# Patient Record
Sex: MALE | Race: WHITE | Employment: FULL TIME | URBAN - NONMETROPOLITAN AREA
[De-identification: names, ages, dates, MRNs, and addresses within clinical notes are randomized per-mention and may not be internally consistent; named-entity substitution may affect disease eponyms.]

---

## 2021-04-17 ENCOUNTER — HOSPITAL ENCOUNTER (INPATIENT)
Age: 51
LOS: 2 days | Discharge: HOME | DRG: 309 | End: 2021-04-19
Attending: EMERGENCY MEDICINE | Admitting: INTERNAL MEDICINE
Payer: COMMERCIAL

## 2021-04-17 ENCOUNTER — APPOINTMENT (OUTPATIENT)
Dept: GENERAL RADIOLOGY | Age: 51
DRG: 309 | End: 2021-04-17
Payer: COMMERCIAL

## 2021-04-17 DIAGNOSIS — I48.92 ATRIAL FLUTTER WITH RAPID VENTRICULAR RESPONSE (HCC): Primary | ICD-10-CM

## 2021-04-17 PROBLEM — R00.2 PALPITATIONS: Status: ACTIVE | Noted: 2021-04-17

## 2021-04-17 PROBLEM — G47.33 OSA (OBSTRUCTIVE SLEEP APNEA): Status: ACTIVE | Noted: 2021-04-17

## 2021-04-17 PROBLEM — I47.1 PSVT (PAROXYSMAL SUPRAVENTRICULAR TACHYCARDIA) (HCC): Status: ACTIVE | Noted: 2021-04-17

## 2021-04-17 PROBLEM — I10 HTN (HYPERTENSION), BENIGN: Status: ACTIVE | Noted: 2021-04-17

## 2021-04-17 PROBLEM — E11.9 TYPE 2 DIABETES MELLITUS WITHOUT COMPLICATION, WITHOUT LONG-TERM CURRENT USE OF INSULIN (HCC): Status: ACTIVE | Noted: 2021-04-17

## 2021-04-17 PROBLEM — I47.10 PSVT (PAROXYSMAL SUPRAVENTRICULAR TACHYCARDIA): Status: ACTIVE | Noted: 2021-04-17

## 2021-04-17 PROBLEM — Z87.891 HISTORY OF TOBACCO USE DISORDER: Status: ACTIVE | Noted: 2021-04-17

## 2021-04-17 LAB
ALBUMIN SERPL-MCNC: 3.4 GM/DL (ref 3.4–5)
ALP BLD-CCNC: 90 U/L (ref 46–116)
ALT SERPL-CCNC: 21 U/L (ref 14–63)
ANION GAP: 5 MEQ/L (ref 8–16)
AST SERPL-CCNC: 15 U/L (ref 15–37)
AVERAGE GLUCOSE: 111 MG/DL (ref 70–126)
BILIRUB SERPL-MCNC: 0.6 MG/DL (ref 0.2–1)
BUN BLDV-MCNC: 24 MG/DL (ref 7–18)
CHLORIDE BLD-SCNC: 103 MEQ/L (ref 98–107)
CHP ED QC CHECK: YES
CO2: 31 MEQ/L (ref 21–32)
CREAT SERPL-MCNC: 1.3 MG/DL (ref 0.6–1.3)
DIFFERENTIAL, MANUAL: NORMAL
EOSINOPHILS RELATIVE PERCENT: 2 % (ref 0–4)
GFR, ESTIMATED: 62 ML/MIN/1.73M2
GLUCOSE BLD-MCNC: 103 MG/DL (ref 74–106)
GLUCOSE BLD-MCNC: 90 MG/DL
GLUCOSE BLD-MCNC: 90 MG/DL (ref 70–108)
HBA1C MFR BLD: 5.7 % (ref 4.4–6.4)
HCT VFR BLD CALC: 52.1 % (ref 42–52)
HEMOGLOBIN: 17 GM/DL (ref 14–18)
INR BLD: 2.12 (ref 0.85–1.13)
LYMPHOCYTES # BLD: 22 % (ref 15–47)
MAGNESIUM: 2 MG/DL (ref 1.6–2.4)
MAGNESIUM: 2 MG/DL (ref 1.8–2.4)
MCH RBC QN AUTO: 31.6 PG (ref 27–31)
MCHC RBC AUTO-ENTMCNC: 32.7 GM/DL (ref 33–37)
MCV RBC AUTO: 96.6 FL (ref 80–94)
MONOCYTES: 7 % (ref 0–12)
NT PRO BNP: 1552 PG/ML (ref 0–450)
PDW BLD-RTO: 15.3 % (ref 11.5–14.5)
PLATELET # BLD: 222 THOU/MM3 (ref 130–400)
PLATELET ESTIMATE: ADEQUATE
PMV BLD AUTO: 7.3 FL (ref 7.4–10.4)
POC CALCIUM: 9 MG/DL (ref 8.5–10.1)
POTASSIUM SERPL-SCNC: 4.9 MEQ/L (ref 3.5–5.1)
RBC # BLD: 5.39 MILL/MM3 (ref 4.7–6.1)
SEGS: 69 % (ref 43–75)
SODIUM BLD-SCNC: 139 MEQ/L (ref 136–145)
TOTAL PROTEIN: 7.8 GM/DL (ref 6.4–8.2)
TROPONIN I: < 0.017 NG/ML (ref 0.02–0.06)
TROPONIN T: < 0.01 NG/ML
TSH SERPL DL<=0.05 MIU/L-ACNC: 1.79 UIU/ML (ref 0.4–4.2)
WBC # BLD: 9.7 THOU/MM3 (ref 4.8–10.8)

## 2021-04-17 PROCEDURE — 84484 ASSAY OF TROPONIN QUANT: CPT

## 2021-04-17 PROCEDURE — 87641 MR-STAPH DNA AMP PROBE: CPT

## 2021-04-17 PROCEDURE — 99223 1ST HOSP IP/OBS HIGH 75: CPT | Performed by: INTERNAL MEDICINE

## 2021-04-17 PROCEDURE — 6360000002 HC RX W HCPCS

## 2021-04-17 PROCEDURE — 71045 X-RAY EXAM CHEST 1 VIEW: CPT

## 2021-04-17 PROCEDURE — 2580000003 HC RX 258: Performed by: EMERGENCY MEDICINE

## 2021-04-17 PROCEDURE — 93005 ELECTROCARDIOGRAM TRACING: CPT | Performed by: EMERGENCY MEDICINE

## 2021-04-17 PROCEDURE — 84443 ASSAY THYROID STIM HORMONE: CPT

## 2021-04-17 PROCEDURE — 2580000003 HC RX 258: Performed by: INTERNAL MEDICINE

## 2021-04-17 PROCEDURE — 83880 ASSAY OF NATRIURETIC PEPTIDE: CPT

## 2021-04-17 PROCEDURE — 99282 EMERGENCY DEPT VISIT SF MDM: CPT

## 2021-04-17 PROCEDURE — 87081 CULTURE SCREEN ONLY: CPT

## 2021-04-17 PROCEDURE — 85610 PROTHROMBIN TIME: CPT

## 2021-04-17 PROCEDURE — 6360000002 HC RX W HCPCS: Performed by: EMERGENCY MEDICINE

## 2021-04-17 PROCEDURE — 96365 THER/PROPH/DIAG IV INF INIT: CPT

## 2021-04-17 PROCEDURE — 83735 ASSAY OF MAGNESIUM: CPT

## 2021-04-17 PROCEDURE — 93005 ELECTROCARDIOGRAM TRACING: CPT | Performed by: NURSE PRACTITIONER

## 2021-04-17 PROCEDURE — 36415 COLL VENOUS BLD VENIPUNCTURE: CPT

## 2021-04-17 PROCEDURE — 6360000002 HC RX W HCPCS: Performed by: INTERNAL MEDICINE

## 2021-04-17 PROCEDURE — 93005 ELECTROCARDIOGRAM TRACING: CPT | Performed by: INTERNAL MEDICINE

## 2021-04-17 PROCEDURE — 87500 VANOMYCIN DNA AMP PROBE: CPT

## 2021-04-17 PROCEDURE — 82948 REAGENT STRIP/BLOOD GLUCOSE: CPT

## 2021-04-17 PROCEDURE — 80053 COMPREHEN METABOLIC PANEL: CPT

## 2021-04-17 PROCEDURE — 2500000003 HC RX 250 WO HCPCS: Performed by: INTERNAL MEDICINE

## 2021-04-17 PROCEDURE — 2500000003 HC RX 250 WO HCPCS: Performed by: EMERGENCY MEDICINE

## 2021-04-17 PROCEDURE — 2060000000 HC ICU INTERMEDIATE R&B

## 2021-04-17 PROCEDURE — 85025 COMPLETE CBC W/AUTO DIFF WBC: CPT

## 2021-04-17 PROCEDURE — 83036 HEMOGLOBIN GLYCOSYLATED A1C: CPT

## 2021-04-17 RX ORDER — PROMETHAZINE HYDROCHLORIDE 25 MG/1
12.5 TABLET ORAL EVERY 6 HOURS PRN
Status: DISCONTINUED | OUTPATIENT
Start: 2021-04-17 | End: 2021-04-19 | Stop reason: HOSPADM

## 2021-04-17 RX ORDER — LOSARTAN POTASSIUM 25 MG/1
25 TABLET ORAL DAILY
COMMUNITY
End: 2022-08-30 | Stop reason: ALTCHOICE

## 2021-04-17 RX ORDER — 0.9 % SODIUM CHLORIDE 0.9 %
1000 INTRAVENOUS SOLUTION INTRAVENOUS ONCE
Status: COMPLETED | OUTPATIENT
Start: 2021-04-17 | End: 2021-04-17

## 2021-04-17 RX ORDER — ACETAMINOPHEN 650 MG/1
650 SUPPOSITORY RECTAL EVERY 6 HOURS PRN
Status: DISCONTINUED | OUTPATIENT
Start: 2021-04-17 | End: 2021-04-19 | Stop reason: HOSPADM

## 2021-04-17 RX ORDER — ACETAMINOPHEN 325 MG/1
650 TABLET ORAL EVERY 6 HOURS PRN
Status: DISCONTINUED | OUTPATIENT
Start: 2021-04-17 | End: 2021-04-19 | Stop reason: HOSPADM

## 2021-04-17 RX ORDER — DIGOXIN 0.25 MG/ML
500 INJECTION INTRAMUSCULAR; INTRAVENOUS ONCE
Status: COMPLETED | OUTPATIENT
Start: 2021-04-17 | End: 2021-04-17

## 2021-04-17 RX ORDER — ADENOSINE 3 MG/ML
INJECTION, SOLUTION INTRAVENOUS
Status: COMPLETED
Start: 2021-04-17 | End: 2021-04-17

## 2021-04-17 RX ORDER — ADENOSINE 3 MG/ML
6 INJECTION, SOLUTION INTRAVENOUS ONCE
Status: DISCONTINUED | OUTPATIENT
Start: 2021-04-17 | End: 2021-04-17

## 2021-04-17 RX ORDER — ADENOSINE 3 MG/ML
12 INJECTION, SOLUTION INTRAVENOUS ONCE
Status: DISCONTINUED | OUTPATIENT
Start: 2021-04-17 | End: 2021-04-17

## 2021-04-17 RX ORDER — SODIUM CHLORIDE 9 MG/ML
25 INJECTION, SOLUTION INTRAVENOUS PRN
Status: DISCONTINUED | OUTPATIENT
Start: 2021-04-17 | End: 2021-04-19 | Stop reason: HOSPADM

## 2021-04-17 RX ORDER — WARFARIN SODIUM 5 MG/1
5 TABLET ORAL
COMMUNITY
End: 2022-08-15 | Stop reason: SDUPTHER

## 2021-04-17 RX ORDER — SODIUM CHLORIDE 0.9 % (FLUSH) 0.9 %
10 SYRINGE (ML) INJECTION EVERY 12 HOURS SCHEDULED
Status: DISCONTINUED | OUTPATIENT
Start: 2021-04-17 | End: 2021-04-19 | Stop reason: HOSPADM

## 2021-04-17 RX ORDER — LOSARTAN POTASSIUM 25 MG/1
25 TABLET ORAL DAILY
Status: DISCONTINUED | OUTPATIENT
Start: 2021-04-18 | End: 2021-04-17

## 2021-04-17 RX ORDER — DILTIAZEM HYDROCHLORIDE 5 MG/ML
20 INJECTION INTRAVENOUS ONCE
Status: COMPLETED | OUTPATIENT
Start: 2021-04-17 | End: 2021-04-17

## 2021-04-17 RX ORDER — SODIUM CHLORIDE 0.9 % (FLUSH) 0.9 %
10 SYRINGE (ML) INJECTION PRN
Status: DISCONTINUED | OUTPATIENT
Start: 2021-04-17 | End: 2021-04-19 | Stop reason: HOSPADM

## 2021-04-17 RX ORDER — METOPROLOL TARTRATE 5 MG/5ML
5 INJECTION INTRAVENOUS
Status: DISCONTINUED | OUTPATIENT
Start: 2021-04-17 | End: 2021-04-19 | Stop reason: HOSPADM

## 2021-04-17 RX ORDER — LOSARTAN POTASSIUM 25 MG/1
25 TABLET ORAL DAILY
Status: DISCONTINUED | OUTPATIENT
Start: 2021-04-18 | End: 2021-04-19 | Stop reason: HOSPADM

## 2021-04-17 RX ORDER — ADENOSINE 3 MG/ML
12 INJECTION, SOLUTION INTRAVENOUS ONCE
Status: COMPLETED | OUTPATIENT
Start: 2021-04-17 | End: 2021-04-17

## 2021-04-17 RX ORDER — 0.9 % SODIUM CHLORIDE 0.9 %
500 INTRAVENOUS SOLUTION INTRAVENOUS ONCE
Status: COMPLETED | OUTPATIENT
Start: 2021-04-17 | End: 2021-04-18

## 2021-04-17 RX ORDER — POLYETHYLENE GLYCOL 3350 17 G/17G
17 POWDER, FOR SOLUTION ORAL DAILY PRN
Status: DISCONTINUED | OUTPATIENT
Start: 2021-04-17 | End: 2021-04-19 | Stop reason: HOSPADM

## 2021-04-17 RX ORDER — WARFARIN SODIUM 3 MG/1
2.5 TABLET ORAL DAILY
Status: ON HOLD | COMMUNITY
End: 2021-04-17 | Stop reason: CLARIF

## 2021-04-17 RX ORDER — ADENOSINE 3 MG/ML
6 INJECTION, SOLUTION INTRAVENOUS ONCE
Status: COMPLETED | OUTPATIENT
Start: 2021-04-17 | End: 2021-04-17

## 2021-04-17 RX ORDER — ONDANSETRON 2 MG/ML
4 INJECTION INTRAMUSCULAR; INTRAVENOUS EVERY 6 HOURS PRN
Status: DISCONTINUED | OUTPATIENT
Start: 2021-04-17 | End: 2021-04-19 | Stop reason: HOSPADM

## 2021-04-17 RX ADMIN — ADENOSINE 12 MG: 3 INJECTION INTRAVENOUS at 23:00

## 2021-04-17 RX ADMIN — METOPROLOL TARTRATE 5 MG: 5 INJECTION INTRAVENOUS at 23:13

## 2021-04-17 RX ADMIN — ADENOSINE 12 MG: 3 INJECTION, SOLUTION INTRAVENOUS at 23:00

## 2021-04-17 RX ADMIN — DILTIAZEM HYDROCHLORIDE 20 MG: 5 INJECTION INTRAVENOUS at 18:16

## 2021-04-17 RX ADMIN — SODIUM CHLORIDE, PRESERVATIVE FREE 10 ML: 5 INJECTION INTRAVENOUS at 23:06

## 2021-04-17 RX ADMIN — DIGOXIN 500 MCG: 0.25 INJECTION INTRAMUSCULAR; INTRAVENOUS at 18:53

## 2021-04-17 RX ADMIN — SODIUM CHLORIDE 1000 ML: 9 INJECTION, SOLUTION INTRAVENOUS at 18:29

## 2021-04-17 RX ADMIN — ADENOSINE 6 MG: 3 INJECTION INTRAVENOUS at 22:50

## 2021-04-17 RX ADMIN — SODIUM CHLORIDE 500 ML: 9 INJECTION, SOLUTION INTRAVENOUS at 23:06

## 2021-04-17 RX ADMIN — DILTIAZEM HYDROCHLORIDE 5 MG/HR: 5 INJECTION INTRAVENOUS at 18:18

## 2021-04-17 SDOH — HEALTH STABILITY: MENTAL HEALTH: HOW OFTEN DO YOU HAVE A DRINK CONTAINING ALCOHOL?: NEVER

## 2021-04-17 ASSESSMENT — ENCOUNTER SYMPTOMS
ABDOMINAL DISTENTION: 0
SHORTNESS OF BREATH: 1
DIARRHEA: 0
COUGH: 0
NAUSEA: 0
ANAL BLEEDING: 0
BACK PAIN: 0
SORE THROAT: 0
VOMITING: 0
CHOKING: 0
RECTAL PAIN: 0
VOICE CHANGE: 0
CONSTIPATION: 0
EYE DISCHARGE: 0
FACIAL SWELLING: 0
SHORTNESS OF BREATH: 0
APNEA: 0
CHEST TIGHTNESS: 0
PHOTOPHOBIA: 0
SINUS PRESSURE: 0
EYE PAIN: 0
STRIDOR: 0
TROUBLE SWALLOWING: 0
ABDOMINAL PAIN: 0
EYE ITCHING: 0
EYE REDNESS: 0
RHINORRHEA: 0
BLOOD IN STOOL: 0
WHEEZING: 0
SINUS PAIN: 0
COLOR CHANGE: 0

## 2021-04-17 ASSESSMENT — PAIN SCALES - GENERAL
PAINLEVEL_OUTOF10: 0
PAINLEVEL_OUTOF10: 0

## 2021-04-17 NOTE — ED NOTES
Heart rate remains 163, Cardazem increased to 15 ml/mg per Dr. Cassandra Carbajal verbal order.       Carole Bernal RN  04/17/21 6061

## 2021-04-17 NOTE — ED PROVIDER NOTES
[facility]  eMERGENCY dEPARTMENT eNCOUnter             Keith Severino 19 COMPLAINT    Chief Complaint   Patient presents with    Palpitations     diaphoretic, palpitations, had energy drinks today. Nurses Notes reviewed and I agree except as noted in the HPI. HPI    Azra Bates is a 53 y.o. male who presents with a 24-hour history of palpitations with diaphoresis, fatigue, intermittent exertional dyspnea and chest tightness. Onset was after drinking \"8 Monster energy drinks \". He states that he has been drinking the energy drinks for a few days to give him energy to do things around the house. He has no previous history of similar symptoms. He does have a past history of pulmonary emboli, and is chronically on Coumadin. He has been taking his medication as prescribed. He is not on any beta-blocker or calcium channel blocker. He has non-insulin-requiring diabetes mellitus. He denies any known history of heart disease. REVIEW OF SYSTEMS      Review of Systems   Constitutional: Positive for diaphoresis (Tonight) and malaise/fatigue. Negative for fever. HENT: Negative for congestion and sore throat. Respiratory: Positive for shortness of breath (With exertion). Negative for cough and wheezing. Cardiovascular: Positive for palpitations and leg swelling (Chronic). Negative for chest pain. Gastrointestinal: Negative for abdominal pain, nausea and vomiting. Musculoskeletal:        No calf tenderness   Neurological: Positive for weakness. Negative for dizziness, focal weakness, loss of consciousness and headaches. Psychiatric/Behavioral: The patient is nervous/anxious. All other systems reviewed and are negative. PAST MEDICAL HISTORY     has a past medical history of Blood clot in vein, Pneumonia, and Pulmonary emboli ([address] SURGICAL HISTORY     has no past surgical history on file.     CURRENT MEDICATIONS    Previous Medications    LOSARTAN (COZAAR) 25 MG TABLET    Take 25 mg by mouth daily    METFORMIN (GLUCOPHAGE) 500 MG TABLET    Take 500 mg by mouth 2 times daily (with meals)    WARFARIN (COUMADIN) 3 MG TABLET    Take 2.5 mg by mouth daily 2.5 mg every other day & 3 mg the other days. ALLERGIES    has No Known Allergies. FAMILY HISTORY    has no family status information on file. family history is not on file. SOCIAL HISTORY     reports that he quit smoking about 2 months ago. He has never used smokeless tobacco. He reports that he does not drink alcohol. PHYSICAL EXAM       INITIAL VITALS: /81   Pulse 162   Temp 97.6 °F (36.4 °C) (Temporal)   Resp 20   Ht 6' 1\" (1.854 m)   Wt (!) 326 lb (147.9 kg)   SpO2 97%   BMI 43.01 kg/m²      Physical Exam  Vitals signs and nursing note reviewed. Exam conducted with a chaperone present. Constitutional:       Appearance: He is diaphoretic. He is not toxic-appearing. HENT:      Nose: Nose normal. No congestion or rhinorrhea. Mouth/Throat:      Mouth: Mucous membranes are moist.      Pharynx: No posterior oropharyngeal erythema. Eyes:      Conjunctiva/sclera: Conjunctivae normal.      Pupils: Pupils are equal, round, and reactive to light. Neck:      Musculoskeletal: Neck supple. Cardiovascular:      Rate and Rhythm: Regular rhythm. Tachycardia present. Pulses: Normal pulses. Heart sounds: No murmur. Pulmonary:      Effort: Pulmonary effort is normal. No respiratory distress. Breath sounds: Normal breath sounds. No wheezing. Abdominal:      General: Bowel sounds are normal.      Palpations: Abdomen is soft. There is no mass. Tenderness: There is no abdominal tenderness. Musculoskeletal:         General: Swelling (1+ lower legs, no calf tenderness) present. Skin:     Comments: Moist, warm   Neurological:      General: No focal deficit present. Mental Status: He is alert and oriented to person, place, and time. Psychiatric:         Behavior: Behavior normal.         DIAGNOSTIC RESULTS    EKG   Atrial flutter, 2-1 block, nonspecific T wave changes, no acute pattern of infarct. RADIOLOGY:    XR CHEST PORTABLE   Final Result   There is no acute intrathoracic process. **This report has been created using voice recognition software. It may contain minor errors which are inherent in voice recognition technology. **      Final report electronically signed by Dr James Navarro on 4/17/2021 6:36 PM         LABS:     Labs Reviewed   CBC WITH AUTO DIFFERENTIAL - Abnormal; Notable for the following components:       Result Value    Hematocrit 52.1 (*)     MCV 96.6 (*)     MCH 31.6 (*)     MCHC 32.7 (*)     RDW 15.3 (*)     MPV 7.3 (*)     All other components within normal limits   COMPREHENSIVE METABOLIC PANEL - Abnormal; Notable for the following components:    BUN 24 (*)     All other components within normal limits   BRAIN NATRIURETIC PEPTIDE - Abnormal; Notable for the following components:    NT Pro-BNP 1552.0 (*)     All other components within normal limits   GLOMERULAR FILTRATION RATE, ESTIMATED - Abnormal; Notable for the following components:    GFR, Estimated 62 (*)     All other components within normal limits   ANION GAP - Abnormal; Notable for the following components:    Anion Gap 5.0 (*)     All other components within normal limits   PROTIME - Abnormal; Notable for the following components:    INR 2.12 (*)     All other components within normal limits   POCT GLUCOSE - Normal   MAGNESIUM   TROPONIN   MANUAL DIFFERENTIAL   POCT GLUCOSE       Vitals:    Vitals:    04/17/21 2007 04/17/21 2022 04/17/21 2029 04/17/21 2037   BP: 122/77 103/80 105/75 115/81   Pulse: 163 165 162    Resp: (!) 31 27 20    Temp:       TempSrc:       SpO2: 97% 95% 97%    Weight:       Height:           EMERGENCY DEPARTMENT COURSE:    He received Cardizem bolus 20 mg, drip titrated up to 15 mg an hour, heart rate still in the 160s.  Digoxin 0.5 mg given IV. Heart rate is not improving. Current blood pressure is 119/72, heart rate 163. Initially, my plan was to give Adenocard 6 and 12 mg in an attempt to slow him down. However, after conversation with Dr. Lelo Lizarraga, cardiologist from [facility], he advised against that, stating that he did not think that would control his rate either. He recommended that as long as the patient is hemodynamically stable, we do nothing further to try to lower his rate. Initially, the patient wanted to go to [facility], as he had been admitted there when he had pulmonary emboli in the past, but then I found out they have no beds available until tomorrow. I talked with the patient again, and he now wants to go to [facility]. A page was sent out to Dr. Leela Voss, who advised that the patient be admitted to the hospitalist. Dr. Marcial Vázquez accepting. CRITICAL CARE:     30 min    CONSULTS:    Amina Fitch, cardiology    FINAL IMPRESSION      1. Atrial flutter with rapid ventricular response [facility])        DISPOSITION/PLAN    DISPOSITION Admitted 04/17/2021 08:28:47 PM to [facility], Dr. Marcial Vázquez, ambulance transport.       (Please note that portions of this note were completed with a voice recognition program.  Efforts were made to edit the dictations but occasionally words are mis-transcribed.)     Colt Carlisle MD  04/17/21 2045

## 2021-04-17 NOTE — ED TRIAGE NOTES
Pt had about 8 energy drinks yesterday, none today. Today having diaphoresis, palpitations. Denies chest pain. No edema.

## 2021-04-17 NOTE — ED NOTES
Dr. Sr Postal on the phone with [facility] to discuss admission. Per [facility] staff they do not have any beds at the moment.      Gregor Trujillo RN  04/17/21 1937

## 2021-04-18 PROBLEM — D75.89 MACROCYTOSIS WITHOUT ANEMIA: Status: ACTIVE | Noted: 2021-04-18

## 2021-04-18 PROBLEM — Z86.711 PERSONAL HISTORY OF PULMONARY EMBOLISM: Status: ACTIVE | Noted: 2021-04-18

## 2021-04-18 PROBLEM — Z79.01 ANTICOAGULATED ON COUMADIN: Status: ACTIVE | Noted: 2021-04-18

## 2021-04-18 LAB
AMPHETAMINE+METHAMPHETAMINE URINE SCREEN: NEGATIVE
ANION GAP SERPL CALCULATED.3IONS-SCNC: 8 MEQ/L (ref 8–16)
BARBITURATE QUANTITATIVE URINE: NEGATIVE
BENZODIAZEPINE QUANTITATIVE URINE: NEGATIVE
BUN BLDV-MCNC: 19 MG/DL (ref 7–22)
CALCIUM SERPL-MCNC: 9.3 MG/DL (ref 8.5–10.5)
CANNABINOID QUANTITATIVE URINE: NEGATIVE
CHLORIDE BLD-SCNC: 103 MEQ/L (ref 98–111)
CO2: 27 MEQ/L (ref 23–33)
COCAINE METABOLITE QUANTITATIVE URINE: NEGATIVE
CREAT SERPL-MCNC: 0.7 MG/DL (ref 0.4–1.2)
EKG ATRIAL RATE: 166 BPM
EKG ATRIAL RATE: 326 BPM
EKG P AXIS: -112 DEGREES
EKG P AXIS: 111 DEGREES
EKG P-R INTERVAL: 126 MS
EKG Q-T INTERVAL: 270 MS
EKG Q-T INTERVAL: 312 MS
EKG Q-T INTERVAL: 330 MS
EKG Q-T INTERVAL: 354 MS
EKG QRS DURATION: 128 MS
EKG QRS DURATION: 82 MS
EKG QRS DURATION: 88 MS
EKG QRS DURATION: 88 MS
EKG QTC CALCULATION (BAZETT): 427 MS
EKG QTC CALCULATION (BAZETT): 448 MS
EKG QTC CALCULATION (BAZETT): 459 MS
EKG QTC CALCULATION (BAZETT): 513 MS
EKG R AXIS: -25 DEGREES
EKG R AXIS: -28 DEGREES
EKG R AXIS: 18 DEGREES
EKG R AXIS: 9 DEGREES
EKG T AXIS: -35 DEGREES
EKG T AXIS: 142 DEGREES
EKG T AXIS: 38 DEGREES
EKG T AXIS: 39 DEGREES
EKG VENTRICULAR RATE: 101 BPM
EKG VENTRICULAR RATE: 101 BPM
EKG VENTRICULAR RATE: 163 BPM
EKG VENTRICULAR RATE: 166 BPM
ERYTHROCYTE [DISTWIDTH] IN BLOOD BY AUTOMATED COUNT: 15.1 % (ref 11.5–14.5)
ERYTHROCYTE [DISTWIDTH] IN BLOOD BY AUTOMATED COUNT: 52.9 FL (ref 35–45)
ETHYL ALCOHOL, SERUM: < 0.01 %
FOLATE: 6.3 NG/ML (ref 4.8–24.2)
GFR SERPL CREATININE-BSD FRML MDRD: > 90 ML/MIN/1.73M2
GLUCOSE BLD-MCNC: 104 MG/DL (ref 70–108)
GLUCOSE BLD-MCNC: 143 MG/DL (ref 70–108)
GLUCOSE BLD-MCNC: 97 MG/DL (ref 70–108)
HCT VFR BLD CALC: 49.6 % (ref 42–52)
HEMOGLOBIN: 16 GM/DL (ref 14–18)
INR BLD: 2.1 (ref 0.85–1.13)
MAGNESIUM: 2.2 MG/DL (ref 1.6–2.4)
MCH RBC QN AUTO: 30.6 PG (ref 26–33)
MCHC RBC AUTO-ENTMCNC: 32.3 GM/DL (ref 32.2–35.5)
MCV RBC AUTO: 94.8 FL (ref 80–94)
MRSA SCREEN RT-PCR: NEGATIVE
OPIATES, URINE: NEGATIVE
OXYCODONE: NEGATIVE
PHENCYCLIDINE QUANTITATIVE URINE: NEGATIVE
PLATELET # BLD: 177 THOU/MM3 (ref 130–400)
PMV BLD AUTO: 9.1 FL (ref 9.4–12.4)
POTASSIUM SERPL-SCNC: 4.7 MEQ/L (ref 3.5–5.2)
RBC # BLD: 5.23 MILL/MM3 (ref 4.7–6.1)
SODIUM BLD-SCNC: 138 MEQ/L (ref 135–145)
TROPONIN T: < 0.01 NG/ML
TROPONIN T: < 0.01 NG/ML
VANCOMYCIN RESISTANT ENTEROCOCCUS: NEGATIVE
VITAMIN B-12: 630 PG/ML (ref 211–911)
WBC # BLD: 8.5 THOU/MM3 (ref 4.8–10.8)

## 2021-04-18 PROCEDURE — 85027 COMPLETE CBC AUTOMATED: CPT

## 2021-04-18 PROCEDURE — 82746 ASSAY OF FOLIC ACID SERUM: CPT

## 2021-04-18 PROCEDURE — 6360000002 HC RX W HCPCS

## 2021-04-18 PROCEDURE — 82077 ASSAY SPEC XCP UR&BREATH IA: CPT

## 2021-04-18 PROCEDURE — 2060000000 HC ICU INTERMEDIATE R&B

## 2021-04-18 PROCEDURE — 93005 ELECTROCARDIOGRAM TRACING: CPT | Performed by: STUDENT IN AN ORGANIZED HEALTH CARE EDUCATION/TRAINING PROGRAM

## 2021-04-18 PROCEDURE — 6370000000 HC RX 637 (ALT 250 FOR IP): Performed by: STUDENT IN AN ORGANIZED HEALTH CARE EDUCATION/TRAINING PROGRAM

## 2021-04-18 PROCEDURE — 82948 REAGENT STRIP/BLOOD GLUCOSE: CPT

## 2021-04-18 PROCEDURE — 85610 PROTHROMBIN TIME: CPT

## 2021-04-18 PROCEDURE — 82607 VITAMIN B-12: CPT

## 2021-04-18 PROCEDURE — 6370000000 HC RX 637 (ALT 250 FOR IP): Performed by: INTERNAL MEDICINE

## 2021-04-18 PROCEDURE — 93010 ELECTROCARDIOGRAM REPORT: CPT | Performed by: NUCLEAR MEDICINE

## 2021-04-18 PROCEDURE — 84484 ASSAY OF TROPONIN QUANT: CPT

## 2021-04-18 PROCEDURE — 80307 DRUG TEST PRSMV CHEM ANLYZR: CPT

## 2021-04-18 PROCEDURE — 5A2204Z RESTORATION OF CARDIAC RHYTHM, SINGLE: ICD-10-PCS | Performed by: INTERNAL MEDICINE

## 2021-04-18 PROCEDURE — 99233 SBSQ HOSP IP/OBS HIGH 50: CPT | Performed by: INTERNAL MEDICINE

## 2021-04-18 PROCEDURE — 2500000003 HC RX 250 WO HCPCS

## 2021-04-18 PROCEDURE — 2580000003 HC RX 258: Performed by: INTERNAL MEDICINE

## 2021-04-18 PROCEDURE — 6360000002 HC RX W HCPCS: Performed by: STUDENT IN AN ORGANIZED HEALTH CARE EDUCATION/TRAINING PROGRAM

## 2021-04-18 PROCEDURE — 92960 CARDIOVERSION ELECTRIC EXT: CPT | Performed by: INTERNAL MEDICINE

## 2021-04-18 PROCEDURE — 80048 BASIC METABOLIC PNL TOTAL CA: CPT

## 2021-04-18 PROCEDURE — 83735 ASSAY OF MAGNESIUM: CPT

## 2021-04-18 PROCEDURE — 36415 COLL VENOUS BLD VENIPUNCTURE: CPT

## 2021-04-18 PROCEDURE — 2500000003 HC RX 250 WO HCPCS: Performed by: STUDENT IN AN ORGANIZED HEALTH CARE EDUCATION/TRAINING PROGRAM

## 2021-04-18 PROCEDURE — 99255 IP/OBS CONSLTJ NEW/EST HI 80: CPT | Performed by: INTERNAL MEDICINE

## 2021-04-18 RX ORDER — FENTANYL CITRATE 50 UG/ML
100 INJECTION, SOLUTION INTRAMUSCULAR; INTRAVENOUS ONCE
Status: COMPLETED | OUTPATIENT
Start: 2021-04-18 | End: 2021-04-18

## 2021-04-18 RX ORDER — MIDAZOLAM HYDROCHLORIDE 2 MG/2ML
INJECTION, SOLUTION INTRAMUSCULAR; INTRAVENOUS
Status: COMPLETED
Start: 2021-04-18 | End: 2021-04-18

## 2021-04-18 RX ORDER — NALOXONE HYDROCHLORIDE 0.4 MG/ML
INJECTION, SOLUTION INTRAMUSCULAR; INTRAVENOUS; SUBCUTANEOUS
Status: COMPLETED
Start: 2021-04-18 | End: 2021-04-18

## 2021-04-18 RX ORDER — FENTANYL CITRATE 50 UG/ML
50 INJECTION, SOLUTION INTRAMUSCULAR; INTRAVENOUS
Status: DISCONTINUED | OUTPATIENT
Start: 2021-04-18 | End: 2021-04-18

## 2021-04-18 RX ORDER — FENTANYL CITRATE 50 UG/ML
50 INJECTION, SOLUTION INTRAMUSCULAR; INTRAVENOUS ONCE
Status: COMPLETED | OUTPATIENT
Start: 2021-04-18 | End: 2021-04-18

## 2021-04-18 RX ORDER — WARFARIN SODIUM 3 MG/1
3 TABLET ORAL
Status: COMPLETED | OUTPATIENT
Start: 2021-04-18 | End: 2021-04-18

## 2021-04-18 RX ORDER — DILTIAZEM HYDROCHLORIDE 60 MG/1
60 TABLET, FILM COATED ORAL EVERY 6 HOURS SCHEDULED
Status: DISCONTINUED | OUTPATIENT
Start: 2021-04-18 | End: 2021-04-18

## 2021-04-18 RX ORDER — FENTANYL CITRATE 50 UG/ML
INJECTION, SOLUTION INTRAMUSCULAR; INTRAVENOUS
Status: COMPLETED
Start: 2021-04-18 | End: 2021-04-18

## 2021-04-18 RX ORDER — FLUMAZENIL 0.1 MG/ML
INJECTION, SOLUTION INTRAVENOUS
Status: COMPLETED
Start: 2021-04-18 | End: 2021-04-18

## 2021-04-18 RX ORDER — MIDAZOLAM HYDROCHLORIDE 1 MG/ML
2 INJECTION INTRAMUSCULAR; INTRAVENOUS ONCE
Status: DISCONTINUED | OUTPATIENT
Start: 2021-04-18 | End: 2021-04-19 | Stop reason: HOSPADM

## 2021-04-18 RX ADMIN — FLUMAZENIL 0.5 MG: 0.1 INJECTION INTRAVENOUS at 20:10

## 2021-04-18 RX ADMIN — SODIUM CHLORIDE, PRESERVATIVE FREE 10 ML: 5 INJECTION INTRAVENOUS at 11:27

## 2021-04-18 RX ADMIN — FENTANYL CITRATE 50 MCG: 50 INJECTION, SOLUTION INTRAMUSCULAR; INTRAVENOUS at 20:21

## 2021-04-18 RX ADMIN — AMIODARONE HYDROCHLORIDE 150 MG: 1.5 INJECTION, SOLUTION INTRAVENOUS at 18:52

## 2021-04-18 RX ADMIN — FENTANYL CITRATE 100 MCG: 50 INJECTION, SOLUTION INTRAMUSCULAR; INTRAVENOUS at 20:13

## 2021-04-18 RX ADMIN — LOSARTAN POTASSIUM 25 MG: 25 TABLET, FILM COATED ORAL at 08:02

## 2021-04-18 RX ADMIN — METOPROLOL TARTRATE 25 MG: 25 TABLET, FILM COATED ORAL at 08:02

## 2021-04-18 RX ADMIN — DILTIAZEM HYDROCHLORIDE 60 MG: 60 TABLET, FILM COATED ORAL at 16:59

## 2021-04-18 RX ADMIN — SODIUM CHLORIDE, PRESERVATIVE FREE 10 ML: 5 INJECTION INTRAVENOUS at 20:51

## 2021-04-18 RX ADMIN — METOPROLOL TARTRATE 25 MG: 25 TABLET, FILM COATED ORAL at 02:12

## 2021-04-18 RX ADMIN — DILTIAZEM HYDROCHLORIDE 30 MG: 30 TABLET, FILM COATED ORAL at 08:02

## 2021-04-18 RX ADMIN — DILTIAZEM HYDROCHLORIDE 30 MG: 30 TABLET, FILM COATED ORAL at 11:27

## 2021-04-18 RX ADMIN — AMIODARONE HYDROCHLORIDE 1 MG/MIN: 1.8 INJECTION, SOLUTION INTRAVENOUS at 19:05

## 2021-04-18 RX ADMIN — NALOXONE HYDROCHLORIDE 0.4 MG: 0.4 INJECTION, SOLUTION INTRAMUSCULAR; INTRAVENOUS; SUBCUTANEOUS at 20:10

## 2021-04-18 RX ADMIN — MIDAZOLAM 3 MG: 1 INJECTION INTRAMUSCULAR; INTRAVENOUS at 20:06

## 2021-04-18 RX ADMIN — WARFARIN SODIUM 3 MG: 3 TABLET ORAL at 16:59

## 2021-04-18 ASSESSMENT — PAIN SCALES - GENERAL
PAINLEVEL_OUTOF10: 0

## 2021-04-18 NOTE — PRE SEDATION
[address] [address]  Sedation/Analgesia History & Physical    Pt Name: Valentine Main  Account number: [de-identified]  MRN: [id number]  YOB: 1970  Provider Performing Procedure: Franklin Huerta MD  Referring Provider: Tom Guzman DO   Primary Care Physician: Cory Bhatt MD  Date: 4/18/2021    PRE-PROCEDURE    Code Status: FULL CODE  Brief History/Pre-Procedure Diagnosis:   Symptomatic AFib on therapeutic [address] [place] [address] Road and getting worse  < 48 hours duration    Consent: : I have discussed with the patient risks, benefits, and alternatives (and relevant risks, benefits, and side effects related to alternatives or not receiving care), and likelihood of the success. The patient and/or representative appear to understand and agree to proceed. The discussion encompasses risks, benefits, and side effects related to the alternatives and the risks related to not receiving the proposed care, treatment, and services. The indication, risks and benefits of the procedure and possible therapeutic consequences and alternatives were discussed with the patient. The patient was given the opportunity to ask questions and to have them answered to his/her satisfaction. Risks of the procedure include but are not limited to the following: Bleeding, hematoma including retroperitoneal hemmorhage, infection, pain and discomfort, injury to the aorta and other blood vessels, rhythm disturbance, low blood pressure, myocardial infarction, stroke, kidney damage/failure, myocardial perforation, allergic reactions to sedatives/contrast material, loss of pulse/vascular compromise requiring surgery, aneurysm/pseudoaneurysm formation, possible loss of a limb/hand/leg, needing blood transfusion, requiring emergent open heart surgery or emergent coronary intervention, the need for intubation/respiratory support, the requirement for defibrillation/cardioversion, and death.  Alternatives to and omission of the suggested procedure were , Other, Diana Naqvi MD, Stopped at 04/17/21 2306    losartan (COZAAR) tablet 25 mg, 25 mg, Oral, Daily, Jenelle Leiva MD, 25 mg at 04/18/21 0802    sodium chloride flush 0.9 % injection 10 mL, 10 mL, Intravenous, 2 times per day, Jenelle Leiva MD, 10 mL at 04/18/21 1127    sodium chloride flush 0.9 % injection 10 mL, 10 mL, Intravenous, PRN, Jenelle Leiva MD    0.9 % sodium chloride infusion, 25 mL, Intravenous, PRN, Jenelle Leiva MD    promethazine (PHENERGAN) tablet 12.5 mg, 12.5 mg, Oral, Q6H PRN **OR** ondansetron (ZOFRAN) injection 4 mg, 4 mg, Intravenous, Q6H PRN, Jenelle Leiva MD    polyethylene glycol (GLYCOLAX) packet 17 g, 17 g, Oral, Daily PRN, Jenelle Leiva MD    acetaminophen (TYLENOL) tablet 650 mg, 650 mg, Oral, Q6H PRN **OR** acetaminophen (TYLENOL) suppository 650 mg, 650 mg, Rectal, Q6H PRN, Jenelle Leiva MD    metoprolol tartrate (LOPRESSOR) tablet 25 mg, 25 mg, Oral, BID, Jenelle Leiva MD, 25 mg at 04/18/21 0802    metoprolol (LOPRESSOR) injection 5 mg, 5 mg, Intravenous, Q2H PRN, Jenelle Levia MD, 5 mg at 04/17/21 2313  Prior to Admission medications    Medication Sig Start Date End Date Taking?  Authorizing Provider   losartan (COZAAR) 25 MG tablet Take 25 mg by mouth daily   Yes Historical Provider, MD   metFORMIN (GLUCOPHAGE) 500 MG tablet Take 500 mg by mouth 2 times daily (with meals)   Yes Historical Provider, MD   warfarin (COUMADIN) 5 MG tablet Take 5 mg by mouth 2.5 tablets on MWFSa, 3 tablets SuTTh   Yes Historical Provider, MD     Additional information:       VITAL SIGNS   Vitals:    04/18/21 1900   BP: 130/68   Pulse:    Resp:    Temp:    SpO2:        PHYSICAL:   General: No acute distress  HEENT:  Unremarkable for age  Neck: without increased JVD, carotid pulses 2+ bilaterally without bruits  Heart: Tachycardic, irregularly-irregular, S1 & S2 WNL, S4 gallop, without murmurs or rubs   NYHA: 2  Lungs: Clear to

## 2021-04-18 NOTE — ED NOTES
Perfect serve message sent to Dr. Joshua Leigh requesting cardiology to admit due to hospitalist request.  Message received back stating that cardiology does not admit. Dr. Joshua Leigh to speak with the house supervisor.      [address] Dumont RN  04/17/21 2025

## 2021-04-18 NOTE — ED NOTES
Adenocard held at this time per Dr. Drew Acosta who spoke with Dr. Adryan Chapa at [facility] and requested if patient was stable to hold off on further medications at this time.      Fatemeh Ayoub RN  04/17/21 2032

## 2021-04-18 NOTE — ED NOTES
's office called for Medic Transport from [facility] to [address] [place] [address].      Daniela Mart RN  04/17/21 2030

## 2021-04-18 NOTE — CONSULTS
[facility]    Patient's Name/Date of Birth: Katja Menezes / 1970 (75 y.o.)    Date: April 18, 2021     Referring Provider: Syd Kang DO    CHIEF COMPLAINT: Afib/flutter      HPI: This is a pleasant 53 y.o. male presents with hx of bilateral PE on coumadin, GAETANO on CPAP, DM II, + smoker who presented with complaints of racing and pounding heart beat with palpitations. He did consume multiple Monster energy drinks yesterday. He was sitting on his couch watching TV, when the symptoms came on suddenly at around 8-9 pm.  Denied f/c/ns. No LOC. TSH and trop wnl. Cr 1.3. Patient given IV CCB, digoxin, BB. Adenosine given and found to be in aflutter. ECG now shows Afib (aflutter not seen or documented). HR still gets into the 140s. No syncope. BP 120s. He has been started on Amiodarone gtt. He is fairly sure that the symptoms started yesterday and he has not been in the rhythm for more than 24-48 hours and he is chronically on Coumadin. INR 2.1. CXR without acute process. Today, he was noted to have pauses, and was also found to have NSVT. He is also very fatigued and diaphoretic. He cannot ambulate without getting exacerbations of his tachycardia. He is very consistent with his Coumadin and has not missed a dose. He knows exactly when the symptoms started and it has not been 48 hours. Echo: No results found for this or any previous visit.      All labs, EKG's, diagnostic testing and images as well as cardiac cath, stress testing were reviewed during this encounter    Past Medical History:   Diagnosis Date    Blood circulation, collateral     Blood clot in vein     right leg and each lung    Diabetes mellitus ([address]     Disease of blood and blood forming organ     Blood clots, PE, DVT     Hypertension     Other disorders of kidney and ureter in diseases classified elsewhere     Pneumonia 02/2021    Pulmonary emboli ([address] 02/2021     History reviewed. No pertinent surgical history.   Current Facility-Administered Medications   Medication Dose Route Frequency Provider Last Rate Last Admin    dilTIAZem (CARDIZEM) tablet 60 mg  60 mg Oral 4 times per day Carlos M Estefani, DO   60 mg at 04/18/21 1659    amiodarone (NEXTERONE) 150 mg in dextrose 5% 100 ml  150 mg Intravenous Once Carols M Estefani,  mL/hr at 04/18/21 1852 150 mg at 04/18/21 1852    Followed by   Amina Arroyo amiodarone (NEXTERONE) 360 mg in dextrose 5% 200 ml  1 mg/min Intravenous Continuous Carlos M Estefani, DO        Followed by   Maddy Gimenez ON 4/19/2021] amiodarone (NEXTERONE) 360 mg in dextrose 5% 200 ml  0.5 mg/min Intravenous Continuous Carlos M Estefani, DO        warfarin (COUMADIN) daily dosing (placeholder)   Other RX Placeholder Lani Lucas MD   Stopped at 04/17/21 2306    losartan (COZAAR) tablet 25 mg  25 mg Oral Daily Lani Lucas MD   25 mg at 04/18/21 0802    sodium chloride flush 0.9 % injection 10 mL  10 mL Intravenous 2 times per day Lani Lucas MD   10 mL at 04/18/21 1127    sodium chloride flush 0.9 % injection 10 mL  10 mL Intravenous PRN Lani Lucas MD        0.9 % sodium chloride infusion  25 mL Intravenous PRN Lani Lucas MD        promethazine (PHENERGAN) tablet 12.5 mg  12.5 mg Oral Q6H PRN Lani Lucas MD        Or    ondansetron (ZOFRAN) injection 4 mg  4 mg Intravenous Q6H PRN Lani Lucas MD        polyethylene glycol (GLYCOLAX) packet 17 g  17 g Oral Daily PRN Lani Lucas MD        acetaminophen (TYLENOL) tablet 650 mg  650 mg Oral Q6H PRN Lani Lucas MD        Or    acetaminophen (TYLENOL) suppository 650 mg  650 mg Rectal Q6H PRN Lani Lucas MD        metoprolol tartrate (LOPRESSOR) tablet 25 mg  25 mg Oral BID Lani Lucas MD   25 mg at 04/18/21 0802    metoprolol (LOPRESSOR) injection 5 mg  5 mg Intravenous Q2H PRN Lani Lucas MD   5 mg at 04/17/21 2314     Prior to Admission medications    Medication Sig Start Date End Date Taking? Authorizing Provider   losartan (COZAAR) 25 MG tablet Take 25 mg by mouth daily   Yes Historical Provider, MD   metFORMIN (GLUCOPHAGE) 500 MG tablet Take 500 mg by mouth 2 times daily (with meals)   Yes Historical Provider, MD   warfarin (COUMADIN) 5 MG tablet Take 5 mg by mouth 2.5 tablets on MWFSa, 3 tablets SuTTh   Yes Historical Provider, MD   Scheduled Meds:   dilTIAZem  60 mg Oral 4 times per day    amiodarone  150 mg Intravenous Once    warfarin (COUMADIN) daily dosing (placeholder)   Other RX Placeholder    losartan  25 mg Oral Daily    sodium chloride flush  10 mL Intravenous 2 times per day    metoprolol tartrate  25 mg Oral BID     Continuous Infusions:   amiodarone      Followed by   Zhao Lopez ON 2021] amiodarone      sodium chloride       PRN Meds:.sodium chloride flush, sodium chloride, promethazine **OR** ondansetron, polyethylene glycol, acetaminophen **OR** acetaminophen, metoprolol    No Known Allergies  History reviewed. No pertinent family history.   Social History     Socioeconomic History    Marital status: Single     Spouse name: Not on file    Number of children: Not on file    Years of education: Not on file    Highest education level: Not on file   Occupational History    Not on file   Social Needs    Financial resource strain: Not on file    Food insecurity     Worry: Not on file     Inability: Not on file    Transportation needs     Medical: Not on file     Non-medical: Not on file   Tobacco Use    Smoking status: Former Smoker     Packs/day: 1.50     Years: 15.00     Pack years: 22.50     Quit date: 2021     Years since quittin.2    Smokeless tobacco: Never Used   Substance and Sexual Activity    Alcohol use: Not Currently     Frequency: Never    Drug use: Never    Sexual activity: Yes   Lifestyle    Physical activity     Days per week: Not on file     Minutes per session: Not on file    Stress: Not on file   Relationships    Social connections     Talks on phone: Not on file     Gets together: Not on file     Attends Evangelical service: Not on file     Active member of club or organization: Not on file     Attends meetings of clubs or organizations: Not on file     Relationship status: Not on file    Intimate partner violence     Fear of current or ex partner: Not on file     Emotionally abused: Not on file     Physically abused: Not on file     Forced sexual activity: Not on file   Other Topics Concern    Not on file   Social History Narrative    Not on file     ROS:   Constitutional: Denies any recent wt change. Eyes:  Denies any blurring or double vision, no glaucoma  Ears/Nose/Mouth/Throat:  Denies any chronic sinus/rhinitis, bleeding gums  Cardiovascular:  As described above. Respiratory:  Denies any frequent cough, wheezing or coughing up blood  Genitourinary:  Denies difficulty with urination and kidney stones  Gastrointestinal:  Denies any chronic problems with abdominal pain, nausea, vomiting or diarrhea  Musculoskeletal:  Denies any joint pain, back pain, or difficulty walking  Integumentary:  Denies any rash  Neurological:  No numbness or tingling  Endocrine:  Denies any polydipsia. Hematologic/Lymphatic:  Denies any hemorrhage or lymphatic drainage problems. Labs:  CBC:   Recent Labs     04/17/21 1810 04/18/21  0352   WBC 9.7 8.5   HGB 17.0 16.0   HCT 52.1* 49.6   MCV 96.6* 94.8*    177     BMP:   Recent Labs     04/17/21  1810 04/17/21  2223 04/18/21  1630     --  138   K 4.9  --  4.7     --  103   CO2 31  --  27   BUN 24*  --  19   CREATININE 1.3  --  0.7   MG 2.0 2.0 2.2     Accucheck Glucoses:   Recent Labs     04/17/21  1833 04/18/21  0359 04/18/21  1527   POCGLU 90 97 143*     Cardiac Enzymes:   Recent Labs     04/17/21 1810   TROPONINI < 0.017     PT/INR:   Recent Labs     04/17/21  1810 04/18/21  0352   INR 2.12* 2.10*     APTT: No results for input(s): APTT in the last 72 hours.   Liver Profile:  Lab Results   Component Value Date    AST 15 04/17/2021    ALT 21 04/17/2021    BILITOT 0.6 04/17/2021    ALKPHOS 90 04/17/2021   No results found for: CHOL, HDL, TRIG  TSH:   Lab Results   Component Value Date    TSH 1.790 04/17/2021     UA: No results found for: NITRITE, COLORU, PHUR, LABCAST, [address], RBCUA, MUCUS, TRICHOMONAS, YEAST, BACTERIA, CLARITYU, SPECGRAV, LEUKOCYTESUR, UROBILINOGEN, BILIRUBINUR, BLOODU, GLUCOSEU, AMORPHOUS      Physical Exam:  Vitals:    04/18/21 1659   BP: 122/71   Pulse:    Resp:    Temp:    SpO2:         Intake/Output Summary (Last 24 hours) at 4/18/2021 1857  Last data filed at 4/18/2021 1356  Gross per 24 hour   Intake 592 ml   Output 300 ml   Net 292 ml      General:  No acute distress  Neck: Supple, no JVD, no carotid bruits  Heart: Irregular rhythm normal S1 and S2, no rubs, murmurs or gallops  Lungs: clear to ascultation no rales, wheezes, or rhonchi  Abdomen: positive bowel sounds, soft, non-tender, non-distended, no bruits, no masses  Extremities:no clubbing, cyanosis or edema  Neurologic: alert and oriented x 3, cranial nerves 2-12 grossly intact, motor and sensory intact, moving all extremities  Skin: No rashes    Assessment:  Symptomatic Atrial Fibrillation RVR 2/2 to GAETANO/Energy Drinks  Atrial Flutter  - on initial ECG  Bilateral PE on Coumadin chronically  GAETANO on CPAP  DM II  Pauses 2.8s - likely related to the fact that we have given multiple SN and AVN blockers      Plan:  1. NPO  2. DCCV today - He is very consistent with his Coumadin and has not missed a dose. He knows  exactly when the symptoms started and it has not been 48 hours. 3.         Continue Amiodarone gtt for the short term, would not discharge on this drug, hopefully will  convert and then avoid stimulants  4. Can hold BB/Diltiazem if need for pauses  5. Continue Warfarin  6. If recurrent, would consider EP for ablation  7.          Further recommendations based on results and clinical course    Thank you for allowing us to participate in the care of this patient. Please do not hesitate to call us with questions.     Electronically signed by Nik Dodd MD on 4/18/2021 at 6:57 PM    Interventional Cardiology - [facility]

## 2021-04-18 NOTE — PROGRESS NOTES
Clinical Pharmacy Note    Augustin Leavitt is a 53 y.o. male for whom pharmacy has been asked to manage warfarin therapy. Reason for Admission: history of PE/DVT, new onset afib    Consulting Physician: Dr. Clemente Mosley  Warfarin dose prior to admission: 3 mg SuTuTh, 2.5 mg MWFSa  Warfarin indication: hx PE, DVT, new onset afib  Target INR range: 2-3   Outpatient warfarin provider: [address] [place] [address]     Past Medical History:   Diagnosis Date    Blood circulation, collateral     Blood clot in vein     right leg and each lung    Diabetes mellitus ([facility] [address]     Disease of blood and blood forming organ     Blood clots, PE, DVT     Hypertension     Other disorders of kidney and ureter in diseases classified elsewhere     Pneumonia 02/2021    Pulmonary emboli ([facility] [address] 02/2021              Recent Labs     04/18/21  0352   INR 2.10*     Recent Labs     04/17/21  1810 04/18/21  0352   HGB 17.0 16.0   HCT 52.1* 49.6    177     Current warfarin drug-drug interactions: None    Date INR Warfarin Dose   4/18/2021 2.10 3 mg                                   Daily PT/INR until stable within therapeutic range. Thank you for the consult.      Damaris Gomez, PharmD  4/18/2021  11:39 AM

## 2021-04-18 NOTE — PROGRESS NOTES
patient did state that he had consumed approximately [employer] energy drinks that same day. He decided to report to the emergency department when the palpitations did no resolve. The patient denied previous events of palpitations. He also denied fever, chills, chest pain, dizziness, syncope, decreased sensation or numbness of limbs, nausea, vomiting or change in bowel/bladder habits. Of considerable note the patient stated that he sustained a case on pneumonia in February 2021 to which he was found to have bilateral pulmonary emobli as well as \"groin clot\". [facility] ED course: Sodium 139, potassium 4.9, chloride 103, CO2 31, BUN 24, creatinine 1.3, anion gap 5, EGFR 62, glucose 90. Troponin <0.010, <0.010, TSH 1.790    EKG: Atrial flutter with 2:1 A-V conduction    The patient was administered bolus of diltiazem (20 mg) followed by titration (15 mg/h), and digoxin 0.5 mg IV without resolution of arrhythmia. The patient was subsequently transferred to [facility] for further care and management. Upon arrival the patient was noted to be in SVT per admitting physician and given adenosine 6 mg followed by 12 mg with resolution of tachycardia and visualization of underlying atrial flutter. Patient was started on 25 mg metoprolol tartrate twice daily, continued on diltiazem gtt and transitioned to oral diltiazem. Cardiology was consulted. Subjective (past 24 hours): Patient is feeling well and somewhat tired to which he attributes to late night and inability to sleep in the hospital. He stated that he still feels the palpitations but denies chest pain or shortness of breath. The plan to have cardiology assess as well as ECHO was discussed and he was agreeable to the plan. ROS (12 point review of systems completed. Pertinent positives noted. Otherwise ROS is negative).     Medications:  Reviewed    Infusion Medications    [Held by provider] dilTIAZem (CARDIZEM) 125 mg in dextrose 5% 125 mL infusion Stopped (04/18/21 0545)    sodium chloride       Scheduled Medications    dilTIAZem  30 mg Oral 4 times per day    warfarin (COUMADIN) daily dosing (placeholder)   Other RX Placeholder    losartan  25 mg Oral Daily    sodium chloride flush  10 mL Intravenous 2 times per day    metoprolol tartrate  25 mg Oral BID     PRN Meds: sodium chloride flush, sodium chloride, promethazine **OR** ondansetron, polyethylene glycol, acetaminophen **OR** acetaminophen, metoprolol    No intake or output data in the 24 hours ending 04/18/21 3907    Diet:  DIET GENERAL; No Added Salt (3-4 GM)    Exam:  /70   Pulse 120   Temp 97.8 °F (36.6 °C) (Oral)   Resp 20   Ht 6' 1\" (1.854 m)   Wt (!) 334 lb 9.6 oz (151.8 kg)   SpO2 96%   BMI 44.15 kg/m²     Physical Exam  Vitals signs and nursing note reviewed. Constitutional:       General: He is awake. He is not in acute distress. Appearance: Normal appearance. He is morbidly obese. He is not ill-appearing or toxic-appearing. HENT:      Head: Normocephalic and atraumatic. Right Ear: External ear normal.      Left Ear: External ear normal.      Nose: Nose normal.      Mouth/Throat:      Mouth: Mucous membranes are moist.      Pharynx: Oropharynx is clear. Eyes:      Conjunctiva/sclera: Conjunctivae normal.      Pupils: Pupils are equal, round, and reactive to light. Neck:      Musculoskeletal: Normal range of motion and neck supple. Cardiovascular:      Rate and Rhythm: Tachycardia present. Rhythm regularly irregular. Pulses: Normal pulses. Dorsalis pedis pulses are 2+ on the right side and 2+ on the left side. Posterior tibial pulses are 2+ on the right side and 2+ on the left side. Heart sounds: Normal heart sounds. No murmur. Pulmonary:      Effort: Pulmonary effort is normal.      Breath sounds: Normal breath sounds. Abdominal:      General: Bowel sounds are normal.      Palpations: Abdomen is soft.    Musculoskeletal: Normal range of motion. Right lower leg: No edema. Left lower leg: No edema. Skin:     General: Skin is warm and dry. Capillary Refill: Capillary refill takes less than 2 seconds. Neurological:      General: No focal deficit present. Mental Status: He is alert and oriented to person, place, and time. Mental status is at baseline. GCS: GCS eye subscore is 4. GCS verbal subscore is 5. GCS motor subscore is 6. Cranial Nerves: Cranial nerves are intact. Sensory: Sensation is intact. Motor: Motor function is intact. Coordination: Coordination is intact. Psychiatric:         Attention and Perception: Attention and perception normal.         Mood and Affect: Mood and affect normal.         Speech: Speech normal.         Behavior: Behavior normal. Behavior is cooperative. Thought Content: Thought content normal.         Cognition and Memory: Cognition and memory normal.         Judgment: Judgment normal.         Labs:  Recent Labs     04/17/21  1810 04/18/21  0352   WBC 9.7 8.5   HGB 17.0 16.0   HCT 52.1* 49.6    177     Recent Labs     04/17/21  1810      K 4.9      CO2 31   BUN 24*   CREATININE 1.3     Recent Labs     04/17/21  1810   AST 15   ALT 21   BILITOT 0.6   ALKPHOS 90     Recent Labs     04/17/21  1810 04/18/21  0352   INR 2.12* 2.10*     Recent Labs     04/17/21  1810   TROPONINI < 0.017       Microbiology:    Urinalysis:   No results found for: Aziza Beau, BACTERIA, RBCUA, BLOODU, [address], [address]    Radiology:  XR CHEST PORTABLE   Final Result   There is no acute intrathoracic process. **This report has been created using voice recognition software. It may contain minor errors which are inherent in voice recognition technology. **      Final report electronically signed by Dr Fatimah Harrell on 4/17/2021 6:36 PM          DVT prophylaxis:  [] Lovenox  [] SCDs  [] SQ Heparin  [] Encourage ambulation  [x] Already on anticoagulation  [] Other -      Code Status: Full Code    PT/OT Eval Status: None    Tele:   [x] Yes             [] No    Electronically signed by Kole Negro DO on 4/18/2021 at 8:33 AM

## 2021-04-18 NOTE — H&P
Patient: Em Herrera    Unit/Bed: 9O-59/900-O    YOB: 1970    MRN: [id number]    Acct: [de-identified]     Admitting Diagnosis: Palpitations [R00.2]  Paroxysmal atrial fibrillation with RVR ([facility] [I48.0]    Admit Date:  4/17/2021    CC: Palpitations x 2 days. HPI :  56-year-old male patient who is a direct admission from [place] ED where he presented with a 2-day history of palpitations. Diagnosed with narrow complex tachycardia suggestive of atrial flutter. Patient started on Cardizem drip and give IV Digoxin 500 mcg x 1 dose w/o successful rate control. His heart rate on arrival was in the high 160s. Patient's developed palpitations and diaphoresis around 9 PM last night. Palpitations continued today, hence, the visit to the ED. Denies any associated chest pain or shortness of breath. He has no history of CAD or dysrhythmia. He has been drinking energy Monster drink about 6 to 8 cans a day because of low energy for the past 2 weeks. Risk factors for CAD include hypertension, type DM not on insulin ,chronic tobacco use disorder but patient quit in February 2021. Used to smoke one and half pack a day for more than 20 years. History of PE on long-term anticoagulation with Coumadin and therapeutic INR of 2.12. Vital signs at the time of admission temperature 36.8 °C, respirate of 24 breaths/min, heart rate 165 bpm, blood pressure 106/74, oxygen saturation 94% on room air. A 12-lead EKG which reviewed shows SVT at 166 bpm. No ST segment elevation or depression. QTC of 448 ms. Treatment given at the time of admission here at [facility] include IV adenosine 6 mg followed by 12 mg for SVT. Underlying rhythm after rate control with IV adenosine is atrial flutter. Treatment given from the referring ED include Cardizem drip and digoxin  mcg x 1 dose. Review of Systems   Constitutional: Positive for activity change, diaphoresis and fatigue. Negative for appetite change, chills, fever and unexpected weight change. HENT: Negative for congestion, dental problem, drooling, ear discharge, ear pain, facial swelling, hearing loss, mouth sores, nosebleeds, postnasal drip, rhinorrhea, sinus pressure, sinus pain, sneezing, sore throat, tinnitus, trouble swallowing and voice change. Eyes: Negative for photophobia, pain, discharge, redness, itching and visual disturbance. Respiratory: Negative for apnea, cough, choking, chest tightness, shortness of breath, wheezing and stridor. Cardiovascular: Negative for chest pain, palpitations and leg swelling. Gastrointestinal: Negative for abdominal distention, abdominal pain, anal bleeding, blood in stool, constipation, diarrhea, nausea, rectal pain and vomiting. Endocrine: Negative for cold intolerance, heat intolerance, polydipsia, polyphagia and polyuria. Genitourinary: Negative for decreased urine volume, difficulty urinating, discharge, dysuria, enuresis, flank pain, frequency, genital sores, hematuria, penile pain, penile swelling, scrotal swelling, testicular pain and urgency. Musculoskeletal: Negative for arthralgias, back pain, gait problem, joint swelling, myalgias, neck pain and neck stiffness. Skin: Negative for color change, pallor, rash and wound. Allergic/Immunologic: Negative for food allergies. Neurological: Negative for dizziness, tremors, seizures, syncope, facial asymmetry, speech difficulty, weakness, light-headedness, numbness and headaches. Hematological: Negative for adenopathy. Does not bruise/bleed easily. Psychiatric/Behavioral: Negative for agitation, behavioral problems, confusion, decreased concentration, dysphoric mood, hallucinations, self-injury, sleep disturbance and suicidal ideas. The patient is not nervous/anxious and is not hyperactive.       Past Medical History:        Diagnosis Date    Blood circulation, collateral     Blood clot in vein     right leg and each lung    Diabetes mellitus ([facility]     Disease of blood and blood forming organ     Blood clots, PE, DVT     Hypertension     Other disorders of kidney and ureter in diseases classified elsewhere     Pneumonia 02/2021    [facility] ([facility] 02/2021       Past Surgical History:    History reviewed. No pertinent surgical history. Medications Prior to Admission:    Prior to Admission medications    Medication Sig Start Date End Date Taking? Authorizing Provider   losartan (COZAAR) 25 MG tablet Take 25 mg by mouth daily   Yes Historical Provider, MD   metFORMIN (GLUCOPHAGE) 500 MG tablet Take 500 mg by mouth 2 times daily (with meals)   Yes Historical Provider, MD   warfarin (COUMADIN) 5 MG tablet Take 5 mg by mouth 2.5 tablets on MWFSa, 3 tablets SuTTh   Yes Historical Provider, MD       Allergies:    Patient has no known allergies. Social History:    TOBACCO:   reports that he quit smoking about 2 months ago. He has a 22.50 pack-year smoking history. He has never used smokeless tobacco.    ETOH:   reports previous alcohol use. Family History:    History reviewed. No pertinent family history. Objective:   /77   Pulse 98 Comment: HR range  bpm   Temp 98.2 °F (36.8 °C) (Oral)   Resp 28   Ht 6' 1\" (1.854 m)   Wt (!) 334 lb 9.6 oz (151.8 kg)   SpO2 95%   BMI 44.15 kg/m²   No intake or output data in the 24 hours ending 04/18/21 0000    Physical Exam  Vitals signs and nursing note reviewed. Constitutional:       General: He is not in acute distress. Appearance: Normal appearance. He is obese. He is not ill-appearing, toxic-appearing or diaphoretic. HENT:      Head: Normocephalic and atraumatic. Right Ear: External ear normal.      Left Ear: External ear normal.      Nose: Nose normal. No congestion or rhinorrhea. Mouth/Throat:      Mouth: Mucous membranes are moist.      Pharynx: Oropharynx is clear. No oropharyngeal exudate or posterior oropharyngeal erythema. Eyes:      General: No scleral icterus. Right eye: No discharge. Left eye: No discharge. Extraocular Movements: Extraocular movements intact. Conjunctiva/sclera: Conjunctivae normal.      Pupils: Pupils are equal, round, and reactive to light. Neck:      Musculoskeletal: Normal range of motion and neck supple. No neck rigidity or muscular tenderness. Vascular: No carotid bruit. Cardiovascular:      Rate and Rhythm: Regular rhythm. Tachycardia present. Pulses: Normal pulses. Heart sounds: Normal heart sounds. No murmur. No friction rub. No gallop. Pulmonary:      Effort: Pulmonary effort is normal. No respiratory distress. Breath sounds: Normal breath sounds. No stridor. No wheezing, rhonchi or rales. Chest:      Chest wall: No tenderness. Abdominal:      General: Bowel sounds are normal. There is no distension. Palpations: Abdomen is soft. There is no mass. Tenderness: There is no abdominal tenderness. There is no right CVA tenderness, left CVA tenderness, guarding or rebound. Hernia: No hernia is present. Musculoskeletal: Normal range of motion. General: No swelling, tenderness, deformity or signs of injury. Right lower leg: No edema. Left lower leg: No edema. Lymphadenopathy:      Cervical: No cervical adenopathy. Skin:     General: Skin is warm. Coloration: Skin is not jaundiced or pale. Findings: No bruising, erythema, lesion or rash. Neurological:      General: No focal deficit present. Mental Status: He is alert and oriented to person, place, and time. Mental status is at baseline. Cranial Nerves: No cranial nerve deficit. Sensory: No sensory deficit. Motor: No weakness.       Coordination: Coordination normal.      Gait: Gait normal.      Deep Tendon Reflexes: Reflexes normal.   Psychiatric:         Mood and Affect: Mood normal.         Behavior: Behavior normal.         Thought 2/2021. 5.HEM-ONC. Long term [address] w/ coumadin-INR 2.12. Macrocytosis w/o anemia-Hgb 17 and MCV 96.6. B12 and Folate check. 6.NUTRITION. Obesity w/ a BMI of 43.01-needs regular exercise and diet control. 7.DISPO. Planned d/c to home vs rehab soon.       Electronically signed by Bryon Mendez MD on 4/18/2021 at 12:00 AM

## 2021-04-19 VITALS
TEMPERATURE: 97.8 F | HEART RATE: 70 BPM | HEIGHT: 73 IN | DIASTOLIC BLOOD PRESSURE: 59 MMHG | WEIGHT: 315 LBS | BODY MASS INDEX: 41.75 KG/M2 | OXYGEN SATURATION: 95 % | SYSTOLIC BLOOD PRESSURE: 119 MMHG | RESPIRATION RATE: 16 BRPM

## 2021-04-19 LAB
ANION GAP SERPL CALCULATED.3IONS-SCNC: 6 MEQ/L (ref 8–16)
BUN BLDV-MCNC: 16 MG/DL (ref 7–22)
CALCIUM SERPL-MCNC: 8.9 MG/DL (ref 8.5–10.5)
CHLORIDE BLD-SCNC: 103 MEQ/L (ref 98–111)
CO2: 29 MEQ/L (ref 23–33)
CREAT SERPL-MCNC: 0.7 MG/DL (ref 0.4–1.2)
EKG ATRIAL RATE: 73 BPM
EKG P AXIS: 40 DEGREES
EKG P-R INTERVAL: 154 MS
EKG Q-T INTERVAL: 384 MS
EKG QRS DURATION: 92 MS
EKG QTC CALCULATION (BAZETT): 423 MS
EKG R AXIS: 26 DEGREES
EKG T AXIS: 38 DEGREES
EKG VENTRICULAR RATE: 73 BPM
GFR SERPL CREATININE-BSD FRML MDRD: > 90 ML/MIN/1.73M2
GLUCOSE BLD-MCNC: 101 MG/DL (ref 70–108)
GLUCOSE BLD-MCNC: 92 MG/DL (ref 70–108)
INR BLD: 1.88 (ref 0.85–1.13)
LV EF: 53 %
LVEF MODALITY: NORMAL
MAGNESIUM: 2.1 MG/DL (ref 1.6–2.4)
POTASSIUM SERPL-SCNC: 4.8 MEQ/L (ref 3.5–5.2)
REASON FOR REJECTION: NORMAL
REJECTED TEST: NORMAL
SODIUM BLD-SCNC: 138 MEQ/L (ref 135–145)

## 2021-04-19 PROCEDURE — 80048 BASIC METABOLIC PNL TOTAL CA: CPT

## 2021-04-19 PROCEDURE — 82948 REAGENT STRIP/BLOOD GLUCOSE: CPT

## 2021-04-19 PROCEDURE — 2580000003 HC RX 258: Performed by: INTERNAL MEDICINE

## 2021-04-19 PROCEDURE — 93010 ELECTROCARDIOGRAM REPORT: CPT | Performed by: NUCLEAR MEDICINE

## 2021-04-19 PROCEDURE — 83735 ASSAY OF MAGNESIUM: CPT

## 2021-04-19 PROCEDURE — 93246 EXT ECG>7D<15D RECORDING: CPT

## 2021-04-19 PROCEDURE — 85610 PROTHROMBIN TIME: CPT

## 2021-04-19 PROCEDURE — 99239 HOSP IP/OBS DSCHRG MGMT >30: CPT | Performed by: INTERNAL MEDICINE

## 2021-04-19 PROCEDURE — 93306 TTE W/DOPPLER COMPLETE: CPT

## 2021-04-19 PROCEDURE — 6370000000 HC RX 637 (ALT 250 FOR IP): Performed by: INTERNAL MEDICINE

## 2021-04-19 PROCEDURE — 99232 SBSQ HOSP IP/OBS MODERATE 35: CPT | Performed by: NURSE PRACTITIONER

## 2021-04-19 PROCEDURE — 36415 COLL VENOUS BLD VENIPUNCTURE: CPT

## 2021-04-19 RX ORDER — WARFARIN SODIUM 7.5 MG/1
15 TABLET ORAL
Status: DISCONTINUED | OUTPATIENT
Start: 2021-04-19 | End: 2021-04-19 | Stop reason: HOSPADM

## 2021-04-19 RX ORDER — WARFARIN SODIUM 3 MG/1
3 TABLET ORAL
Status: DISCONTINUED | OUTPATIENT
Start: 2021-04-19 | End: 2021-04-19

## 2021-04-19 RX ADMIN — LOSARTAN POTASSIUM 25 MG: 25 TABLET, FILM COATED ORAL at 08:49

## 2021-04-19 RX ADMIN — SODIUM CHLORIDE, PRESERVATIVE FREE 10 ML: 5 INJECTION INTRAVENOUS at 08:49

## 2021-04-19 RX ADMIN — METOPROLOL TARTRATE 25 MG: 25 TABLET, FILM COATED ORAL at 08:49

## 2021-04-19 ASSESSMENT — PAIN SCALES - GENERAL: PAINLEVEL_OUTOF10: 0

## 2021-04-19 NOTE — CARE COORDINATION
4/19/21, 7:16 AM EDT  DISCHARGE PLANNING EVALUATION:    [facility]       Admitted: 4/17/2021/ [address] day: 2   Location: [facility]- Reason for admit: Palpitations [R00.2]  Paroxysmal atrial fibrillation with RVR ([facility] [I48.0]   PMH:  has a past medical history of Blood circulation, collateral, Blood clot in vein, Diabetes mellitus ([facility] Disease of blood and blood forming organ, Hypertension, Other disorders of kidney and ureter in diseases classified elsewhere, Pneumonia, and Pulmonary emboli ([facility] Procedure: 04/18 DCCV by Dr Eddie Clarke at bedside  04/19  Echo  EF 50-55%    Barriers to Discharge:  Pt to ED with diaphoresis, fatigue, intermittent exertional dyspnea and chest tightness. Onset was after drinking \"8 [employer] energy drinks \". He states that he has been drinking the energy drinks for a few days to give him energy to do things around the house. Telemetry Atrial fib /SVT initially 160's, Cardizem bolus and gtt,  IV Digoxin at [facility], Adenosine 6mg/then 12 mg IV, cardioverted at bedside by Dr Eddie Clarke, INR 1.88, on Coumadin, Cozaar and Lopressor. PCP: Cory Bhatt MD  Readmission Risk Score: 9%    Patient Goals/Plan/Treatment Preferences: Spoke with Octavia Tatum and his sister Amelia Gallagher; he lives home, has transportation home, PCP, is able to afford medicines. He uses no DME and declines in-home services/HH. Transportation/Food Security/Housekeeping Addressed:  No issues identified. 4/19/21, 12:46 PM EDT    Patient goals/plan/ treatment preferences discussed by  and . Patient goals/plan/ treatment preferences reviewed with patient/ family. Patient/ family verbalize understanding of discharge plan and are in agreement with goal/plan/treatment preferences. Understanding was demonstrated using the teach back method.   AVS provided by RN at time of discharge, which includes all necessary medical information pertaining to the patients current course of illness, treatment, post-discharge goals of care, and treatment preferences.

## 2021-04-19 NOTE — PROGRESS NOTES
Cardiology Progress Note      Patient:  Ida Shah  YOB: 1970  MRN: [id number]   Acct: [de-identified]  [facility] Date:  4/17/2021  Primary Cardiologist: Seen by Dr. Alaina Magdaleno    Per Dr. Lynn De La Cruz consult note:  CHIEF COMPLAINT: Afib/flutter        HPI: This is a pleasant 53 y.o. male presents with hx of bilateral PE on coumadin, GAETANO on CPAP, DM II, + smoker who presented with complaints of racing and pounding heart beat with palpitations. He did consume multiple [employer] energy drinks yesterday. He was sitting on his couch watching TV, when the symptoms came on suddenly at around 8-9 pm.  Denied f/c/ns. No LOC. TSH and trop wnl. Cr 1.3. Patient given IV CCB, digoxin, BB. Adenosine given and found to be in aflutter. ECG now shows Afib (aflutter not seen or documented). HR still gets into the 140s. No syncope. BP 120s. He has been started on Amiodarone gtt. He is fairly sure that the symptoms started yesterday and he has not been in the rhythm for more than 24-48 hours and he is chronically on Coumadin. INR 2.1. CXR without acute process. Today, he was noted to have pauses, and was also found to have NSVT. He is also very fatigued and diaphoretic. He cannot ambulate without getting exacerbations of his tachycardia. He is very consistent with his Coumadin and has not missed a dose. He knows exactly when the symptoms started and it has not been 48 hours. Subjective (Events in last 24 hours): Ff/U AFB/AFL. S/p successful cardioversion. Remains in SR per tele. Denies chest pain, sob, palpitations.       Objective:   BP (!) 119/59   Pulse 70   Temp 97.8 °F (36.6 °C) (Oral)   Resp 16   Ht 6' 1\" (1.854 m)   Wt (!) 334 lb 9.6 oz (151.8 kg)   SpO2 95%   BMI 44.15 kg/m²        TELEMETRY: SR    Physical Exam:  General Appearance: alert and oriented to person, place and time, in no acute distress  Cardiovascular: normal rate, regular rhythm, normal S1 and S2, no murmurs, rubs, clicks, or gallops, distal pulses intact, no carotid bruits, no JVD  Pulmonary/Chest: clear to auscultation bilaterally- no wheezes, rales or rhonchi, normal air movement, no respiratory distress  Abdomen: soft, non-tender, non-distended, normal bowel sounds, no masses   Extremities: no cyanosis, clubbing or edema, pulses present. Skin: warm and dry, no rash or erythema  Head: normocephalic and atraumatic  Eyes: pupils equal, round, and reactive to light  Neck: supple and non-tender without mass, no thyromegaly   Musculoskeletal: normal range of motion, no joint swelling, deformity or tenderness  Neurological: alert, oriented, normal speech, no focal findings or movement disorder noted    Medications:    warfarin  3 mg Oral Once    midazolam  2 mg Intravenous Once    warfarin (COUMADIN) daily dosing (placeholder)   Other RX Placeholder    losartan  25 mg Oral Daily    sodium chloride flush  10 mL Intravenous 2 times per day    metoprolol tartrate  25 mg Oral BID      sodium chloride       sodium chloride flush, 10 mL, PRN  sodium chloride, 25 mL, PRN  promethazine, 12.5 mg, Q6H PRN    Or  ondansetron, 4 mg, Q6H PRN  polyethylene glycol, 17 g, Daily PRN  acetaminophen, 650 mg, Q6H PRN    Or  acetaminophen, 650 mg, Q6H PRN  metoprolol, 5 mg, Q2H PRN        Diagnostics:  EK21  Normal sinus rhythm  Normal ECG  When compared with ECG of 2021 16:35,  Sinus rhythm has replaced Atrial fibrillation  Confirmed by Felix Aponte   Echo: 21  Summary   Technically difficult study due to poor acoustic windows. The left ventricle was not well visualized. Systolic function appears normal.   Ejection fraction is visually estimated at 50-55%.       Signature      ----------------------------------------------------------------   Electronically signed by Kendell Beach MD         Lab Data:    Cardiac Enzymes:  Recent Labs     21  1810   TROPONINI < 0.017       CBC:   Lab Results   Component Value Date    WBC 8.5 04/18/2021    RBC 5.23 04/18/2021    HGB 16.0 04/18/2021    HCT 49.6 04/18/2021     04/18/2021       CMP:    Lab Results   Component Value Date     04/19/2021    K 4.8 04/19/2021     04/19/2021    CO2 29 04/19/2021    BUN 16 04/19/2021    CREATININE 0.7 04/19/2021    LABGLOM >90 04/19/2021    GLUCOSE 101 04/19/2021    CALCIUM 8.9 04/19/2021       Hepatic Function Panel:    Lab Results   Component Value Date    ALKPHOS 90 04/17/2021    ALT 21 04/17/2021    AST 15 04/17/2021    PROT 7.8 04/17/2021    BILITOT 0.6 04/17/2021    LABALBU 3.4 04/17/2021       Magnesium:    Lab Results   Component Value Date    MG 2.1 04/19/2021       PT/INR:    Lab Results   Component Value Date    INR 1.88 04/19/2021       HgBA1c:    Lab Results   Component Value Date    LABA1C 5.7 04/17/2021       FLP:  No results found for: TRIG, HDL, LDLCALC, LDLDIRECT, LABVLDL    TSH:    Lab Results   Component Value Date    TSH 1.790 04/17/2021         Assessment:  · New onset Symptomatic Atrial Fibrillation RVR 2/2 to GAETANO/Energy Drinks  Atrial Flutter  - on initial ECG  S/P successful DOE cardioversion, remains in SR per tele  · Bilateral PE on Coumadin chronically  · GAETANO on CPAP  · DM II  · Pauses 2.8s - likely related to the fact that we have given multiple SN and AVN blockers      Plan:  · Continue coumadin for OAC  · No CCB d/t lower B/P  · Continue lopressor 25 twice daily, losartan 25 mg daily  · Avoid energy drinks, caffeine  · F/U with Dr. Dennis Mackey in 2-4 weeks       Electronically signed by JEREMI Mojica - CNP on 4/19/2021 at 1:59 PM

## 2021-04-19 NOTE — DISCHARGE INSTR - MEDS
Coumadin recommendations for discharge:   Date Warfarin Dose   4/19/2021 15 mg (3 tabs)   4/20/2021 15 mg (3 tabs)   4/21/2021 15 mg (3 tabs)   4/22/2021 15 mg (3 tabs)   4/23/2021 Check INR       Recheck INR:  Friday 4/23/2021 at 11:00AM with results to Denisse Tinoco CNP  @ [address] [phone]

## 2021-04-19 NOTE — PROGRESS NOTES
Cardioversion at bedside with Dr. Marychuy Thomson, Dr. Ivis Farr and DUSTIN Magallanes at bedside. Patient tolerated well. Cardioversion successful. Will continue to monitor closely.

## 2021-04-19 NOTE — PROGRESS NOTES
Clinical Pharmacy Note                                               Warfarin Discharge Recommendations      Pt discharged from [address] [place] [address] today after admission for PSVT    INR today:  Recent Labs     04/19/21  0556   INR 1.88*       Coumadin 5 mg tabs    Home dose of Coumadin prior to admission clarified to be 12.5mg MWFSa; 15mg SuTTh    Interacting medications at discharge: None    INR goal during admission: 2 to 3    Recommendations for discharge:   Date Warfarin Dose   4/19/2021 15 mg (3 tabs)   4/20/2021 15 mg (3 tabs)   4/21/2021 15 mg (3 tabs)   4/22/2021 15 mg (3 tabs)   4/23/2021 Check INR     Provider dosing warfarin: Cedric Freeman CNP @ [address] [phone]    Recheck INR:  Friday 4/23/2021 at 11:00AM with results to [address], PharmD, BCPS 4/19/2021 3:03 PM

## 2021-04-19 NOTE — PROCEDURES
Continuous 14 Day Cardiac Monitor applied. [facility] [id number]. Detailed instructions given to patient and his family.

## 2021-04-19 NOTE — PROGRESS NOTES
Discharge in, EKG called to place cardiac event monitor, states it will be 1600 or so until they can be up to place it. Patient informed of delay. 1515 iv lines removed, patient dressed ready to go home, ambulating in [place]  [phone] discharge teaching completed with family at bedside. 1600 EKG here to place monitor.    1630 discharged to home

## 2021-04-19 NOTE — OP NOTE
Operative Note      Patient: Uriel Batres  YOB: 1970  MRN: [id number]    Date of Procedure:  4/18/21    DCCV    Indication:  Afib    Informed Consent:  The indication, risks and benefits of the procedure and possible therapeutic consequences and alternatives were discussed with the patient. The patient was given the opportunity to ask questions and to have them answered to his/her satisfaction. Risks of the procedure include but are not limited to the following: Bleeding, infection, pain and discomfort, injury to esophagus, rhythm disturbance, low blood pressure, myocardial infarction, stroke, kidney damage/failure, allergic reactions to sedatives/contrast material, loss of pulse/vascular compromise requiring surgery, needing blood transfusion, requiring emergent open heart surgery or emergent coronary intervention, the need for intubation/respiratory support, the requirement for defibrillation/cardioversion, aspiration pneumonia, skin burns, malignant dysrhythmias and death. Alternatives to and omission of the suggested procedure were discussed. The patient had no further questions and wished to proceed; the consent form was signed. Sedation:  Fentanyl/Versed, See EMR for dosages    Airway: Biteblock    Procedure:  Under continuous hemodynamic and respiratory monitoring, the patient was given Fentanyl IV and Versed IV till sedation was judged to be appropriate based on BP, RR, presence of gag reflex, and neurologic status. Pads were applied to the patients chest in the standard position. A biphasic, 360 J shock was delivered x 1. The patient converted to sinus rhythm. ECG performed. Reversal agents were given (0.4 mg Narcan and 0.4 mg of Flumazenil) IV. Neurologic status was assessed and the patient was completely intact without any deficits. Post Procedure:  Orders placed, monitor x 4 hours    Summary:  Successful, uncomplicated DCCV with moderate sedation.       Plan:    1) Monitor post procedure  2) Post procedure OAC  3) D/c Amiodarone  4) Continue BB/CCB  5) TTE in the AM  6) F/u Cardiology 2-4 weeks post procedure    Case and findings discussed with the patient and family. They were agreeable an amenable to the plan.     Electronically signed by Lata Ochoa MD on 4/18/2021 at 8:38 PM

## 2021-04-20 LAB — MRSA SCREEN: NORMAL

## 2021-04-20 NOTE — DISCHARGE SUMMARY
Hospital Medicine Discharge Summary      Patient Identification:  Name: Augustin Leavitt  : 1970  MRN: [id number]  Account: [de-identified]    Patient's PCP: Sahara Matthews MD    Admit Date: 2021    Discharge Date: 2021    Admitting Physician: Yessica Eid MD    Discharge Physician: Joyce Rosales DO        HPI:  Augustin Leavitt is a 53 y.o. male with PMHx of  former smoker (quite 2021) bilateral pulmonary emboli & DVT (2021) on coumadin, NIDDM2, GAETANO, and obesity  who presented to [facility] ED for complaint on palpitations. He stated that these symptoms began on 21 at approximately 2100. He stated that he was not doing anything unordinary and just watching TV on his couch. The patient did state that he had consumed approximately 6-24oz Monster energy drinks that same day. He decided to report to the emergency department when the palpitations did no resolve. The patient denied previous events of palpitations. He also denied fever, chills, chest pain, dizziness, syncope, decreased sensation or numbness of limbs, nausea, vomiting or change in bowel/bladder habits. The patient was administered a bolus of diltiazem (20 mg) followed by titration (15 mg/h), and digoxin 0.5 mg IV without resolution of arrhythmia. The patient was subsequently transferred and admitted to [address] [place] [address] on 2021 for further care and management      Please see chart for more details regarding HPI. Hospital Course:   Augustin Leavitt is a 53 y.o. male who presented to [address] [place] [address] for palpitations    New onset atrial fibrillation/atrial fllutter - Upon arrival to [facility] the patient was noted to be in SVT per admitting physician and given adenosine 6 mg followed by 12 mg with resolution of tachycardia and visualization of underlying atrial flutter. Patient was started on 25 mg metoprolol tartrate twice daily, continued on diltiazem gtt and transitioned to oral diltiazem.   Cardiology was consulted and the patient underwent DCCV at 360J with resolution of afib/flutter and conversion to NSR. The patient was monitored overnight and Echocardiogram completed on 4/19/21 demonstrating EF=50-55%. The patient remained stable and discharged with 14 day event monitor. The patient was stable for discharge - all consultants were contacted and in agreement with plan for discharge. Appropriate follow up appointment was arranged prior to discharge. Please see below or view chart for more details from hospital course. Discharge Diagnoses:    · Atrial fibrillation      The patient was seen and examined on day of discharge and this discharge summary is in conjunction with any daily progress note from day of discharge. The patient understood, was in agreement, and verbalized the plan of care at time of discharge. Exam:    Vitals:   Vitals:    04/19/21 0100 04/19/21 0430 04/19/21 0845 04/19/21 1139   BP: 107/67 (!) 143/87 130/78 (!) 119/59   Pulse: 69  78 70   Resp: 16 16 16 16   Temp:  98.2 °F (36.8 °C) 97.1 °F (36.2 °C) 97.8 °F (36.6 °C)   TempSrc:  Oral Oral Oral   SpO2: 96% 95% 95% 95%   Weight:       Height:         Weight: Weight: (!) 334 lb 9.6 oz (151.8 kg)    24 hour intake/output:    Intake/Output Summary (Last 24 hours) at 4/19/2021 2113  Last data filed at 4/19/2021 1428  Gross per 24 hour   Intake 480 ml   Output 0 ml   Net 480 ml         Physical Exam  Vitals signs and nursing note reviewed. Constitutional:       General: He is awake. He is not in acute distress. Appearance: Normal appearance. He is morbidly obese. He is not ill-appearing or diaphoretic. HENT:      Head: Normocephalic and atraumatic. Right Ear: External ear normal.      Left Ear: External ear normal.      Nose: Nose normal.      Mouth/Throat:      Mouth: Mucous membranes are moist.      Pharynx: Oropharynx is clear.    Eyes:      Conjunctiva/sclera: Conjunctivae normal.      Pupils: Pupils are equal, round, provided:      CBC:    Lab Results   Component Value Date    WBC 8.5 04/18/2021    HGB 16.0 04/18/2021    HCT 49.6 04/18/2021     04/18/2021       Renal:    Lab Results   Component Value Date     04/19/2021    K 4.8 04/19/2021     04/19/2021    CO2 29 04/19/2021    BUN 16 04/19/2021    CREATININE 0.7 04/19/2021    CALCIUM 8.9 04/19/2021         Significant Diagnostic Studies    Radiology:   XR CHEST PORTABLE   Final Result   There is no acute intrathoracic process. **This report has been created using voice recognition software. It may contain minor errors which are inherent in voice recognition technology. **      Final report electronically signed by Dr Leela Anderson on 4/17/2021 6:36 PM            Consults:     [address]    Disposition:    [x] Home       [] TCU       [] Rehab       [] Psych       [] SNF       [] [place]       [] Other-    Condition at Discharge: stable    Code Status:  Full    Patient Instructions:  Discharge lab work: None  Activity: activity as tolerated  Diet: No diet orders on file    Follow-up visits:   Hyun Cruz MD  [address] [address]  [phone]    On 5/4/2021  Keep previous appointment    Lata Ochoa MD  [address] [facility], APRN - CNP  [address]  [phone]    On 5/11/2021  Hospital follow up appointment TIME: 2:00 pm    Coumadin Clinic at [facility]  [phone]  On 4/23/2021  @ 11:00 AM for Coumadin check        Discharge Medications:        Medication List      START taking these medications    metoprolol tartrate 25 MG tablet  Commonly known as: LOPRESSOR  Take 1 tablet by mouth 2 times daily        CONTINUE taking these medications    losartan 25 MG tablet  Commonly known as: COZAAR     metFORMIN 500 MG tablet  Commonly known as: GLUCOPHAGE     warfarin 5 MG tablet  Commonly known as: COUMADIN  Notes to patient: See recommendations below         Medication Instructions:     Coumadin recommendations for discharge:   Date Warfarin Dose   4/19/2021 15 mg (3 tabs)   4/20/2021 15 mg (3 tabs)   4/21/2021 15 mg (3 tabs)   4/22/2021 15 mg (3 tabs)   4/23/2021 Check INR       Recheck INR:  Friday 4/23/2021 at 11:00AM with results to Daisy Thompson CNP  @ [facility] [address] [phone]                     Where to Get Your Medications      These medications were sent to [address] [place] [address] , [address]  [address], [address] [address]    Phone: [phone]   · metoprolol tartrate 25 MG tablet           Discharge Time:  Time spent on discharge is >30 minutes in the examination, evaluation, counseling, and review of medications and discharge plan. The hospital course was discussed with the patient and all questions and concerns were addressed at that time. The patient was in agreement with and verbalized understanding of the plan of care and had no additional questions or complaints. The patient was instructed to follow-up with any scheduled outpatient appointments or to report to the nearest Emergency Department if new or worsening symptoms should arise. Thank you Lisa Oates MD for the opportunity to be involved in this patient's care.     Signed:    Electronically signed by Roshni Zavaleta DO on 4/19/2021 at 9:13 PM

## 2021-04-28 NOTE — PROGRESS NOTES
CLINICAL PHARMACY NOTE: MEDS TO [address] [place], [place] [address] Select Patient?: No  Total # of Prescriptions Filled: 1   The following medications were delivered to the patient:  Metoprolol Tartrate 25 mg  Total # of Interventions Completed: 2  Time Spent (min): 30    Additional Documentation:

## 2021-05-11 ENCOUNTER — OFFICE VISIT (OUTPATIENT)
Dept: CARDIOLOGY CLINIC | Age: 51
End: 2021-05-11
Payer: COMMERCIAL

## 2021-05-11 VITALS
SYSTOLIC BLOOD PRESSURE: 144 MMHG | DIASTOLIC BLOOD PRESSURE: 82 MMHG | HEART RATE: 62 BPM | BODY MASS INDEX: 41.75 KG/M2 | HEIGHT: 73 IN | WEIGHT: 315 LBS

## 2021-05-11 DIAGNOSIS — R06.02 SOB (SHORTNESS OF BREATH) ON EXERTION: ICD-10-CM

## 2021-05-11 DIAGNOSIS — Z87.891 HISTORY OF TOBACCO USE DISORDER: ICD-10-CM

## 2021-05-11 DIAGNOSIS — E11.9 TYPE 2 DIABETES MELLITUS WITHOUT COMPLICATION, WITHOUT LONG-TERM CURRENT USE OF INSULIN (HCC): ICD-10-CM

## 2021-05-11 DIAGNOSIS — I10 HTN (HYPERTENSION), BENIGN: ICD-10-CM

## 2021-05-11 DIAGNOSIS — Z98.890 HISTORY OF CARDIOVERSION: ICD-10-CM

## 2021-05-11 DIAGNOSIS — I48.91 NEW ONSET ATRIAL FIBRILLATION (HCC): ICD-10-CM

## 2021-05-11 PROCEDURE — 99214 OFFICE O/P EST MOD 30 MIN: CPT | Performed by: NURSE PRACTITIONER

## 2021-05-11 NOTE — PROGRESS NOTES
mg by mouth 2 times daily (with meals)      warfarin (COUMADIN) 5 MG tablet Take 5 mg by mouth 2.5 tablets on MWFSa, 3 tablets SuTTh       No current facility-administered medications for this visit. Social History     Socioeconomic History    Marital status: Single     Spouse name: None    Number of children: None    Years of education: None    Highest education level: None   Occupational History    None   Social Needs    Financial resource strain: None    Food insecurity     Worry: None     Inability: None    Transportation needs     Medical: None     Non-medical: None   Tobacco Use    Smoking status: Former Smoker     Packs/day: 1.50     Years: 15.00     Pack years: 22.50     Quit date: 2021     Years since quittin.2    Smokeless tobacco: Never Used   Substance and Sexual Activity    Alcohol use: Not Currently     Frequency: Never    Drug use: Never    Sexual activity: Yes   Lifestyle    Physical activity     Days per week: None     Minutes per session: None    Stress: None   Relationships    Social connections     Talks on phone: None     Gets together: None     Attends Congregational service: None     Active member of club or organization: None     Attends meetings of clubs or organizations: None     Relationship status: None    Intimate partner violence     Fear of current or ex partner: None     Emotionally abused: None     Physically abused: None     Forced sexual activity: None   Other Topics Concern    None   Social History Narrative    None       History reviewed. No pertinent family history. Blood pressure (!) 144/82, pulse 62, height 6' 1\" (1.854 m), weight (!) 334 lb (151.5 kg).     General:   Well developed, well nourished  Lungs:   Clear to auscultation  Heart:    Normal S1 S2, No murmur, rubs, or gallops  Abdomen:   Soft, non tender, no organomegalies, positive bowel sounds  Extremities:   No edema, no cyanosis, good peripheral pulses  Neurological:   Awake, alert, oriented. No obvious focal deficits  Musculoskeletal:  No obvious deformities    EK21  Normal sinus rhythm  Normal ECG  When compared with ECG of 2021 16:35,  Sinus rhythm has replaced Atrial fibrillation  Confirmed by Abundio Raymundo   Echo: 21   Summary   Technically difficult study due to poor acoustic windows. The left ventricle was not well visualized. Systolic function appears normal.   Ejection fraction is visually estimated at 50-55%. Signature      ----------------------------------------------------------------   Electronically signed by Jean Claude Hudson MD     Diagnosis Orders   1. New onset atrial fibrillation ([address]     2. History of cardioversion     3. SOB (shortness of breath) on exertion  NM MYOCARDIAL SPECT REST EXERCISE OR RX   4. HTN (hypertension), benign  Lipid Panel    NM MYOCARDIAL SPECT REST EXERCISE OR RX   5. Type 2 diabetes mellitus without complication, without long-term current use of insulin (HCC)  Lipid Panel   6. History of tobacco use disorder  Lipid Panel       Orders Placed This Encounter   Procedures    NM MYOCARDIAL SPECT REST EXERCISE OR RX     Standing Status:   Future     Standing Expiration Date:   2022     Order Specific Question:   Reason for Exam?     Answer:   Chest pain     Order Specific Question:   Reason for Exam?     Answer:   EKG abnormalities     Order Specific Question:   Procedure Type     Answer:   Exercise    Lipid Panel     Standing Status:   Future     Standing Expiration Date:   2022     Order Specific Question:   Is Patient Fasting?/# of Hours     Answer:    sigmacare f/u for new onset AFB/AFL s/p cardioversion: denies feeling palpitations or heart racing since discharge. Apical regular today. Some intermittent sob with exertion. Dizziness and lightheadedness sometimes with standing. Denies chest pain.    HTN: reports B/P at home has been 140's / 70-80, H/R 60-70  H/o of DVT, PE on coumadin  On losartan, lopressor Check lipid panel  Stress Test to further evaluate sob-has risk factors for CAD such as obesity, DM, HTN  Dr. Jose Karimi to follow event monitor  Will monitor B/P at home and document and take to upcoming PCP appt. He has been advised to increase his losartan to 50 mg a day if his B/P is consistently 150 or higher at home and to continue monitoring if he does increase his losartan and again follow up with PCP.      Discussed use, benefit, and side effects of prescribed medications. Reports compliant with current medications and no side effects. All patient questions answered. Pt voiced understanding. Instructed to continue current medications, diet and exercise. Continue risk factor modification and medical management. Patient agreed with treatment plan. Follow up as directed.     Continue Dr Jeffrie Dancer current treatment plan  Follow up with Dr Jose Karimi as scheduled or sooner if needed

## 2021-05-13 NOTE — PROCEDURES
[address] [place] [address]                                 EVENT MONITOR    PATIENT NAME: Ron Kumar                      :        1970  MED REC NO:   [id number]                           ROOM:       0025  ACCOUNT NO:   [de-identified]                           ADMIT DATE: 2021  PROVIDER:     Adrian Durant MD    TEST TYPE:  Two-week event monitor. MONITORING PERIOD:  2021 to 2021. INDICATION:  Palpitations, paroxysmal AFib. FINDINGS:  Predominant rhythm is sinus rhythm. Maximum heart rate is  196 beats per minute. Minimum heart rate is 42 beats per minute. 827  PVCs and 7 episodes of NSVT were noted. Longest episode was 5 beats. 2765 paroxysmal supraventricular contractions were noted and 37 episodes  of SVT were noted with 10 beats being the longest.  There were 10  patient-triggered events that were unassociated with rhythm abnormality. No findings of atrial fibrillation or flutter. No high-grade AV block. SUMMARY:  Intermittent SVT and nonsustained VT with PACs and PVCs that  all appeared to be asymptomatic.         Claudette Guild, MD    D: 2021 15:33:56       T: 2021 20:05:24     [email or web address]  Job#: [id number]     Doc#: [id number]    CC:

## 2021-05-18 LAB
CHOLESTEROL, TOTAL: 162 MG/DL
CHOLESTEROL/HDL RATIO: NORMAL
HDLC SERPL-MCNC: 36 MG/DL (ref 35–70)
LDL CHOLESTEROL CALCULATED: 108 MG/DL (ref 0–160)
NONHDLC SERPL-MCNC: NORMAL MG/DL
TRIGL SERPL-MCNC: 86 MG/DL
VLDLC SERPL CALC-MCNC: 17 MG/DL

## 2021-06-03 ENCOUNTER — HOSPITAL ENCOUNTER (OUTPATIENT)
Dept: NON INVASIVE DIAGNOSTICS | Age: 51
Discharge: HOME | End: 2021-06-03
Payer: COMMERCIAL

## 2021-06-03 DIAGNOSIS — I10 HTN (HYPERTENSION), BENIGN: ICD-10-CM

## 2021-06-03 DIAGNOSIS — R06.02 SOB (SHORTNESS OF BREATH) ON EXERTION: ICD-10-CM

## 2021-06-03 PROCEDURE — 93017 CV STRESS TEST TRACING ONLY: CPT | Performed by: INTERNAL MEDICINE

## 2021-06-03 PROCEDURE — 93016 CV STRESS TEST SUPVJ ONLY: CPT | Performed by: INTERNAL MEDICINE

## 2021-06-03 PROCEDURE — 93018 CV STRESS TEST I&R ONLY: CPT | Performed by: INTERNAL MEDICINE

## 2021-06-03 PROCEDURE — 6360000002 HC RX W HCPCS

## 2021-06-03 PROCEDURE — A9500 TC99M SESTAMIBI: HCPCS | Performed by: INTERNAL MEDICINE

## 2021-06-03 PROCEDURE — 3430000000 HC RX DIAGNOSTIC RADIOPHARMACEUTICAL: Performed by: INTERNAL MEDICINE

## 2021-06-03 PROCEDURE — 78452 HT MUSCLE IMAGE SPECT MULT: CPT | Performed by: INTERNAL MEDICINE

## 2021-06-03 PROCEDURE — 78452 HT MUSCLE IMAGE SPECT MULT: CPT

## 2021-06-03 RX ADMIN — Medication 34.9 MILLICURIE: at 14:18

## 2021-06-03 RX ADMIN — Medication 10.5 MILLICURIE: at 12:50

## 2021-08-26 ENCOUNTER — OFFICE VISIT (OUTPATIENT)
Dept: CARDIOLOGY CLINIC | Age: 51
End: 2021-08-26
Payer: COMMERCIAL

## 2021-08-26 VITALS
SYSTOLIC BLOOD PRESSURE: 170 MMHG | HEIGHT: 73 IN | DIASTOLIC BLOOD PRESSURE: 82 MMHG | HEART RATE: 58 BPM | WEIGHT: 315 LBS | BODY MASS INDEX: 41.75 KG/M2

## 2021-08-26 DIAGNOSIS — I48.91 ATRIAL FIBRILLATION, UNSPECIFIED TYPE (HCC): Primary | ICD-10-CM

## 2021-08-26 DIAGNOSIS — Z98.890 HISTORY OF CARDIOVERSION: ICD-10-CM

## 2021-08-26 PROCEDURE — 99213 OFFICE O/P EST LOW 20 MIN: CPT | Performed by: INTERNAL MEDICINE

## 2021-08-26 NOTE — PROGRESS NOTES
[address] [place] [address]  Dept: [phone]  Dept Fax: [phone]  Loc: [phone]    Visit Date: 8/26/2021    Mr. Rico Correa is a 49 y.o. male  who presented for:  Chief Complaint   Patient presents with    3 Month Follow-Up    Results    Check-Up       HPI:   HPI   49 y.o. male presents with hx of bilateral PE on coumadin, GAETANO on CPAP, DM II, hx of smoker who had Afib s/p DCCV 4/2021. He stopped drinking and smoking after recent admission. BP 190s. Taking Coumadin. He knows he needs to lose weight. Taking all meds, no side effects. No chest pain, angina, BALLESTEROS, orthopnea, PND, sob at rest, palpitations, LE edema, or syncope. Current Outpatient Medications:     metoprolol tartrate (LOPRESSOR) 25 MG tablet, Take 1 tablet by mouth 2 times daily, Disp: 60 tablet, Rfl: 3    losartan (COZAAR) 25 MG tablet, Take 25 mg by mouth daily, Disp: , Rfl:     metFORMIN (GLUCOPHAGE) 500 MG tablet, Take 500 mg by mouth 2 times daily (with meals), Disp: , Rfl:     warfarin (COUMADIN) 5 MG tablet, Take 5 mg by mouth 2.5 tablets on MWFSa, 3 tablets SuTTh, Disp: , Rfl:     Past Medical History  Jaun Allen  has a past medical history of Atrial flutter ([address] Blood circulation, collateral, Blood clot in vein, Diabetes mellitus ([address] Disease of blood and blood forming organ, Hypertension, Other disorders of kidney and ureter in diseases classified elsewhere, Pneumonia, and Pulmonary emboli ([address] Social History  Jaun Allen  reports that he quit smoking about 6 months ago. He has a 22.50 pack-year smoking history. He has never used smokeless tobacco. He reports previous alcohol use. He reports that he does not use drugs. Family History  Jaun Allen family history is not on file. There is no family history of bicuspid aortic valve, aneurysms, heart transplant, pacemakers, defibrillators, or sudden cardiac death.       Past Surgical History   History reviewed. No pertinent surgical history. Review of Systems   Constitutional: Negative for chills and fever  HENT: Negative for congestion, sinus pressure, sneezing and sore throat. Eyes: Negative for pain, discharge, redness and itching. Respiratory: Negative for apnea, cough  Gastrointestinal: Negative for blood in stool, constipation, diarrhea   Endocrine: Negative for cold intolerance, heat intolerance, polydipsia. Genitourinary: Negative for dysuria, enuresis, flank pain and hematuria. Musculoskeletal: Negative for arthralgias, joint swelling and neck pain. Neurological: Negative for numbness and headaches. Psychiatric/Behavioral: Negative for agitation, confusion, decreased concentration and dysphoric mood. Objective:     BP (!) 190/83   Pulse 58   Ht 6' 1\" (1.854 m)   Wt (!) 364 lb 3.2 oz (165.2 kg)   BMI 48.05 kg/m²     Wt Readings from Last 3 Encounters:   08/26/21 (!) 364 lb 3.2 oz (165.2 kg)   05/11/21 (!) 334 lb (151.5 kg)   04/17/21 (!) 334 lb 9.6 oz (151.8 kg)     BP Readings from Last 3 Encounters:   08/26/21 (!) 190/83   05/11/21 (!) 144/82   04/19/21 (!) 119/59       Nursing note and vitals reviewed. Physical Exam   Constitutional: Oriented to person, place, and time. Appears well-developed and well-nourished. HENT:   Head: Normocephalic and atraumatic. Eyes: EOM are normal. Pupils are equal, round, and reactive to light. Neck: Normal range of motion. Neck supple. No JVD present. Cardiovascular: Normal rate, regular rhythm, normal heart sounds and intact distal pulses. No murmur heard. Pulmonary/Chest: Effort normal and breath sounds normal. No respiratory distress. No wheezes. No rales. Abdominal: Soft. Bowel sounds are normal. No distension. There is no tenderness. Musculoskeletal: Normal range of motion. No edema. Neurological: Alert and oriented to person, place, and time. No cranial nerve deficit.  Coordination normal.   Skin: Skin is warm and dry. Psychiatric: Normal mood and affect.        No results found for: CKTOTAL, CKMB, CKMBINDEX    Lab Results   Component Value Date    WBC 8.5 04/18/2021    RBC 5.23 04/18/2021    HGB 16.0 04/18/2021    HCT 49.6 04/18/2021    MCV 94.8 04/18/2021    MCH 30.6 04/18/2021    MCHC 32.3 04/18/2021    RDW 15.3 04/17/2021     04/18/2021    MPV 9.1 04/18/2021       Lab Results   Component Value Date     04/19/2021    K 4.8 04/19/2021     04/19/2021    CO2 29 04/19/2021    BUN 16 04/19/2021    LABALBU 3.4 04/17/2021    CREATININE 0.7 04/19/2021    CALCIUM 8.9 04/19/2021    LABGLOM >90 04/19/2021    GLUCOSE 101 04/19/2021       Lab Results   Component Value Date    ALKPHOS 90 04/17/2021    ALT 21 04/17/2021    AST 15 04/17/2021    PROT 7.8 04/17/2021    BILITOT 0.6 04/17/2021    LABALBU 3.4 04/17/2021       Lab Results   Component Value Date    MG 2.1 04/19/2021       Lab Results   Component Value Date    INR 1.88 (H) 04/19/2021    INR 2.10 (H) 04/18/2021    INR 2.12 (H) 04/17/2021         Lab Results   Component Value Date    LABA1C 5.7 04/17/2021       Lab Results   Component Value Date    TRIG 86 05/18/2021    HDL 36 05/18/2021    LDLCALC 108 05/18/2021       Lab Results   Component Value Date    TSH 1.790 04/17/2021         Testing Reviewed:      I have individually reviewed the cardiac test below:    ECHO: Results for orders placed during the hospital encounter of 04/17/21    Echocardiogram 2D/ M-Mode/ Colorflow/ Do    Narrative  Transthoracic Echocardiography Report (TTE)    Demographics    Patient Name     Jamarcus Trinidad Gender                Male    MR #             [id number]     Race                      Ethnicity    Account #        [de-identified]     Room Number           0025    Accession Number [id number]    Date of Study         04/19/2021    Date of Birth    1970    Referring Physician   Yancy Goldberg MD Ramsey Papa    Age              53 year(s)    Sonographer Alex Marcial, [facility]    Interpreting          Clarissa Juan MD  Physician    Procedure    Type of Study    TTE procedure:ECHOCARDIOGRAM COMPLETE 2D W DOPPLER W COLOR. Procedure Date  Date: 04/19/2021 Start: 09:03 AM    Study Location: Bedside  Technical Quality: Poor visualization due to body habitus. Indications:Paroxysmal atrial fibrillation. Additional Medical History:Palpitations, PAF, Former smoker, Hypertension,  Diabetes, Pulmonary embolism    Patient Status: Routine    Height: 73 inches Weight: 334.01 pounds BSA: 2.68 m^2 BMI: 44.07 kg/m^2    BP: 143/87 mmHg    Conclusions    Summary  Technically difficult study due to poor acoustic windows. The left ventricle was not well visualized. Systolic function appears normal.  Ejection fraction is visually estimated at 50-55%. Signature    ----------------------------------------------------------------  Electronically signed by Clarissa Juan MD (Interpreting  physician) on 04/19/2021 at 12:16 PM  ----------------------------------------------------------------    Findings    Mitral Valve  The mitral valve was not well visualized . DOPPLER: The transmitral velocity was within the normal range with no  evidence for mitral stenosis. There was no evidence of mitral  regurgitation. Aortic Valve  The aortic valve leaflets were not well visualized. DOPPLER: Transaortic velocity was within the normal range with no evidence  of aortic stenosis. There was no evidence of aortic regurgitation. Tricuspid Valve  Tricuspid valve was not well visualized. DOPPLER: There is no evidence of tricuspid stenosis. There was no evidence  of tricuspid regurgitation. Pulmonic Valve  The pulmonic valve was not well visualized . Left Atrium  Left atrial size is normal.    Left Ventricle  Extremely difficult study. The left ventricle was not well visualized. Systolic function appears normal.  Ejection fraction is visually estimated at 50-55%.     Right Atrium  Right atrial size was normal.    Right Ventricle  The right ventricle was not clearly visualized . Pericardial Effusion  The pericardium appears normal with no evidence of a pericardial effusion. Pleural Effusion  No evidence of pleural effusion. Aorta / Great Vessels  -Aortic root dimension within normal limits. -IVC not visualized    M-Mode/2D Measurements & Calculations    LV Diastolic    LV Systolic Dimension: 3.8  AV Cusp Separation: 1.8 cmLA  Dimension: 5.6  cm                          Dimension: 3.5 cmAO Root  cm              LV Volume Diastolic: 461 ml Dimension: 3.7 cm  LV FS:32.1 %    LV Volume Systolic: 62 ml  LV PW           LV EDV/LV EDV Index: 370  Diastolic: 1.1  UX/90 E^9BC ESV/LV ESV  cm              Index: 62 ml/23 m^2         RV Diastolic Dimension: 2.4 cm  Septum          EF Calculated: 16.3 %  Diastolic: 1 cm                             LA/Aorta: 0.95    Doppler Measurements & Calculations    MV Peak E-Wave: 85.7 cm/s  AV Peak Velocity: 146  LVOT Peak Velocity: 101  MV Peak A-Wave: 69.4 cm/s  cm/s                   cm/s  MV E/A Ratio: 1.23         AV Peak Gradient: 8.53 LVOT Peak Gradient: 4  MV Peak Gradient: 2.94     mmHg                   mmHg  mmHg    MV Deceleration Time: 190  msec                                              TR Velocity:162 cm/s  MV P1/2t: 56 msec                                 TR Gradient:10.5 mmHg  MVA by PHT:3.93 cm^2                              PV Peak Velocity: 92.5  cm/s  MV E' Septal Velocity: 6.6 AV DVI (Vmax):0.69     PV Peak Gradient: 3.42  cm/s                                              mmHg  MV A' Septal Velocity: 9.7  cm/s  MV E' Lateral Velocity:  13.6 cm/s  MV A' Lateral Velocity:  9.6 cm/s  E/E' septal: 12.98  E/E' lateral: 6.3    [email or web address]  [id number]%3d%3d       Assessment/Plan   Atrial Fibrillation RVR s/p DCCV, 4/2021  Atrial Flutter  - on initial ECG  Bilateral PE on Coumadin chronically  GAETANO on CPAP  DM II  HTN  Pauses 2.8s - likely related to the fact that we have given multiple SN and AVN blockers  He is on BB/ARB/Coumadin. No smoking or drinking. No recurrence. He will work on weight. Coumadin without bleeding. Re-check BP - he is very nervous.   Discussed diet/exercise/BP/weight loss/health lifestyle choices/lipids; the patient understands the goals and will try to comply.       Disposition:  1 year           Electronically signed by Sujey Glass MD   8/26/2021 at 2:35 PM EDT

## 2022-06-08 DIAGNOSIS — Z86.711 PERSONAL HISTORY OF PULMONARY EMBOLISM: Primary | ICD-10-CM

## 2022-06-08 NOTE — TELEPHONE ENCOUNTER
Patient is needing INR rechecked. Patient will get INR checked at [facility]. Needs order.

## 2022-06-08 NOTE — TELEPHONE ENCOUNTER
I placed standing order for PT/INR.   Patient is able to have this done at [facility]

## 2022-06-08 NOTE — TELEPHONE ENCOUNTER
Called patient- no answer- left message for patient to return call-     What vaccine or shot is he inquiring about?

## 2022-06-08 NOTE — TELEPHONE ENCOUNTER
----- Message from Severiano Save22 sent at 6/8/2022  9:59 AM EDT -----  Subject: Medication Problem    QUESTIONS  Name of Medication? metoprolol tartrate (LOPRESSOR) 25 MG tablet  Patient-reported dosage and instructions? Take 1 tablet by mouth 2 times   daily  What question or problem do you have with the medication? Pt would like   his medication to say [facility], it currently says that Glenda SOTO is   refilling this prescription and he needs a call back  Preferred Pharmacy? [address], [address] [facility] [phone]  Pharmacy phone number (if available)? [phone]  Additional Information for Provider? Pt also asked for when his next shot   is, and where he needed to go, I did not have an appointment scheduled on   file so I had no information to give him.   ---------------------------------------------------------------------------  --------------  CALL BACK INFO  What is the best way for the office to contact you? OK to leave message on   voicemail  Preferred Call Back Phone Number? [phone]  ---------------------------------------------------------------------------  --------------  SCRIPT ANSWERS  Relationship to Patient?  Self

## 2022-06-20 ENCOUNTER — HOSPITAL ENCOUNTER (OUTPATIENT)
Age: 52
Discharge: HOME | End: 2022-06-20
Payer: COMMERCIAL

## 2022-06-20 DIAGNOSIS — Z86.711 PERSONAL HISTORY OF PULMONARY EMBOLISM: ICD-10-CM

## 2022-06-20 LAB — INR BLD: 2.69 (ref 0.85–1.13)

## 2022-06-20 PROCEDURE — 85610 PROTHROMBIN TIME: CPT

## 2022-06-20 PROCEDURE — 36415 COLL VENOUS BLD VENIPUNCTURE: CPT

## 2022-06-22 ENCOUNTER — TELEPHONE (OUTPATIENT)
Dept: FAMILY MEDICINE CLINIC | Age: 52
End: 2022-06-22

## 2022-06-22 NOTE — TELEPHONE ENCOUNTER
----- Message from Pal Michaud MD sent at 6/21/2022 12:42 PM EDT -----  INR in goal range- keep same coumadin dose and gerson INR 1 month

## 2022-07-30 ENCOUNTER — HOSPITAL ENCOUNTER (OUTPATIENT)
Age: 52
Discharge: HOME | End: 2022-07-30
Payer: COMMERCIAL

## 2022-07-30 DIAGNOSIS — Z86.711 PERSONAL HISTORY OF PULMONARY EMBOLISM: ICD-10-CM

## 2022-07-30 LAB — INR BLD: 1.44 (ref 0.85–1.13)

## 2022-07-30 PROCEDURE — 36415 COLL VENOUS BLD VENIPUNCTURE: CPT

## 2022-07-30 PROCEDURE — 85610 PROTHROMBIN TIME: CPT

## 2022-08-01 ENCOUNTER — TELEPHONE (OUTPATIENT)
Dept: FAMILY MEDICINE CLINIC | Age: 52
End: 2022-08-01

## 2022-08-01 NOTE — TELEPHONE ENCOUNTER
----- Message from Alejandro Frye MD sent at 8/1/2022  8:08 AM EDT -----  Please check in with Arben Nunez regarding how he is taking his warfarin- still 5 mg daily? If yes will need slight dose adjustment (I will calculate) to increase INR into goal range.

## 2022-08-02 ENCOUNTER — TELEPHONE (OUTPATIENT)
Dept: FAMILY MEDICINE CLINIC | Age: 52
End: 2022-08-02

## 2022-08-02 NOTE — TELEPHONE ENCOUNTER
Have Louann Landau take 7.5 mg Tuesday/Thursday this week (1.5 tabs) and 5 mg all other days.   Please have INR drawn again next Tuesday

## 2022-08-02 NOTE — TELEPHONE ENCOUNTER
Patient called in because he will be out of town for the 2 week on a Tuesday INR recheck. Wanting to know if he can go on Sunday prior to Tuesday to get his lab drawn.

## 2022-08-07 ENCOUNTER — HOSPITAL ENCOUNTER (OUTPATIENT)
Age: 52
Discharge: HOME | End: 2022-08-07
Payer: COMMERCIAL

## 2022-08-07 DIAGNOSIS — Z86.711 PERSONAL HISTORY OF PULMONARY EMBOLISM: ICD-10-CM

## 2022-08-07 LAB — INR BLD: 1.86 (ref 0.85–1.13)

## 2022-08-07 PROCEDURE — 36415 COLL VENOUS BLD VENIPUNCTURE: CPT

## 2022-08-07 PROCEDURE — 85610 PROTHROMBIN TIME: CPT

## 2022-08-14 ENCOUNTER — HOSPITAL ENCOUNTER (OUTPATIENT)
Age: 52
Discharge: HOME | End: 2022-08-14
Payer: COMMERCIAL

## 2022-08-14 DIAGNOSIS — Z86.711 PERSONAL HISTORY OF PULMONARY EMBOLISM: ICD-10-CM

## 2022-08-14 LAB — INR BLD: 1.63 (ref 0.85–1.13)

## 2022-08-14 PROCEDURE — 36415 COLL VENOUS BLD VENIPUNCTURE: CPT

## 2022-08-14 PROCEDURE — 85610 PROTHROMBIN TIME: CPT

## 2022-08-15 RX ORDER — VALSARTAN 80 MG/1
80 TABLET ORAL DAILY
Qty: 30 TABLET | Refills: 11 | Status: SHIPPED | OUTPATIENT
Start: 2022-08-15

## 2022-08-15 RX ORDER — VALSARTAN 80 MG/1
80 TABLET ORAL DAILY
COMMUNITY
Start: 2022-08-14 | End: 2022-08-15 | Stop reason: SDUPTHER

## 2022-08-15 RX ORDER — WARFARIN SODIUM 5 MG/1
5 TABLET ORAL DAILY
Qty: 270 TABLET | Refills: 3 | Status: SHIPPED | OUTPATIENT
Start: 2022-08-15

## 2022-08-15 NOTE — TELEPHONE ENCOUNTER
Pt called and is requesting refills on Warfarin 5mg and Valsartan 80mg to be sent to [facility]. Pt is out of meds. Pt did make appt for 8/30/22 for medication follow up.

## 2022-08-21 ENCOUNTER — HOSPITAL ENCOUNTER (OUTPATIENT)
Age: 52
Discharge: HOME | End: 2022-08-21
Payer: COMMERCIAL

## 2022-08-21 DIAGNOSIS — Z86.711 PERSONAL HISTORY OF PULMONARY EMBOLISM: ICD-10-CM

## 2022-08-21 LAB — INR BLD: 2.21 (ref 0.85–1.13)

## 2022-08-21 PROCEDURE — 85610 PROTHROMBIN TIME: CPT

## 2022-08-21 PROCEDURE — 36415 COLL VENOUS BLD VENIPUNCTURE: CPT

## 2022-08-28 ENCOUNTER — HOSPITAL ENCOUNTER (OUTPATIENT)
Age: 52
Discharge: HOME | End: 2022-08-28
Payer: COMMERCIAL

## 2022-08-28 DIAGNOSIS — Z86.711 PERSONAL HISTORY OF PULMONARY EMBOLISM: ICD-10-CM

## 2022-08-28 LAB — INR BLD: 2.44 (ref 0.85–1.13)

## 2022-08-28 PROCEDURE — 36415 COLL VENOUS BLD VENIPUNCTURE: CPT

## 2022-08-28 PROCEDURE — 85610 PROTHROMBIN TIME: CPT

## 2022-08-30 ENCOUNTER — OFFICE VISIT (OUTPATIENT)
Dept: FAMILY MEDICINE CLINIC | Age: 52
End: 2022-08-30
Payer: COMMERCIAL

## 2022-08-30 VITALS
SYSTOLIC BLOOD PRESSURE: 126 MMHG | BODY MASS INDEX: 41.75 KG/M2 | HEIGHT: 73 IN | DIASTOLIC BLOOD PRESSURE: 66 MMHG | HEART RATE: 62 BPM | OXYGEN SATURATION: 96 % | WEIGHT: 315 LBS

## 2022-08-30 DIAGNOSIS — G47.33 OSA (OBSTRUCTIVE SLEEP APNEA): ICD-10-CM

## 2022-08-30 DIAGNOSIS — F41.1 GENERALIZED ANXIETY DISORDER: ICD-10-CM

## 2022-08-30 DIAGNOSIS — I10 PRIMARY HYPERTENSION: ICD-10-CM

## 2022-08-30 DIAGNOSIS — I48.0 PAROXYSMAL ATRIAL FIBRILLATION (HCC): ICD-10-CM

## 2022-08-30 DIAGNOSIS — Z12.11 SCREENING FOR COLON CANCER: ICD-10-CM

## 2022-08-30 DIAGNOSIS — E11.9 TYPE 2 DIABETES MELLITUS WITHOUT COMPLICATION, WITHOUT LONG-TERM CURRENT USE OF INSULIN (HCC): Primary | ICD-10-CM

## 2022-08-30 DIAGNOSIS — E66.01 CLASS 3 SEVERE OBESITY DUE TO EXCESS CALORIES WITH SERIOUS COMORBIDITY AND BODY MASS INDEX (BMI) OF 45.0 TO 49.9 IN ADULT (HCC): ICD-10-CM

## 2022-08-30 PROBLEM — E66.813 CLASS 3 SEVERE OBESITY DUE TO EXCESS CALORIES WITH SERIOUS COMORBIDITY AND BODY MASS INDEX (BMI) OF 45.0 TO 49.9 IN ADULT: Status: ACTIVE | Noted: 2022-08-30

## 2022-08-30 LAB — HBA1C MFR BLD: 6 %

## 2022-08-30 PROCEDURE — 83036 HEMOGLOBIN GLYCOSYLATED A1C: CPT | Performed by: FAMILY MEDICINE

## 2022-08-30 PROCEDURE — 3044F HG A1C LEVEL LT 7.0%: CPT | Performed by: FAMILY MEDICINE

## 2022-08-30 PROCEDURE — 99214 OFFICE O/P EST MOD 30 MIN: CPT | Performed by: FAMILY MEDICINE

## 2022-08-30 RX ORDER — ESCITALOPRAM OXALATE 20 MG/1
1 TABLET ORAL DAILY
COMMUNITY
Start: 2022-08-06 | End: 2022-10-03 | Stop reason: SDUPTHER

## 2022-08-30 RX ORDER — TRIAMCINOLONE ACETONIDE 1 MG/G
CREAM TOPICAL
Qty: 45 G | Refills: 1 | Status: SHIPPED | OUTPATIENT
Start: 2022-08-30

## 2022-08-30 RX ORDER — ALBUTEROL SULFATE 90 UG/1
2 AEROSOL, METERED RESPIRATORY (INHALATION) EVERY 6 HOURS PRN
COMMUNITY

## 2022-08-30 RX ORDER — FLUTICASONE PROPIONATE AND SALMETEROL 250; 50 UG/1; UG/1
1 POWDER RESPIRATORY (INHALATION) 2 TIMES DAILY
COMMUNITY
Start: 2022-08-20

## 2022-08-30 RX ORDER — SIMVASTATIN 20 MG
1 TABLET ORAL DAILY
COMMUNITY
Start: 2022-08-06

## 2022-08-30 SDOH — ECONOMIC STABILITY: FOOD INSECURITY: WITHIN THE PAST 12 MONTHS, YOU WORRIED THAT YOUR FOOD WOULD RUN OUT BEFORE YOU GOT MONEY TO BUY MORE.: NEVER TRUE

## 2022-08-30 SDOH — ECONOMIC STABILITY: FOOD INSECURITY: WITHIN THE PAST 12 MONTHS, THE FOOD YOU BOUGHT JUST DIDN'T LAST AND YOU DIDN'T HAVE MONEY TO GET MORE.: NEVER TRUE

## 2022-08-30 ASSESSMENT — ENCOUNTER SYMPTOMS
ABDOMINAL PAIN: 0
DIARRHEA: 0
SHORTNESS OF BREATH: 0
SORE THROAT: 0
CONSTIPATION: 0
TROUBLE SWALLOWING: 0
VOMITING: 0
COUGH: 0

## 2022-08-30 ASSESSMENT — PATIENT HEALTH QUESTIONNAIRE - PHQ9
SUM OF ALL RESPONSES TO PHQ QUESTIONS 1-9: 0
SUM OF ALL RESPONSES TO PHQ QUESTIONS 1-9: 0
2. FEELING DOWN, DEPRESSED OR HOPELESS: 0
SUM OF ALL RESPONSES TO PHQ QUESTIONS 1-9: 0
SUM OF ALL RESPONSES TO PHQ QUESTIONS 1-9: 0
SUM OF ALL RESPONSES TO PHQ9 QUESTIONS 1 & 2: 0
1. LITTLE INTEREST OR PLEASURE IN DOING THINGS: 0

## 2022-08-30 ASSESSMENT — SOCIAL DETERMINANTS OF HEALTH (SDOH): HOW HARD IS IT FOR YOU TO PAY FOR THE VERY BASICS LIKE FOOD, HOUSING, MEDICAL CARE, AND HEATING?: NOT HARD AT ALL

## 2022-08-30 NOTE — PROGRESS NOTES
Outpatient Progress Notes       Date of Service:   9 / 20 / 2018                                                 Length of session :  25 minutes    Patient is a 15 year old female who was accompanied by her  mother  Patient was seen for follow-up regarding ADHD, behavior problems and anxiety  Patient was seen for medication management.     Patient was initially seen alone  followed by joint session   with her Mother . She was last seen in July   Patient  Reports some new stress of moving to a new house and new school, but seems to be managing well with the changes  With regards to ADHD --  ADHD symptoms are manageable current medication   With regards to depression --patient denies most depressive symptoms .     Felt  85 % , in improvement despite some setbacks due to stress of new school  And  a new House  With regards to anxiety -- denies any persistent anxiety.   Update in school -- currently in  9th grade , at  [employer] , adjusted well to high school   Update at home -- was grounded lately,, for not cleaning her room   Side effects:  None reported  Denies any headache, dizziness, chest pain or palpitation, GI (gastrointestinal) upset, nausea or vomiting.  Patient reports some sleep disturbance. With weird dreams, nightmares and night terrors    Appetite is okay  on   Vyvanse         Mental Status Examination:   Patient appears as stated  She was tall and skinny .  Adequately groomed and casually dressed.  She was  not in any active distress. She was pleasant , polite and cooperative  Alert, oriented to self, place and time.  Speech:  Normal rate, tone, articulate,   Gait:  Normal.  No involuntary movement or tics observed.  Eye Contact:  Maintained.  Attention Span:   Good   Mood:  Euthymic  Affect:  Full, and mood congruent,  Thought Process:  Linear, and  goal directed  Thought Content:  Denies perceptual disturbances.   [address] [place] [phone]  Dept: [address] Aurelia Woods is a 47 y.o.male    Pt presents for follow up medications. Pt feeling ok since last visit- interval history and any new issues noted below:       Patient Active Problem List   Diagnosis    PSVT (paroxysmal supraventricular tachycardia) (HCC)    Hypertension    GAETANO (obstructive sleep apnea)    Type 2 diabetes mellitus without complication, without long-term current use of insulin (HCC)    History of tobacco use disorder    Macrocytosis without anemia    Personal history of pulmonary embolism    Anticoagulated on Coumadin    Paroxysmal atrial fibrillation (HCC)    Class 3 severe obesity due to excess calories with serious comorbidity and body mass index (BMI) of 45.0 to 49.9 in adult [facility])    Generalized anxiety disorder       Current Outpatient Medications   Medication Sig Dispense Refill    escitalopram (LEXAPRO) 20 MG tablet Take 1 tablet by mouth daily      fluticasone-salmeterol (ADVAIR) 250-50 MCG/ACT AEPB diskus inhaler Inhale 1 puff into the lungs daily      simvastatin (ZOCOR) 20 MG tablet Take 1 tablet by mouth daily      valsartan (DIOVAN) 80 MG tablet Take 1 tablet by mouth in the morning. 30 tablet 11    warfarin (COUMADIN) 5 MG tablet Take 1 tablet by mouth in the morning. 2.5 tablets daily. 270 tablet 3    metoprolol tartrate (LOPRESSOR) 25 MG tablet Take 1 tablet by mouth 2 times daily 60 tablet 3    losartan (COZAAR) 25 MG tablet Take 25 mg by mouth daily      metFORMIN (GLUCOPHAGE) 500 MG tablet Take 500 mg by mouth 2 times daily (with meals)       No current facility-administered medications for this visit. Review of Systems   Constitutional:  Negative for appetite change, fatigue, fever and unexpected weight change. HENT:  Negative for congestion, ear pain, sore throat and trouble swallowing. Eyes:  Negative for visual disturbance.    Respiratory: Denies SI (suicidal ideation) / HI (homicidal ideation).  No death wish.  Insight and Judgment:  Good   Memory:  Intact for immediate, recent and remote memory.        Diagnosis:  , ADHD  , anxiety disorder  unspecified     1. Length of session is 25 minutes , 50% of which consist of counseling and Acknowledge Mother in  Providing       Structure , guidance and supportive enviroment at home   2. Acknowledge patient's  Progress and provided positive feedback regarding patient improvement in school  3. Recommend  to followup with individual and family therapy.    4. Continue  Vyvanse 20 mg per day -- for ADHD      . Increase  Remeron 15 mg per day --for anxiety and depression  5. Followup in -3 -4 months  Or sooner if condition deteriorates         range of motion. Cervical back: Normal range of motion. No tenderness. Right lower leg: No edema. Left lower leg: No edema. Lymphadenopathy:      Cervical: No cervical adenopathy. Skin:     General: Skin is warm. Findings: No rash. Comments: Annular mildly erythematous scaling patch mid shin R LE  Approx 4 x 3 cm oblong   Neurological:      General: No focal deficit present. Mental Status: He is alert. Psychiatric:         Mood and Affect: Mood normal.     Diabetic foot check: Normal strength and range of motion of toes, feet, and ankles bilaterally. No joint deformity. No cyanosis or clubbing. 100% sensation at all 22 test points with the 10 gram filament. Dorsalis pedis pulses intact bilaterally. Capillary refill at the toes was less than 2 seconds. Light touch sensation intact bilaterally. Hair growth present on feet and toes bilaterally. No skin breakdown, erythema, rub spots, blisters, scaling, or ulcers. No calluses or corns. Toenails thin and not ingrown. No evidence of fungal infection. No results found for this visit on 08/30/22.     Lab Results   Component Value Date    LABA1C 5.7 04/17/2021       Lab Results   Component Value Date    CHOL 162 05/18/2021    TRIG 86 05/18/2021    HDL 36 05/18/2021    LDLCALC 108 05/18/2021       The 10-year ASCVD risk score (Eduardo Logan et al., 2013) is: 8.6%    Values used to calculate the score:      Age: 47 years      Sex: Male      Is Non- : No      Diabetic: Yes      Tobacco smoker: No      Systolic Blood Pressure: 466 mmHg      Is BP treated: Yes      HDL Cholesterol: 36 mg/dL      Total Cholesterol: 162 mg/dL    Lab Results   Component Value Date     04/19/2021    K 4.8 04/19/2021     04/19/2021    CO2 29 04/19/2021    BUN 16 04/19/2021    CREATININE 0.7 04/19/2021    GLUCOSE 101 04/19/2021    CALCIUM 8.9 04/19/2021    PROT 7.8 04/17/2021    LABALBU 3.4 04/17/2021    BILITOT 0.6 04/17/2021 ALKPHOS 90 04/17/2021    AST 15 04/17/2021    ALT 21 04/17/2021    LABGLOM >90 04/19/2021     CrCl cannot be calculated (Patient's most recent lab result is older than the maximum 180 days allowed. ). No results found for: Cl Krishnan    Lab Results   Component Value Date    TSH 1.790 04/17/2021       Lab Results   Component Value Date    WBC 8.5 04/18/2021    HGB 16.0 04/18/2021    HCT 49.6 04/18/2021    MCV 94.8 (H) 04/18/2021     04/18/2021       No results found for: PSA, PSADIA    Immunization History   Administered Date(s) Administered    Influenza, FLUARIX, FLULAVAL, (age 10 mo+) AND AFLURIA, FLUZONE (age 1 y+), PF 09/24/2021    Pneumococcal conjugate PCV20, PF (Prevnar 20) 04/08/2022    Tdap (Boostrix, Adacel) 04/08/2022    Zoster Recombinant (Shingrix) 04/08/2022, 07/23/2022       Health Maintenance   Topic Date Due    COVID-19 Vaccine (1) Never done    Diabetic foot exam  Never done    Depression Screen  Never done    HIV screen  Never done    Diabetic microalbuminuria test  Never done    Diabetic retinal exam  Never done    Hepatitis C screen  Never done    Hepatitis B vaccine (1 of 3 - Risk 3-dose series) Never done    Colorectal Cancer Screen  Never done    Low dose CT lung screening  Never done    A1C test (Diabetic or Prediabetic)  04/17/2022    Lipids  05/18/2022    Flu vaccine (1) 09/01/2022    DTaP/Tdap/Td vaccine (2 - Td or Tdap) 04/08/2032    Shingles vaccine  Completed    Pneumococcal 0-64 years Vaccine  Completed    Hepatitis A vaccine  Aged Out    Hib vaccine  Aged Out    Meningococcal (ACWY) vaccine  Aged Out       Future Appointments   Date Time Provider Yassine Hdz   9/8/2022  1:00 PM LINDEN Gerber [facility] [[email or web address] - [place] [email or web address]         ASSESSMENT       Diagnosis Orders   1. Type 2 diabetes mellitus without complication, without long-term current use of insulin ([address]        2. GAETANO (obstructive sleep apnea)        3. Paroxysmal atrial fibrillation (HCC)        4. Primary hypertension        5. Class 3 severe obesity due to excess calories with serious comorbidity and body mass index (BMI) of 45.0 to 49.9 in adult ([address]        6. Generalized anxiety disorder            PLAN      1. Type 2 diabetes mellitus without complication, without long-term current use of insulin (HCC)  POC A1 today in office 6%. Repeat lipids, microalbumin and CMP with next INR. Foot exam today in office  On Statin, ARB  Needs covid booster and flu shot this fall    2. GAETANO (obstructive sleep apnea)  Using CPAP at HS    3. Paroxysmal atrial fibrillation (HCC)  Remains on warfarin for this and personal hx of massive PE during hospital admission for covid. 4. Primary hypertension  At goal on valsartan 80 mg daily and lopressor 25 BID. Weight loss advised  Salt reduction advised    5. Class 3 severe obesity due to excess calories with serious comorbidity and body mass index (BMI) of 45.0 to 49.9 in adult [facility])  Continue attempt at lifestyle modification for weight loss  May consider addition of ozempic for future glycemic control and additional weight loss benefit. Working at diet changes and goal to lose another 20 lbs this fall    6. Generalized anxiety disorder  Stable on lexapro 20 mg daily  Back to work full time and doing well     7. Stasis dermatitis- RLE- topical triamcinolone, compression. Bx patch if not improving. Vaccinations UTD- flu and second covid booster recommended this fall  Referral for screening colonoscopy placed today.     Sandro Bell MD  8:47 AM  8/30/22

## 2022-09-07 NOTE — PROGRESS NOTES
3    metoprolol tartrate (LOPRESSOR) 25 MG tablet Take 1 tablet by mouth 2 times daily 60 tablet 3    metFORMIN (GLUCOPHAGE) 500 MG tablet Take 500 mg by mouth 2 times daily (with meals)       No current facility-administered medications for this visit. Social History     Socioeconomic History    Marital status: Single     Spouse name: None    Number of children: None    Years of education: None    Highest education level: None   Tobacco Use    Smoking status: Former     Packs/day: 1.50     Years: 15.00     Pack years: 22.50     Types: Cigarettes     Quit date: 2021     Years since quittin.6    Smokeless tobacco: Never   Vaping Use    Vaping Use: Never used   Substance and Sexual Activity    Alcohol use: Not Currently    Drug use: Never    Sexual activity: Yes     Social Determinants of Health     Financial Resource Strain: Low Risk     Difficulty of Paying Living Expenses: Not hard at all   Food Insecurity: No Food Insecurity    Worried About Running Out of Food in the Last Year: Never true    Ran Out of Food in the Last Year: Never true       No family history on file. Blood pressure 114/63, height 6' 1\" (1.854 m), weight (!) 347 lb 12.8 oz (157.8 kg). General:   Well developed, well nourished  Lungs:   Clear to auscultation, no rales  Heart:    RRR, Normal S1 S2, No murmur, rubs, or gallops  Abdomen:   Soft, non tender, no organomegalies, positive bowel sounds  Extremities:   No edema, no cyanosis, good peripheral pulses  Neurological:   Awake, alert, oriented. No obvious focal deficits  Musculoskeletal:  No obvious deformities    EKG:          Diagnosis Orders   1. Paroxysmal atrial fibrillation (HCC)  EKG 12 lead      2. Primary hypertension  EKG 12 lead          Orders Placed This Encounter   Procedures    EKG 12 lead     Order Specific Question:   Reason for Exam?     Answer: Other         Assessment/Plan:   PAF- SR today on EKG, on coumadin, taking lopressor 25 mg BID.    HTN-well controlled. On diovan and lopressor wihtout issue. Leg edema- very mild swelling, he does not notice it much, is watching his weight, trying to lose, no weight gain. Will monitor for worsening or weight gain. Disposition:   Follow up with Dr Curtis Burgess in 1 year.

## 2022-09-08 ENCOUNTER — OFFICE VISIT (OUTPATIENT)
Dept: CARDIOLOGY CLINIC | Age: 52
End: 2022-09-08
Payer: COMMERCIAL

## 2022-09-08 VITALS
WEIGHT: 315 LBS | HEIGHT: 73 IN | BODY MASS INDEX: 41.75 KG/M2 | HEART RATE: 62 BPM | DIASTOLIC BLOOD PRESSURE: 63 MMHG | SYSTOLIC BLOOD PRESSURE: 114 MMHG

## 2022-09-08 DIAGNOSIS — I48.0 PAROXYSMAL ATRIAL FIBRILLATION (HCC): Primary | ICD-10-CM

## 2022-09-08 DIAGNOSIS — I10 PRIMARY HYPERTENSION: ICD-10-CM

## 2022-09-08 PROCEDURE — 99213 OFFICE O/P EST LOW 20 MIN: CPT | Performed by: STUDENT IN AN ORGANIZED HEALTH CARE EDUCATION/TRAINING PROGRAM

## 2022-09-08 PROCEDURE — 93000 ELECTROCARDIOGRAM COMPLETE: CPT | Performed by: STUDENT IN AN ORGANIZED HEALTH CARE EDUCATION/TRAINING PROGRAM

## 2022-09-18 ENCOUNTER — HOSPITAL ENCOUNTER (OUTPATIENT)
Age: 52
Discharge: HOME | End: 2022-09-18
Payer: COMMERCIAL

## 2022-09-18 DIAGNOSIS — Z86.711 PERSONAL HISTORY OF PULMONARY EMBOLISM: ICD-10-CM

## 2022-09-18 LAB — INR BLD: 3.73 (ref 0.85–1.13)

## 2022-09-18 PROCEDURE — 85610 PROTHROMBIN TIME: CPT

## 2022-09-18 PROCEDURE — 36415 COLL VENOUS BLD VENIPUNCTURE: CPT

## 2022-09-19 ENCOUNTER — NURSE ONLY (OUTPATIENT)
Dept: LAB | Age: 52
End: 2022-09-19

## 2022-09-19 DIAGNOSIS — E11.9 TYPE 2 DIABETES MELLITUS WITHOUT COMPLICATION, WITHOUT LONG-TERM CURRENT USE OF INSULIN (HCC): ICD-10-CM

## 2022-09-19 DIAGNOSIS — F41.1 GENERALIZED ANXIETY DISORDER: ICD-10-CM

## 2022-09-19 DIAGNOSIS — I10 PRIMARY HYPERTENSION: ICD-10-CM

## 2022-09-19 LAB
ALBUMIN SERPL-MCNC: 4.3 G/DL (ref 3.5–5.1)
ALP BLD-CCNC: 110 U/L (ref 38–126)
ALT SERPL-CCNC: 29 U/L (ref 11–66)
ANION GAP SERPL CALCULATED.3IONS-SCNC: 8 MEQ/L (ref 8–16)
AST SERPL-CCNC: 19 U/L (ref 5–40)
BASOPHILS # BLD: 0.4 %
BASOPHILS ABSOLUTE: 0 THOU/MM3 (ref 0–0.1)
BILIRUB SERPL-MCNC: 0.2 MG/DL (ref 0.3–1.2)
BUN BLDV-MCNC: 30 MG/DL (ref 7–22)
CALCIUM SERPL-MCNC: 9.4 MG/DL (ref 8.5–10.5)
CHLORIDE BLD-SCNC: 104 MEQ/L (ref 98–111)
CHOLESTEROL, TOTAL: 162 MG/DL (ref 100–199)
CO2: 27 MEQ/L (ref 23–33)
CREAT SERPL-MCNC: 0.8 MG/DL (ref 0.4–1.2)
CREATININE URINE: 104.5 MG/DL
EOSINOPHIL # BLD: 1.7 %
EOSINOPHILS ABSOLUTE: 0.1 THOU/MM3 (ref 0–0.4)
ERYTHROCYTE [DISTWIDTH] IN BLOOD BY AUTOMATED COUNT: 14.4 % (ref 11.5–14.5)
ERYTHROCYTE [DISTWIDTH] IN BLOOD BY AUTOMATED COUNT: 49.2 FL (ref 35–45)
GFR SERPL CREATININE-BSD FRML MDRD: > 90 ML/MIN/1.73M2
GLUCOSE BLD-MCNC: 119 MG/DL (ref 70–108)
HCT VFR BLD CALC: 45.8 % (ref 42–52)
HDLC SERPL-MCNC: 33 MG/DL
HEMOGLOBIN: 14.6 GM/DL (ref 14–18)
IMMATURE GRANS (ABS): 0.02 THOU/MM3 (ref 0–0.07)
IMMATURE GRANULOCYTES: 0.2 %
LDL CHOLESTEROL CALCULATED: 96 MG/DL
LYMPHOCYTES # BLD: 13 %
LYMPHOCYTES ABSOLUTE: 1.1 THOU/MM3 (ref 1–4.8)
MCH RBC QN AUTO: 30.1 PG (ref 26–33)
MCHC RBC AUTO-ENTMCNC: 31.9 GM/DL (ref 32.2–35.5)
MCV RBC AUTO: 94.4 FL (ref 80–94)
MONOCYTES # BLD: 7 %
MONOCYTES ABSOLUTE: 0.6 THOU/MM3 (ref 0.4–1.3)
NUCLEATED RED BLOOD CELLS: 0 /100 WBC
PLATELET # BLD: 200 THOU/MM3 (ref 130–400)
PMV BLD AUTO: 10 FL (ref 9.4–12.4)
POTASSIUM SERPL-SCNC: 4.8 MEQ/L (ref 3.5–5.2)
PROT/CREAT RATIO, UR: 0.08
PROTEIN, URINE: 8.7 MG/DL
RBC # BLD: 4.85 MILL/MM3 (ref 4.7–6.1)
SEG NEUTROPHILS: 77.7 %
SEGMENTED NEUTROPHILS ABSOLUTE COUNT: 6.4 THOU/MM3 (ref 1.8–7.7)
SODIUM BLD-SCNC: 139 MEQ/L (ref 135–145)
TOTAL PROTEIN: 7.5 G/DL (ref 6.1–8)
TRIGL SERPL-MCNC: 164 MG/DL (ref 0–199)
WBC # BLD: 8.3 THOU/MM3 (ref 4.8–10.8)

## 2022-09-20 ENCOUNTER — TELEPHONE (OUTPATIENT)
Dept: FAMILY MEDICINE CLINIC | Age: 52
End: 2022-09-20

## 2022-09-20 ENCOUNTER — HOSPITAL ENCOUNTER (OUTPATIENT)
Age: 52
Discharge: HOME | End: 2022-09-20
Payer: COMMERCIAL

## 2022-09-20 DIAGNOSIS — Z86.711 PERSONAL HISTORY OF PULMONARY EMBOLISM: ICD-10-CM

## 2022-09-20 LAB — INR BLD: 2.06 (ref 0.85–1.13)

## 2022-09-20 PROCEDURE — 85610 PROTHROMBIN TIME: CPT

## 2022-09-20 PROCEDURE — 36415 COLL VENOUS BLD VENIPUNCTURE: CPT

## 2022-09-20 NOTE — TELEPHONE ENCOUNTER
I called and talked with patient regarding his INR result. Per Dr Nena Faulkner take 10mg today and then restart 12.5mg daily and recheck in 1 wk. Pt voiced understanding but will have INR rechecked on Sunday due to work schedule.

## 2022-09-25 ENCOUNTER — HOSPITAL ENCOUNTER (OUTPATIENT)
Age: 52
Discharge: HOME | End: 2022-09-25
Payer: COMMERCIAL

## 2022-09-25 DIAGNOSIS — Z86.711 PERSONAL HISTORY OF PULMONARY EMBOLISM: ICD-10-CM

## 2022-09-25 LAB — INR BLD: 3.27 (ref 0.85–1.13)

## 2022-09-25 PROCEDURE — 36415 COLL VENOUS BLD VENIPUNCTURE: CPT

## 2022-09-25 PROCEDURE — 85610 PROTHROMBIN TIME: CPT

## 2022-09-26 ENCOUNTER — TELEPHONE (OUTPATIENT)
Dept: FAMILY MEDICINE CLINIC | Age: 52
End: 2022-09-26

## 2022-09-26 NOTE — TELEPHONE ENCOUNTER
Scott Le MD   9/26/2022  9:03 AM EDT Back to Top      Please have Neymar Nap reduce weekly warfarin dosing as follows this week: Take 10 mg MWF  Take 12.5 mg all other days  Lia INR 1 week.

## 2022-10-02 ENCOUNTER — HOSPITAL ENCOUNTER (OUTPATIENT)
Age: 52
Discharge: HOME | End: 2022-10-02
Payer: COMMERCIAL

## 2022-10-02 DIAGNOSIS — Z86.711 PERSONAL HISTORY OF PULMONARY EMBOLISM: ICD-10-CM

## 2022-10-02 LAB — INR BLD: 2.22 (ref 0.85–1.13)

## 2022-10-02 PROCEDURE — 36415 COLL VENOUS BLD VENIPUNCTURE: CPT

## 2022-10-02 PROCEDURE — 85610 PROTHROMBIN TIME: CPT

## 2022-10-03 RX ORDER — ESCITALOPRAM OXALATE 20 MG/1
20 TABLET ORAL DAILY
Qty: 90 TABLET | Refills: 3 | Status: SHIPPED | OUTPATIENT
Start: 2022-10-03 | End: 2023-01-01

## 2022-10-03 NOTE — TELEPHONE ENCOUNTER
[facility] sent fax requesting refill on Lexapro 20 mg once daily.    Last appointment this department: 8/30/2022  Next appointment this department: 2/28/2023

## 2022-10-16 ENCOUNTER — HOSPITAL ENCOUNTER (OUTPATIENT)
Age: 52
Discharge: HOME | End: 2022-10-16
Payer: COMMERCIAL

## 2022-10-16 DIAGNOSIS — Z86.711 PERSONAL HISTORY OF PULMONARY EMBOLISM: ICD-10-CM

## 2022-10-16 LAB — INR BLD: 2.43 (ref 0.85–1.13)

## 2022-10-16 PROCEDURE — 85610 PROTHROMBIN TIME: CPT

## 2022-10-16 PROCEDURE — 36415 COLL VENOUS BLD VENIPUNCTURE: CPT

## 2022-11-13 ENCOUNTER — HOSPITAL ENCOUNTER (OUTPATIENT)
Age: 52
Discharge: HOME | End: 2022-11-13
Payer: COMMERCIAL

## 2022-11-13 DIAGNOSIS — Z86.711 PERSONAL HISTORY OF PULMONARY EMBOLISM: ICD-10-CM

## 2022-11-13 LAB — INR BLD: 2.3 (ref 0.85–1.13)

## 2022-11-13 PROCEDURE — 36415 COLL VENOUS BLD VENIPUNCTURE: CPT

## 2022-11-13 PROCEDURE — 85610 PROTHROMBIN TIME: CPT

## 2022-12-02 RX ORDER — FLUTICASONE PROPIONATE AND SALMETEROL 250; 50 UG/1; UG/1
1 POWDER RESPIRATORY (INHALATION) 2 TIMES DAILY
Qty: 60 EACH | Refills: 11 | Status: SHIPPED | OUTPATIENT
Start: 2022-12-02

## 2022-12-07 ENCOUNTER — HOSPITAL ENCOUNTER (OUTPATIENT)
Age: 52
Discharge: HOME | End: 2022-12-07
Payer: COMMERCIAL

## 2022-12-07 DIAGNOSIS — Z86.718 HX OF BLOOD CLOTS: ICD-10-CM

## 2022-12-07 DIAGNOSIS — Z86.79 PERSONAL HISTORY OF ATRIAL FIBRILLATION: ICD-10-CM

## 2022-12-07 DIAGNOSIS — Z79.01 ANTICOAGULATED ON COUMADIN: ICD-10-CM

## 2022-12-07 LAB — INR BLD: 1.12 (ref 0.85–1.13)

## 2022-12-07 PROCEDURE — 85610 PROTHROMBIN TIME: CPT

## 2022-12-07 PROCEDURE — 36415 COLL VENOUS BLD VENIPUNCTURE: CPT

## 2022-12-12 ENCOUNTER — TELEPHONE (OUTPATIENT)
Dept: FAMILY MEDICINE CLINIC | Age: 52
End: 2022-12-12

## 2022-12-12 NOTE — TELEPHONE ENCOUNTER
Patient called and was scheduled to have INR checked yesterday but was unable to get done. Pt had colonoscopy done on Friday and had stopped his Coumadin for 5 days prior. Pt is wanting to know if waiting until this Sunday will be ok.

## 2022-12-18 ENCOUNTER — HOSPITAL ENCOUNTER (OUTPATIENT)
Age: 52
Discharge: HOME | End: 2022-12-18
Payer: COMMERCIAL

## 2022-12-18 LAB — INR BLD: 1.95 (ref 0.85–1.13)

## 2022-12-18 PROCEDURE — 85610 PROTHROMBIN TIME: CPT

## 2022-12-18 PROCEDURE — 36415 COLL VENOUS BLD VENIPUNCTURE: CPT

## 2022-12-27 ENCOUNTER — HOSPITAL ENCOUNTER (OUTPATIENT)
Age: 52
Discharge: HOME | End: 2022-12-27
Payer: COMMERCIAL

## 2022-12-27 LAB — INR BLD: 2.78 (ref 0.85–1.13)

## 2022-12-27 PROCEDURE — 85610 PROTHROMBIN TIME: CPT

## 2022-12-27 PROCEDURE — 36415 COLL VENOUS BLD VENIPUNCTURE: CPT

## 2023-01-10 ENCOUNTER — TELEPHONE (OUTPATIENT)
Dept: FAMILY MEDICINE CLINIC | Age: 53
End: 2023-01-10

## 2023-01-10 NOTE — TELEPHONE ENCOUNTER
Patient called. Needs refill for metoprolol 25 mg twice daily, 90 days if possible, [employer]. Please advise. Thank you.

## 2023-01-15 ENCOUNTER — HOSPITAL ENCOUNTER (OUTPATIENT)
Age: 53
Discharge: HOME | End: 2023-01-15
Payer: COMMERCIAL

## 2023-01-15 LAB — INR BLD: 2.42 (ref 0.85–1.13)

## 2023-01-15 PROCEDURE — 36415 COLL VENOUS BLD VENIPUNCTURE: CPT

## 2023-01-15 PROCEDURE — 85610 PROTHROMBIN TIME: CPT

## 2023-02-12 ENCOUNTER — HOSPITAL ENCOUNTER (OUTPATIENT)
Age: 53
Discharge: HOME | End: 2023-02-12
Payer: COMMERCIAL

## 2023-02-12 LAB — INR PPP: 1.66 (ref 0.85–1.13)

## 2023-02-12 PROCEDURE — 85610 PROTHROMBIN TIME: CPT

## 2023-02-12 PROCEDURE — 36415 COLL VENOUS BLD VENIPUNCTURE: CPT

## 2023-02-14 ENCOUNTER — OFFICE VISIT (OUTPATIENT)
Dept: FAMILY MEDICINE CLINIC | Age: 53
End: 2023-02-14

## 2023-02-14 VITALS
WEIGHT: 315 LBS | DIASTOLIC BLOOD PRESSURE: 80 MMHG | HEIGHT: 73 IN | BODY MASS INDEX: 41.75 KG/M2 | SYSTOLIC BLOOD PRESSURE: 138 MMHG | HEART RATE: 74 BPM

## 2023-02-14 DIAGNOSIS — Z79.01 ANTICOAGULATED ON COUMADIN: ICD-10-CM

## 2023-02-14 DIAGNOSIS — E11.9 TYPE 2 DIABETES MELLITUS WITHOUT COMPLICATION, WITHOUT LONG-TERM CURRENT USE OF INSULIN (HCC): Primary | ICD-10-CM

## 2023-02-14 DIAGNOSIS — Z86.711 PERSONAL HISTORY OF PULMONARY EMBOLISM: ICD-10-CM

## 2023-02-14 DIAGNOSIS — I10 PRIMARY HYPERTENSION: ICD-10-CM

## 2023-02-14 DIAGNOSIS — I48.0 PAROXYSMAL ATRIAL FIBRILLATION (HCC): ICD-10-CM

## 2023-02-14 DIAGNOSIS — G47.33 OSA (OBSTRUCTIVE SLEEP APNEA): ICD-10-CM

## 2023-02-14 LAB — HBA1C MFR BLD: 5.9 %

## 2023-02-14 RX ORDER — BUSPIRONE HYDROCHLORIDE 10 MG/1
10 TABLET ORAL 2 TIMES DAILY
Qty: 180 TABLET | Refills: 3 | Status: SHIPPED | OUTPATIENT
Start: 2023-02-14 | End: 2023-03-16

## 2023-02-14 RX ORDER — WARFARIN SODIUM 5 MG/1
5 TABLET ORAL DAILY
Qty: 270 TABLET | Refills: 3 | Status: SHIPPED | OUTPATIENT
Start: 2023-02-14

## 2023-02-14 RX ORDER — VALSARTAN 80 MG/1
80 TABLET ORAL DAILY
Qty: 90 TABLET | Refills: 3 | Status: SHIPPED | OUTPATIENT
Start: 2023-02-14

## 2023-02-14 RX ORDER — SIMVASTATIN 20 MG
20 TABLET ORAL DAILY
Qty: 90 TABLET | Refills: 3 | Status: SHIPPED | OUTPATIENT
Start: 2023-02-14

## 2023-02-14 RX ORDER — ESCITALOPRAM OXALATE 20 MG/1
20 TABLET ORAL DAILY
Qty: 90 TABLET | Refills: 3 | Status: SHIPPED | OUTPATIENT
Start: 2023-02-14 | End: 2023-05-15

## 2023-02-14 SDOH — ECONOMIC STABILITY: FOOD INSECURITY: WITHIN THE PAST 12 MONTHS, YOU WORRIED THAT YOUR FOOD WOULD RUN OUT BEFORE YOU GOT MONEY TO BUY MORE.: NEVER TRUE

## 2023-02-14 SDOH — ECONOMIC STABILITY: INCOME INSECURITY: HOW HARD IS IT FOR YOU TO PAY FOR THE VERY BASICS LIKE FOOD, HOUSING, MEDICAL CARE, AND HEATING?: NOT HARD AT ALL

## 2023-02-14 SDOH — ECONOMIC STABILITY: HOUSING INSECURITY
IN THE LAST 12 MONTHS, WAS THERE A TIME WHEN YOU DID NOT HAVE A STEADY PLACE TO SLEEP OR SLEPT IN A SHELTER (INCLUDING NOW)?: NO

## 2023-02-14 SDOH — ECONOMIC STABILITY: FOOD INSECURITY: WITHIN THE PAST 12 MONTHS, THE FOOD YOU BOUGHT JUST DIDN'T LAST AND YOU DIDN'T HAVE MONEY TO GET MORE.: NEVER TRUE

## 2023-02-14 ASSESSMENT — ANXIETY QUESTIONNAIRES
4. TROUBLE RELAXING: 1
6. BECOMING EASILY ANNOYED OR IRRITABLE: 1
IF YOU CHECKED OFF ANY PROBLEMS ON THIS QUESTIONNAIRE, HOW DIFFICULT HAVE THESE PROBLEMS MADE IT FOR YOU TO DO YOUR WORK, TAKE CARE OF THINGS AT HOME, OR GET ALONG WITH OTHER PEOPLE: NOT DIFFICULT AT ALL
GAD7 TOTAL SCORE: 7
1. FEELING NERVOUS, ANXIOUS, OR ON EDGE: 1
5. BEING SO RESTLESS THAT IT IS HARD TO SIT STILL: 1
2. NOT BEING ABLE TO STOP OR CONTROL WORRYING: 1
7. FEELING AFRAID AS IF SOMETHING AWFUL MIGHT HAPPEN: 1
3. WORRYING TOO MUCH ABOUT DIFFERENT THINGS: 1

## 2023-02-14 ASSESSMENT — PATIENT HEALTH QUESTIONNAIRE - PHQ9
SUM OF ALL RESPONSES TO PHQ QUESTIONS 1-9: 2
1. LITTLE INTEREST OR PLEASURE IN DOING THINGS: 1
SUM OF ALL RESPONSES TO PHQ QUESTIONS 1-9: 2
SUM OF ALL RESPONSES TO PHQ QUESTIONS 1-9: 2
SUM OF ALL RESPONSES TO PHQ9 QUESTIONS 1 & 2: 2
2. FEELING DOWN, DEPRESSED OR HOPELESS: 1
SUM OF ALL RESPONSES TO PHQ QUESTIONS 1-9: 2

## 2023-02-14 NOTE — PROGRESS NOTES
[facility] [address] [place] [address] F[place] Harden [id number]  Dept: Yovany [address] Clint Hernandez is a 47 y.o.male    Pt presents for follow up medications.     Pt feeling ok since last visit- interval history and any new issues noted below:   Increased anxiety/panic symptoms over last several weeks   More anhedonia as well  No SI    Trying to exercise  Stopped drinking and smoking    Feeling good physically    Patient Active Problem List   Diagnosis    PSVT (paroxysmal supraventricular tachycardia) (HCC)    Hypertension    GAETANO (obstructive sleep apnea)    Type 2 diabetes mellitus without complication, without long-term current use of insulin (HCC)    History of tobacco use disorder    Macrocytosis without anemia    Personal history of pulmonary embolism    Anticoagulated on Coumadin    Paroxysmal atrial fibrillation (HCC)    Class 3 severe obesity due to excess calories with serious comorbidity and body mass index (BMI) of 45.0 to 49.9 in [facility])    Generalized anxiety disorder       Current Outpatient Medications   Medication Sig Dispense Refill    busPIRone (BUSPAR) 10 MG tablet Take 1 tablet by mouth 2 times daily 180 tablet 3    simvastatin (ZOCOR) 20 MG tablet Take 1 tablet by mouth daily 90 tablet 3    valsartan (DIOVAN) 80 MG tablet Take 1 tablet by mouth daily 90 tablet 3    warfarin (COUMADIN) 5 MG tablet Take 1 tablet by mouth daily 2.5 tablets daily 270 tablet 3    metFORMIN (GLUCOPHAGE) 500 MG tablet Take 1 tablet by mouth 2 times daily (with meals) 180 tablet 3    escitalopram (LEXAPRO) 20 MG tablet Take 1 tablet by mouth daily 90 tablet 3    metoprolol tartrate (LOPRESSOR) 25 MG tablet Take 1 tablet by mouth 2 times daily 180 tablet 3    fluticasone-salmeterol (ADVAIR) 250-50 MCG/ACT AEPB diskus inhaler Inhale 1 puff into the lungs 2 times daily 60 each 11    albuterol sulfate HFA (PROVENTIL;VENTOLIN;PROAIR) 108 (90 Base) MCG/ACT inhaler Inhale 2 puffs into the lungs every 6 hours as needed      triamcinolone (KENALOG) 0.1 % cream Apply topically 2 times daily. 45 g 1     No current facility-administered medications for this visit. Review of Systems   Constitutional:  Positive for fatigue. Psychiatric/Behavioral:  Positive for sleep disturbance. The patient is nervous/anxious. OBJECTIVE     /80 (Site: Left Upper Arm, Position: Sitting, Cuff Size: Large Adult)   Pulse 74   Ht 6' 1\" (1.854 m)   Wt (!) 355 lb 6.4 oz (161.2 kg)   BMI 46.89 kg/m²   Body mass index is 46.89 kg/m². BP Readings from Last 3 Encounters:   02/14/23 138/80   12/29/22 (!) 152/78   11/09/22 135/70         Physical Exam  Vitals and nursing note reviewed. Constitutional:       General: He is not in acute distress. Appearance: Normal appearance. He is normal weight. He is not ill-appearing. HENT:      Head: Normocephalic and atraumatic. Right Ear: Tympanic membrane, ear canal and external ear normal.      Left Ear: Tympanic membrane, ear canal and external ear normal.      Nose: Nose normal.      Mouth/Throat:      Mouth: Mucous membranes are moist.      Pharynx: Oropharynx is clear. Eyes:      Extraocular Movements: Extraocular movements intact. Conjunctiva/sclera: Conjunctivae normal.      Pupils: Pupils are equal, round, and reactive to light. Cardiovascular:      Rate and Rhythm: Normal rate and regular rhythm. Pulses: Normal pulses. Heart sounds: No murmur heard. Pulmonary:      Effort: Pulmonary effort is normal. No respiratory distress. Breath sounds: Normal breath sounds. No wheezing. Abdominal:      General: Abdomen is flat. Bowel sounds are normal. There is no distension. Palpations: Abdomen is soft. There is no mass. Tenderness: There is no abdominal tenderness. There is no guarding or rebound. Musculoskeletal:         General: No swelling. Normal range of motion. Cervical back: Normal range of motion.  No tenderness. Right lower leg: No edema. Left lower leg: No edema. Lymphadenopathy:      Cervical: No cervical adenopathy. Skin:     General: Skin is warm. Findings: No rash. Neurological:      General: No focal deficit present. Mental Status: He is alert. Psychiatric:         Mood and Affect: Mood normal.       Results for POC orders placed in visit on 02/14/23   POCT glycosylated hemoglobin (Hb A1C)   Result Value Ref Range    Hemoglobin A1C 5.9 %       Lab Results   Component Value Date    LABA1C 5.9 02/14/2023       Lab Results   Component Value Date    CHOL 162 09/19/2022    TRIG 164 09/19/2022    HDL 33 09/19/2022    LDLCALC 96 09/19/2022       The 10-year ASCVD risk score (Chacorta POLLOCK, et al., 2019) is: 11%    Values used to calculate the score:      Age: 47 years      Sex: Male      Is Non- : No      Diabetic: Yes      Tobacco smoker: No      Systolic Blood Pressure: 574 mmHg      Is BP treated: Yes      HDL Cholesterol: 33 mg/dL      Total Cholesterol: 162 mg/dL    Lab Results   Component Value Date     09/19/2022    K 4.8 09/19/2022     09/19/2022    CO2 27 09/19/2022    BUN 30 (H) 09/19/2022    CREATININE 0.8 09/19/2022    GLUCOSE 119 (H) 09/19/2022    CALCIUM 9.4 09/19/2022    PROT 7.5 09/19/2022    LABALBU 4.3 09/19/2022    BILITOT 0.2 (L) 09/19/2022    ALKPHOS 110 09/19/2022    AST 19 09/19/2022    ALT 29 09/19/2022    LABGLOM >90 09/19/2022     estimated creatinine clearance is 174 mL/min (based on SCr of 0.8 mg/dL).      No results found for: [place]    Lab Results   Component Value Date    TSH 1.790 04/17/2021       Lab Results   Component Value Date    WBC 8.3 09/19/2022    HGB 14.6 09/19/2022    HCT 45.8 09/19/2022    MCV 94.4 (H) 09/19/2022     09/19/2022       No results found for: PSA, PSADIA    Immunization History   Administered Date(s) Administered    COVID-19, MODERNA BLUE border, Primary or Immunocompromised, (age 12y+), IM, 100 mcg/0.5mL 08/27/2021, 09/24/2021, 02/21/2022    COVID-19, MODERNA Bivalent BOOSTER, (age 12y+), IM, 50 mcg/0.5 mL 10/22/2022    Influenza Virus Vaccine 10/22/2022    Influenza, FLUARIX, FLULAVAL, FLUZONE (age 10 mo+) AND AFLURIA, (age 1 y+), PF, 0.5mL 09/24/2021    Pneumococcal conjugate PCV20, PF (Prevnar 20) 04/08/2022    Tdap (Boostrix, Adacel) 04/08/2022    Zoster Recombinant (Shingrix) 04/08/2022, 07/23/2022       Health Maintenance   Topic Date Due    HIV screen  Never done    Diabetic retinal exam  Never done    Hepatitis C screen  Never done    Hepatitis B vaccine (1 of 3 - Risk 3-dose series) Never done    Low dose CT lung screening  Never done    Diabetic foot exam  08/30/2023    Diabetic Alb to Cr ratio (uACR) test  09/19/2023    Lipids  09/19/2023    GFR test (Diabetes, CKD 3-4, OR last GFR 15-59)  09/19/2023    A1C test (Diabetic or Prediabetic)  02/14/2024    Depression Screen  02/14/2024    DTaP/Tdap/Td vaccine (2 - Td or Tdap) 04/08/2032    Colorectal Cancer Screen  12/08/2032    Flu vaccine  Completed    Shingles vaccine  Completed    Pneumococcal 0-64 years Vaccine  Completed    COVID-19 Vaccine  Completed    Hepatitis A vaccine  Aged Out    Hib vaccine  Aged Out    Meningococcal (ACWY) vaccine  Aged Out       Future Appointments   Date Time Provider Yassine Haisti   5/16/2023  8:00 AM [facility]  [place]   9/7/2023 12:45  [address], MD N SRPX [facility] [place]         ASSESSMENT       Diagnosis Orders   1.  Type 2 diabetes mellitus without complication, without long-term current use of insulin (HCC)  CBC with Auto Differential    Comprehensive Metabolic Panel    Hemoglobin A1C    Lipid Panel    Microalbumin / Creatinine Urine Ratio    POCT glycosylated hemoglobin (Hb A1C)      2. Paroxysmal atrial fibrillation (HCC)  CBC with Auto Differential    Comprehensive Metabolic Panel    Hemoglobin A1C    Lipid Panel    Microalbumin / Creatinine Urine Ratio 3. GAETANO (obstructive sleep apnea)        4. Primary hypertension  CBC with Auto Differential    Comprehensive Metabolic Panel    Hemoglobin A1C    Lipid Panel    Microalbumin / Creatinine Urine Ratio      5. Anticoagulated on Coumadin        6. Personal history of pulmonary embolism  CBC with Auto Differential    Comprehensive Metabolic Panel    Hemoglobin A1C    Lipid Panel    Microalbumin / Creatinine Urine Ratio          PLAN      1. Type 2 diabetes mellitus without complication, without long-term current use of insulin (HCC)  A1c looks great 5.9%. Reviewed diet/exercise. Keep working at weight loss (gained 10 lbs since summertime). Reviewed immunizations. Fasting labs for next visit. Foot exam due next visit  Yearly eye exam recommended  Discuss intensification of statin next visit pending results of lipid panel- ASCVD 12.9%    - CBC with Auto Differential; Future  - Comprehensive Metabolic Panel; Future  - Hemoglobin A1C; Future  - Lipid Panel; Future  - Microalbumin / Creatinine Urine Ratio; Future  - POCT glycosylated hemoglobin (Hb A1C)    2. Paroxysmal atrial fibrillation (HCC)  Remains AC on warfarin, rate controlled. - CBC with Auto Differential; Future  - Comprehensive Metabolic Panel; Future  - Hemoglobin A1C; Future  - Lipid Panel; Future  - Microalbumin / Creatinine Urine Ratio; Future    3. GAETANO (obstructive sleep apnea)  Follows with Pulmonology, compliant with use of CPAP    4. Primary hypertension  AT goal today. No change to valsartan 80 or metoprolol 25 BID. Encouraged exercise and weight loss, salt reduced diet. - CBC with Auto Differential; Future  - Comprehensive Metabolic Panel; Future  - Hemoglobin A1C; Future  - Lipid Panel; Future  - Microalbumin / Creatinine Urine Ratio; Future    5. Anticoagulated on Coumadin  INR 1.6 this week- instructed 3 tablets Monday and then resume usual dosing schedule (2.5 tabs MWFS and 2 tablets other days). Repeat INR on Monday     6.  Personal history of pulmonary embolism  AC warfarin.            Sissy Gonzalez MD  9:40 AM  2/15/23

## 2023-02-19 ENCOUNTER — HOSPITAL ENCOUNTER (OUTPATIENT)
Age: 53
Discharge: HOME | End: 2023-02-19
Payer: COMMERCIAL

## 2023-02-19 LAB — INR PPP: 2.99 (ref 0.85–1.13)

## 2023-02-19 PROCEDURE — 85610 PROTHROMBIN TIME: CPT

## 2023-02-19 PROCEDURE — 36415 COLL VENOUS BLD VENIPUNCTURE: CPT

## 2023-02-26 ENCOUNTER — HOSPITAL ENCOUNTER (OUTPATIENT)
Age: 53
Discharge: HOME | End: 2023-02-26
Payer: COMMERCIAL

## 2023-02-26 LAB — INR PPP: 3.24 (ref 0.85–1.13)

## 2023-02-26 PROCEDURE — 36415 COLL VENOUS BLD VENIPUNCTURE: CPT

## 2023-02-26 PROCEDURE — 85610 PROTHROMBIN TIME: CPT

## 2023-02-27 ENCOUNTER — TELEPHONE (OUTPATIENT)
Dept: FAMILY MEDICINE CLINIC | Age: 53
End: 2023-02-27

## 2023-02-27 NOTE — TELEPHONE ENCOUNTER
----- Message from Katelyn Ann Leopold, MD sent at 2/27/2023  9:23 AM EST -----  Please have Luis take 1/2 his usual warfarin dose today and then resume usual dosing schedule.  Lia INR by Friday

## 2023-03-03 ENCOUNTER — HOSPITAL ENCOUNTER (OUTPATIENT)
Age: 53
Discharge: HOME | End: 2023-03-03
Payer: COMMERCIAL

## 2023-03-03 LAB — INR PPP: 2.86 (ref 0.85–1.13)

## 2023-03-03 PROCEDURE — 36415 COLL VENOUS BLD VENIPUNCTURE: CPT

## 2023-03-03 PROCEDURE — 85610 PROTHROMBIN TIME: CPT

## 2023-03-19 ENCOUNTER — HOSPITAL ENCOUNTER (OUTPATIENT)
Age: 53
Discharge: HOME | End: 2023-03-19
Payer: COMMERCIAL

## 2023-03-19 LAB — INR PPP: 3.65 (ref 0.85–1.13)

## 2023-03-19 PROCEDURE — 85610 PROTHROMBIN TIME: CPT

## 2023-03-19 PROCEDURE — 36415 COLL VENOUS BLD VENIPUNCTURE: CPT

## 2023-03-21 ENCOUNTER — HOSPITAL ENCOUNTER (OUTPATIENT)
Age: 53
Discharge: HOME | End: 2023-03-21
Payer: COMMERCIAL

## 2023-03-21 LAB — INR PPP: 2.57 (ref 0.85–1.13)

## 2023-03-21 PROCEDURE — 36415 COLL VENOUS BLD VENIPUNCTURE: CPT

## 2023-03-21 PROCEDURE — 85610 PROTHROMBIN TIME: CPT

## 2023-03-26 ENCOUNTER — HOSPITAL ENCOUNTER (OUTPATIENT)
Age: 53
Discharge: HOME | End: 2023-03-26
Payer: COMMERCIAL

## 2023-03-26 LAB — INR PPP: 2.6 (ref 0.85–1.13)

## 2023-03-26 PROCEDURE — 36415 COLL VENOUS BLD VENIPUNCTURE: CPT

## 2023-03-26 PROCEDURE — 85610 PROTHROMBIN TIME: CPT

## 2023-03-27 ENCOUNTER — TELEPHONE (OUTPATIENT)
Dept: FAMILY MEDICINE CLINIC | Age: 53
End: 2023-03-27

## 2023-03-27 NOTE — TELEPHONE ENCOUNTER
----- Message from Job Frye MD sent at 3/27/2023  9:37 AM EDT -----  INR looks good today- please recheck in 2 weeks to ensure stability.

## 2023-04-08 ENCOUNTER — HOSPITAL ENCOUNTER (OUTPATIENT)
Age: 53
Discharge: HOME | End: 2023-04-08
Payer: COMMERCIAL

## 2023-04-08 LAB — INR PPP: 3.28 (ref 0.85–1.13)

## 2023-04-08 PROCEDURE — 36415 COLL VENOUS BLD VENIPUNCTURE: CPT

## 2023-04-08 PROCEDURE — 85610 PROTHROMBIN TIME: CPT

## 2023-04-16 ENCOUNTER — HOSPITAL ENCOUNTER (OUTPATIENT)
Age: 53
Discharge: HOME | End: 2023-04-16
Payer: COMMERCIAL

## 2023-04-16 LAB — INR PPP: 3.56 (ref 0.85–1.13)

## 2023-04-16 PROCEDURE — 85610 PROTHROMBIN TIME: CPT

## 2023-04-16 PROCEDURE — 36415 COLL VENOUS BLD VENIPUNCTURE: CPT

## 2023-04-23 ENCOUNTER — HOSPITAL ENCOUNTER (OUTPATIENT)
Age: 53
Discharge: HOME | End: 2023-04-23
Payer: COMMERCIAL

## 2023-04-23 LAB — INR PPP: 3.13 (ref 0.85–1.13)

## 2023-04-23 PROCEDURE — 85610 PROTHROMBIN TIME: CPT

## 2023-04-23 PROCEDURE — 36415 COLL VENOUS BLD VENIPUNCTURE: CPT

## 2023-04-30 ENCOUNTER — HOSPITAL ENCOUNTER (OUTPATIENT)
Age: 53
Discharge: HOME | End: 2023-04-30
Payer: COMMERCIAL

## 2023-04-30 LAB — INR PPP: 2.26 (ref 0.85–1.13)

## 2023-04-30 PROCEDURE — 36415 COLL VENOUS BLD VENIPUNCTURE: CPT

## 2023-04-30 PROCEDURE — 85610 PROTHROMBIN TIME: CPT

## 2023-05-01 RX ORDER — ALBUTEROL SULFATE 90 UG/1
2 AEROSOL, METERED RESPIRATORY (INHALATION) EVERY 6 HOURS PRN
Qty: 18 G | Refills: 3 | Status: SHIPPED | OUTPATIENT
Start: 2023-05-01

## 2023-05-14 ENCOUNTER — HOSPITAL ENCOUNTER (OUTPATIENT)
Age: 53
Discharge: HOME | End: 2023-05-14
Payer: COMMERCIAL

## 2023-05-14 LAB — INR PPP: 2.93 (ref 0.85–1.13)

## 2023-05-14 PROCEDURE — 85610 PROTHROMBIN TIME: CPT

## 2023-05-14 PROCEDURE — 36415 COLL VENOUS BLD VENIPUNCTURE: CPT

## 2023-05-15 ENCOUNTER — HOSPITAL ENCOUNTER (OUTPATIENT)
Age: 53
Discharge: HOME | End: 2023-05-15
Payer: COMMERCIAL

## 2023-05-15 ENCOUNTER — TELEPHONE (OUTPATIENT)
Dept: FAMILY MEDICINE CLINIC | Age: 53
End: 2023-05-15

## 2023-05-15 DIAGNOSIS — I48.0 PAROXYSMAL ATRIAL FIBRILLATION (HCC): ICD-10-CM

## 2023-05-15 DIAGNOSIS — E11.9 TYPE 2 DIABETES MELLITUS WITHOUT COMPLICATION, WITHOUT LONG-TERM CURRENT USE OF INSULIN (HCC): ICD-10-CM

## 2023-05-15 DIAGNOSIS — Z86.711 PERSONAL HISTORY OF PULMONARY EMBOLISM: ICD-10-CM

## 2023-05-15 DIAGNOSIS — I10 PRIMARY HYPERTENSION: ICD-10-CM

## 2023-05-15 LAB
ALBUMIN SERPL BCG-MCNC: 4.7 G/DL (ref 3.5–5.1)
ALP SERPL-CCNC: 114 U/L (ref 38–126)
ALT SERPL W/O P-5'-P-CCNC: 32 U/L (ref 11–66)
ANION GAP SERPL CALC-SCNC: 10 MEQ/L (ref 8–16)
AST SERPL-CCNC: 20 U/L (ref 5–40)
BASOPHILS ABSOLUTE: 0 THOU/MM3 (ref 0–0.1)
BASOPHILS NFR BLD AUTO: 0.3 %
BILIRUB SERPL-MCNC: 0.3 MG/DL (ref 0.3–1.2)
BUN SERPL-MCNC: 30 MG/DL (ref 7–22)
CALCIUM SERPL-MCNC: 9.5 MG/DL (ref 8.5–10.5)
CHLORIDE SERPL-SCNC: 104 MEQ/L (ref 98–111)
CHOLEST SERPL-MCNC: 160 MG/DL (ref 100–199)
CO2 SERPL-SCNC: 27 MEQ/L (ref 23–33)
CREAT SERPL-MCNC: 0.9 MG/DL (ref 0.4–1.2)
CREAT UR-MCNC: 136 MG/DL
DEPRECATED MEAN GLUCOSE BLD GHB EST-ACNC: 108 MG/DL (ref 70–126)
DEPRECATED RDW RBC AUTO: 49.6 FL (ref 35–45)
EOSINOPHIL NFR BLD AUTO: 1.4 %
EOSINOPHILS ABSOLUTE: 0.1 THOU/MM3 (ref 0–0.4)
ERYTHROCYTE [DISTWIDTH] IN BLOOD BY AUTOMATED COUNT: 14.4 % (ref 11.5–14.5)
GFR SERPL CREATININE-BSD FRML MDRD: > 60 ML/MIN/1.73M2
GLUCOSE SERPL-MCNC: 123 MG/DL (ref 70–108)
HBA1C MFR BLD HPLC: 5.6 % (ref 4.4–6.4)
HCT VFR BLD AUTO: 47.3 % (ref 42–52)
HDLC SERPL-MCNC: 36 MG/DL
HGB BLD-MCNC: 14.8 GM/DL (ref 14–18)
IMM GRANULOCYTES # BLD AUTO: 0.03 THOU/MM3 (ref 0–0.07)
IMM GRANULOCYTES NFR BLD AUTO: 0.3 %
LDLC SERPL CALC-MCNC: 98 MG/DL
LYMPHOCYTES ABSOLUTE: 1.3 THOU/MM3 (ref 1–4.8)
LYMPHOCYTES NFR BLD AUTO: 13.6 %
MCH RBC QN AUTO: 29.7 PG (ref 26–33)
MCHC RBC AUTO-ENTMCNC: 31.3 GM/DL (ref 32.2–35.5)
MCV RBC AUTO: 94.8 FL (ref 80–94)
MICROALBUMIN UR-MCNC: < 1.2 MG/DL
MICROALBUMIN/CREAT RATIO PNL UR: 9 MG/G (ref 0–30)
MONOCYTES ABSOLUTE: 0.7 THOU/MM3 (ref 0.4–1.3)
MONOCYTES NFR BLD AUTO: 7.8 %
NEUTROPHILS NFR BLD AUTO: 76.6 %
NRBC BLD AUTO-RTO: 0 /100 WBC
PLATELET # BLD AUTO: 182 THOU/MM3 (ref 130–400)
PMV BLD AUTO: 10 FL (ref 9.4–12.4)
POTASSIUM SERPL-SCNC: 5.8 MEQ/L (ref 3.5–5.2)
PROT SERPL-MCNC: 8 G/DL (ref 6.1–8)
RBC # BLD AUTO: 4.99 MILL/MM3 (ref 4.7–6.1)
SEGMENTED NEUTROPHILS ABSOLUTE COUNT: 7.1 THOU/MM3 (ref 1.8–7.7)
SODIUM SERPL-SCNC: 141 MEQ/L (ref 135–145)
TRIGL SERPL-MCNC: 132 MG/DL (ref 0–199)
WBC # BLD AUTO: 9.3 THOU/MM3 (ref 4.8–10.8)

## 2023-05-15 PROCEDURE — 80053 COMPREHEN METABOLIC PANEL: CPT

## 2023-05-15 PROCEDURE — 36415 COLL VENOUS BLD VENIPUNCTURE: CPT

## 2023-05-15 PROCEDURE — 85025 COMPLETE CBC W/AUTO DIFF WBC: CPT

## 2023-05-15 PROCEDURE — 83036 HEMOGLOBIN GLYCOSYLATED A1C: CPT

## 2023-05-15 PROCEDURE — 80061 LIPID PANEL: CPT

## 2023-05-15 PROCEDURE — 82043 UR ALBUMIN QUANTITATIVE: CPT

## 2023-05-15 NOTE — TELEPHONE ENCOUNTER
----- Message from Alda Chopra MD sent at 5/15/2023  9:24 AM EDT -----    ----- Message -----  From: [facility] Incoming Lab Results From Soft  Sent: 5/14/2023  12:44 PM EDT  To: Alda Chopra MD

## 2023-05-16 ENCOUNTER — OFFICE VISIT (OUTPATIENT)
Dept: FAMILY MEDICINE CLINIC | Age: 53
End: 2023-05-16
Payer: COMMERCIAL

## 2023-05-16 VITALS
HEIGHT: 73 IN | SYSTOLIC BLOOD PRESSURE: 118 MMHG | DIASTOLIC BLOOD PRESSURE: 78 MMHG | OXYGEN SATURATION: 96 % | HEART RATE: 68 BPM | WEIGHT: 315 LBS | BODY MASS INDEX: 41.75 KG/M2

## 2023-05-16 DIAGNOSIS — E11.9 TYPE 2 DIABETES MELLITUS WITHOUT COMPLICATION, WITHOUT LONG-TERM CURRENT USE OF INSULIN (HCC): Primary | ICD-10-CM

## 2023-05-16 DIAGNOSIS — E66.01 CLASS 3 SEVERE OBESITY DUE TO EXCESS CALORIES WITH SERIOUS COMORBIDITY AND BODY MASS INDEX (BMI) OF 45.0 TO 49.9 IN ADULT (HCC): ICD-10-CM

## 2023-05-16 DIAGNOSIS — I48.0 PAROXYSMAL ATRIAL FIBRILLATION (HCC): ICD-10-CM

## 2023-05-16 DIAGNOSIS — I10 PRIMARY HYPERTENSION: ICD-10-CM

## 2023-05-16 PROCEDURE — 3078F DIAST BP <80 MM HG: CPT | Performed by: FAMILY MEDICINE

## 2023-05-16 PROCEDURE — 99214 OFFICE O/P EST MOD 30 MIN: CPT | Performed by: FAMILY MEDICINE

## 2023-05-16 PROCEDURE — 3044F HG A1C LEVEL LT 7.0%: CPT | Performed by: FAMILY MEDICINE

## 2023-05-16 PROCEDURE — 3074F SYST BP LT 130 MM HG: CPT | Performed by: FAMILY MEDICINE

## 2023-05-16 RX ORDER — BUSPIRONE HYDROCHLORIDE 10 MG/1
10 TABLET ORAL 2 TIMES DAILY
COMMUNITY
Start: 2023-04-28

## 2023-05-16 ASSESSMENT — ENCOUNTER SYMPTOMS
SORE THROAT: 0
ABDOMINAL PAIN: 0
TROUBLE SWALLOWING: 0
CONSTIPATION: 0
SHORTNESS OF BREATH: 0
DIARRHEA: 0
COUGH: 0
VOMITING: 0

## 2023-05-16 NOTE — PROGRESS NOTES
ASSESSMENT       Diagnosis Orders   1. Type 2 diabetes mellitus without complication, without long-term current use of insulin (HCC)        2. Paroxysmal atrial fibrillation (HCC)        3. Class 3 severe obesity due to excess calories with serious comorbidity and body mass index (BMI) of 45.0 to 49.9 in adult ([address]        4. Primary hypertension            PLAN      1. Type 2 diabetes mellitus without complication, without long-term current use of insulin (HCC)  A1c improved to 5.6%. Tolerates metformin well. Foot exam due 8/23. Eye exam due- pt to schedule. Reviewed immunizations. On statin and ARB. 2. Paroxysmal atrial fibrillation (HCC)  Rate controlled, AC on warfarin. 3. Class 3 severe obesity due to excess calories with serious comorbidity and body mass index (BMI) of 45.0 to 49.9 in adult [facility])  Lost 5 lbs since Feb.  Weight lifting several days per week. Physically active painting outdoors during the summer. Keep working towards additional weight loss.      4. Primary hypertension  At goal.      Revisit 3 months with POC A1c in office               Michelle Lyons MD  8:42 AM  5/16/23

## 2023-06-04 ENCOUNTER — HOSPITAL ENCOUNTER (OUTPATIENT)
Age: 53
Discharge: HOME | End: 2023-06-04
Payer: COMMERCIAL

## 2023-06-04 LAB — INR PPP: 3.46 (ref 0.85–1.13)

## 2023-06-04 PROCEDURE — 36415 COLL VENOUS BLD VENIPUNCTURE: CPT

## 2023-06-04 PROCEDURE — 85610 PROTHROMBIN TIME: CPT

## 2023-06-05 ENCOUNTER — TELEPHONE (OUTPATIENT)
Dept: FAMILY MEDICINE CLINIC | Age: 53
End: 2023-06-05

## 2023-06-05 NOTE — TELEPHONE ENCOUNTER
----- Message from Elizabeth Avitia MD sent at 6/5/2023 12:56 PM EDT -----  Please instruct Susannah Gustafson to hold warfarin dose today and then start new regimen as follows:  10 mg (2 tabs) MWF  12.5 mg (2.5 tabs) Sunday, Tues, Thurs, Sat  Recheck INR in 1 week

## 2023-06-18 ENCOUNTER — HOSPITAL ENCOUNTER (OUTPATIENT)
Age: 53
Discharge: HOME | End: 2023-06-18
Payer: COMMERCIAL

## 2023-06-18 DIAGNOSIS — I48.0 PAROXYSMAL ATRIAL FIBRILLATION (HCC): ICD-10-CM

## 2023-06-18 LAB — INR PPP: 2.99 (ref 0.85–1.13)

## 2023-06-18 PROCEDURE — 36415 COLL VENOUS BLD VENIPUNCTURE: CPT

## 2023-06-18 PROCEDURE — 85610 PROTHROMBIN TIME: CPT

## 2023-07-02 ENCOUNTER — HOSPITAL ENCOUNTER (OUTPATIENT)
Age: 53
Discharge: HOME | End: 2023-07-02
Payer: COMMERCIAL

## 2023-07-02 ENCOUNTER — HOSPITAL ENCOUNTER (OUTPATIENT)
Age: 53
End: 2023-07-02

## 2023-07-02 DIAGNOSIS — I48.0 PAROXYSMAL ATRIAL FIBRILLATION (HCC): ICD-10-CM

## 2023-07-02 LAB — INR PPP: 2.4 (ref 0.85–1.13)

## 2023-07-02 PROCEDURE — 36415 COLL VENOUS BLD VENIPUNCTURE: CPT

## 2023-07-02 PROCEDURE — 85610 PROTHROMBIN TIME: CPT

## 2023-07-23 ENCOUNTER — HOSPITAL ENCOUNTER (OUTPATIENT)
Age: 53
Discharge: HOME | End: 2023-07-23
Payer: COMMERCIAL

## 2023-07-23 DIAGNOSIS — I48.0 PAROXYSMAL ATRIAL FIBRILLATION (HCC): ICD-10-CM

## 2023-07-23 LAB — INR PPP: 3.33 (ref 0.85–1.13)

## 2023-07-23 PROCEDURE — 36415 COLL VENOUS BLD VENIPUNCTURE: CPT

## 2023-07-23 PROCEDURE — 85610 PROTHROMBIN TIME: CPT

## 2023-07-24 ENCOUNTER — TELEPHONE (OUTPATIENT)
Dept: FAMILY MEDICINE CLINIC | Age: 53
End: 2023-07-24

## 2023-07-24 NOTE — TELEPHONE ENCOUNTER
----- Message from Stevie Lefort, MD sent at 7/24/2023  9:59 AM EDT -----  Please have Vasu Beltran keep same dosing for coumadin this week and repeat lab on Wednesday  Thanks

## 2023-07-24 NOTE — TELEPHONE ENCOUNTER
Patient notified however he wants me to confirm what day you want him to recheck INR. Vanesa Barron this wed or next week on wed?

## 2023-07-26 ENCOUNTER — HOSPITAL ENCOUNTER (OUTPATIENT)
Age: 53
Discharge: HOME | End: 2023-07-26
Payer: COMMERCIAL

## 2023-07-26 DIAGNOSIS — I48.0 PAROXYSMAL ATRIAL FIBRILLATION (HCC): ICD-10-CM

## 2023-07-26 LAB — INR PPP: 3.51 (ref 0.85–1.13)

## 2023-07-26 PROCEDURE — 85610 PROTHROMBIN TIME: CPT

## 2023-07-26 PROCEDURE — 36415 COLL VENOUS BLD VENIPUNCTURE: CPT

## 2023-07-27 ENCOUNTER — TELEPHONE (OUTPATIENT)
Dept: FAMILY MEDICINE CLINIC | Age: 53
End: 2023-07-27

## 2023-07-27 NOTE — TELEPHONE ENCOUNTER
----- Message from Tracey Hess MD sent at 7/27/2023  8:51 AM EDT -----  This is Dr. Juliane Polo answering for Dr. Delonte Renae as she is not in the office today. Please let pt know that INR is a little high. Please verify current dose of warfarin and any new meds or other changes that could affect his INR and send back to me for instructions. Thanks!

## 2023-07-27 NOTE — TELEPHONE ENCOUNTER
Thank you! I believe it is 10mg MWF and 12.5mg other days. He has 5mg tabs and uses 2 tabs MWF and 2.5 tabs other days. Have him use 12.5mg (2.5 tabs) T Th and 10mg other days - MWFSaSu. Needs INR rechecked in 1-2 wks please.

## 2023-07-27 NOTE — TELEPHONE ENCOUNTER
Current dose- 2 pills 10 mg on Monday, Wednesday and Friday, rest is 2.5 mg. Unsure of exactly how he takes, isn't home to check his paper, has been cutting back on food intake.  Hasn't changed any medications

## 2023-07-30 ENCOUNTER — HOSPITAL ENCOUNTER (OUTPATIENT)
Age: 53
Discharge: HOME | End: 2023-07-30
Payer: COMMERCIAL

## 2023-07-30 LAB — INR PPP: 2.13 (ref 0.85–1.13)

## 2023-07-30 PROCEDURE — 36415 COLL VENOUS BLD VENIPUNCTURE: CPT

## 2023-07-30 PROCEDURE — 85610 PROTHROMBIN TIME: CPT

## 2023-07-31 ENCOUNTER — TELEPHONE (OUTPATIENT)
Dept: FAMILY MEDICINE CLINIC | Age: 53
End: 2023-07-31

## 2023-07-31 NOTE — TELEPHONE ENCOUNTER
----- Message from Beverley Cancino MD sent at 7/31/2023  1:46 PM EDT -----  INR in range  Keep same dosing of warfarin  Recheck INR 1 week

## 2023-08-06 ENCOUNTER — HOSPITAL ENCOUNTER (OUTPATIENT)
Age: 53
Discharge: HOME | End: 2023-08-06
Payer: COMMERCIAL

## 2023-08-06 LAB — INR PPP: 3.48 (ref 0.85–1.13)

## 2023-08-06 PROCEDURE — 36415 COLL VENOUS BLD VENIPUNCTURE: CPT

## 2023-08-06 PROCEDURE — 85610 PROTHROMBIN TIME: CPT

## 2023-08-07 ENCOUNTER — TELEPHONE (OUTPATIENT)
Dept: FAMILY MEDICINE CLINIC | Age: 53
End: 2023-08-07

## 2023-08-07 NOTE — TELEPHONE ENCOUNTER
----- Message from Toro Tracy MD sent at 8/7/2023 10:13 AM EDT -----  Please have Matias Bhatt take 10 mg warfarin every day except 12.5 mg on Thursdays  Check INR 1 week  (Running a little high again today)

## 2023-08-07 NOTE — TELEPHONE ENCOUNTER
----- Message from Corby Vaca MD sent at 8/7/2023 10:13 AM EDT -----  Please have Tennis Forts take 10 mg warfarin every day except 12.5 mg on Thursdays  Check INR 1 week  (Running a little high again today)

## 2023-08-13 ENCOUNTER — HOSPITAL ENCOUNTER (OUTPATIENT)
Age: 53
Discharge: HOME | End: 2023-08-13
Payer: COMMERCIAL

## 2023-08-13 LAB — INR PPP: 3.67 (ref 0.85–1.13)

## 2023-08-13 PROCEDURE — 36415 COLL VENOUS BLD VENIPUNCTURE: CPT

## 2023-08-13 PROCEDURE — 85610 PROTHROMBIN TIME: CPT

## 2023-08-14 ENCOUNTER — TELEPHONE (OUTPATIENT)
Dept: FAMILY MEDICINE CLINIC | Age: 53
End: 2023-08-14

## 2023-08-14 NOTE — TELEPHONE ENCOUNTER
----- Message from Heather Alexander MD sent at 8/14/2023  8:55 AM EDT -----  Please notify Steven Tim we will plan to reduce his warfarin dose to 10 mg daily and recheck INR on Friday when he is in the office.

## 2023-08-14 NOTE — RESULT ENCOUNTER NOTE
Please notify Emeli Olsen we will plan to reduce his warfarin dose to 10 mg daily and recheck INR on Friday when he is in the office.

## 2023-08-18 ENCOUNTER — OFFICE VISIT (OUTPATIENT)
Dept: FAMILY MEDICINE CLINIC | Age: 53
End: 2023-08-18
Payer: COMMERCIAL

## 2023-08-18 ENCOUNTER — TELEPHONE (OUTPATIENT)
Dept: FAMILY MEDICINE CLINIC | Age: 53
End: 2023-08-18

## 2023-08-18 VITALS
HEART RATE: 74 BPM | DIASTOLIC BLOOD PRESSURE: 60 MMHG | HEIGHT: 73 IN | OXYGEN SATURATION: 96 % | SYSTOLIC BLOOD PRESSURE: 132 MMHG | WEIGHT: 315 LBS | BODY MASS INDEX: 41.75 KG/M2

## 2023-08-18 DIAGNOSIS — I10 PRIMARY HYPERTENSION: ICD-10-CM

## 2023-08-18 DIAGNOSIS — M75.101 ROTATOR CUFF SYNDROME OF RIGHT SHOULDER: ICD-10-CM

## 2023-08-18 DIAGNOSIS — E66.01 CLASS 3 SEVERE OBESITY DUE TO EXCESS CALORIES WITH SERIOUS COMORBIDITY AND BODY MASS INDEX (BMI) OF 45.0 TO 49.9 IN ADULT (HCC): ICD-10-CM

## 2023-08-18 DIAGNOSIS — I48.0 PAROXYSMAL ATRIAL FIBRILLATION (HCC): ICD-10-CM

## 2023-08-18 DIAGNOSIS — E87.5 HYPERKALEMIA: ICD-10-CM

## 2023-08-18 DIAGNOSIS — E11.9 TYPE 2 DIABETES MELLITUS WITHOUT COMPLICATION, WITHOUT LONG-TERM CURRENT USE OF INSULIN (HCC): Primary | ICD-10-CM

## 2023-08-18 LAB
INR PPP: 2.67 (ref 0.85–1.13)
POTASSIUM SERPL-SCNC: 5 MEQ/L (ref 3.5–5.2)

## 2023-08-18 PROCEDURE — 3078F DIAST BP <80 MM HG: CPT | Performed by: FAMILY MEDICINE

## 2023-08-18 PROCEDURE — 36415 COLL VENOUS BLD VENIPUNCTURE: CPT | Performed by: FAMILY MEDICINE

## 2023-08-18 PROCEDURE — 3044F HG A1C LEVEL LT 7.0%: CPT | Performed by: FAMILY MEDICINE

## 2023-08-18 PROCEDURE — 99214 OFFICE O/P EST MOD 30 MIN: CPT | Performed by: FAMILY MEDICINE

## 2023-08-18 PROCEDURE — 3075F SYST BP GE 130 - 139MM HG: CPT | Performed by: FAMILY MEDICINE

## 2023-08-18 RX ORDER — FLUTICASONE PROPIONATE AND SALMETEROL 250; 50 UG/1; UG/1
1 POWDER RESPIRATORY (INHALATION) 2 TIMES DAILY
Qty: 60 EACH | Refills: 11 | Status: SHIPPED | OUTPATIENT
Start: 2023-08-18

## 2023-08-18 ASSESSMENT — ENCOUNTER SYMPTOMS
SHORTNESS OF BREATH: 0
VOMITING: 0
SORE THROAT: 0
CONSTIPATION: 0
DIARRHEA: 0
TROUBLE SWALLOWING: 0
ABDOMINAL PAIN: 0
COUGH: 0

## 2023-08-18 NOTE — TELEPHONE ENCOUNTER
----- Message from Heather Alexander MD sent at 8/18/2023  1:10 PM EDT -----  Please notify Steven Ingramnaya his INR is in good range, keep taking 10 mg warfarin daily and recheck INR in 1 week (ok to do on the Monday morning like he has previously done)

## 2023-08-18 NOTE — TELEPHONE ENCOUNTER
Left detailed message with results but still asking patient to call to let us know he received the message.

## 2023-08-18 NOTE — PROGRESS NOTES
Venipuncture obtained from right arm. Patient tolerated the procedure without complications or complaints.
adjustment of warfarin over the last few months due to dietary changes and weight loss  Repeat INR today, current dose is 10 mg daily. Sees Dr. Heather Black in Sept for annual visit    - Segundo Fu; Future    3. Class 3 severe obesity due to excess calories with serious comorbidity and body mass index (BMI) of 45.0 to 49.9 in adult [facility])  Down 20 lbs since Feb, keep up the good work with diet and exercise  May consider adding GLP1 in future if weight loss plateaus    4. Primary hypertension  At goal, no change to valsartan 80 mg daily and lopressor 25 BID    5. Hyperkalemia  Repeat K+ today  - Potassium; Future    6.  Rotator cuff syndrome of right shoulder  HEP given, trial of voltaren gel, limit weight activities that trigger pain  Start PT if not improving with HEP over next 3-4 wks    Follow up 6 months           Stevie Lefort, MD  8:12 AM  8/18/23

## 2023-08-27 ENCOUNTER — HOSPITAL ENCOUNTER (OUTPATIENT)
Age: 53
Discharge: HOME | End: 2023-08-27
Payer: COMMERCIAL

## 2023-08-27 LAB — INR PPP: 2.51 (ref 0.85–1.13)

## 2023-08-27 PROCEDURE — 85610 PROTHROMBIN TIME: CPT

## 2023-08-27 PROCEDURE — 36415 COLL VENOUS BLD VENIPUNCTURE: CPT

## 2023-08-28 ENCOUNTER — TELEPHONE (OUTPATIENT)
Dept: FAMILY MEDICINE CLINIC | Age: 53
End: 2023-08-28

## 2023-08-28 NOTE — TELEPHONE ENCOUNTER
----- Message from Corby Vaca MD sent at 8/28/2023  8:25 AM EDT -----       Corby Vaca MD   8/28/2023  8:25 AM EDT Back to Top      INR in good range today  Please repeat INR in 2 wks

## 2023-09-07 ENCOUNTER — OFFICE VISIT (OUTPATIENT)
Dept: CARDIOLOGY CLINIC | Age: 53
End: 2023-09-07
Payer: COMMERCIAL

## 2023-09-07 VITALS
BODY MASS INDEX: 41.75 KG/M2 | DIASTOLIC BLOOD PRESSURE: 67 MMHG | SYSTOLIC BLOOD PRESSURE: 145 MMHG | HEART RATE: 60 BPM | HEIGHT: 73 IN | WEIGHT: 315 LBS

## 2023-09-07 DIAGNOSIS — I10 PRIMARY HYPERTENSION: ICD-10-CM

## 2023-09-07 DIAGNOSIS — I48.0 PAROXYSMAL ATRIAL FIBRILLATION (HCC): Primary | ICD-10-CM

## 2023-09-07 PROCEDURE — 3077F SYST BP >= 140 MM HG: CPT | Performed by: INTERNAL MEDICINE

## 2023-09-07 PROCEDURE — 99212 OFFICE O/P EST SF 10 MIN: CPT | Performed by: INTERNAL MEDICINE

## 2023-09-07 PROCEDURE — 3078F DIAST BP <80 MM HG: CPT | Performed by: INTERNAL MEDICINE

## 2023-09-07 PROCEDURE — 93000 ELECTROCARDIOGRAM COMPLETE: CPT | Performed by: INTERNAL MEDICINE

## 2023-09-10 ENCOUNTER — HOSPITAL ENCOUNTER (OUTPATIENT)
Age: 53
Discharge: HOME | End: 2023-09-10
Payer: COMMERCIAL

## 2023-09-10 LAB — INR PPP: 2.03 (ref 0.85–1.13)

## 2023-09-10 PROCEDURE — 36415 COLL VENOUS BLD VENIPUNCTURE: CPT

## 2023-09-10 PROCEDURE — 85610 PROTHROMBIN TIME: CPT

## 2023-09-24 ENCOUNTER — HOSPITAL ENCOUNTER (OUTPATIENT)
Age: 53
Discharge: HOME | End: 2023-09-24
Payer: COMMERCIAL

## 2023-09-24 LAB — INR PPP: 2.11 (ref 0.85–1.13)

## 2023-09-24 PROCEDURE — 36415 COLL VENOUS BLD VENIPUNCTURE: CPT

## 2023-09-24 PROCEDURE — 85610 PROTHROMBIN TIME: CPT

## 2023-09-25 ENCOUNTER — TELEPHONE (OUTPATIENT)
Dept: FAMILY MEDICINE CLINIC | Age: 53
End: 2023-09-25

## 2023-09-25 NOTE — TELEPHONE ENCOUNTER
----- Message from Johnnye Lundborg, MD sent at 9/25/2023 11:12 AM EDT -----  Regarding: INR  Please notify Steve Caceres his INR is in goal range again- keep same dosing of medication and repeat labs in 4 wks.   ----- Message -----  From: [facility] Incoming Lab Results From Soft  Sent: 9/24/2023  12:11 PM EDT  To: Johnnye Lundborg, MD

## 2023-10-22 ENCOUNTER — HOSPITAL ENCOUNTER (OUTPATIENT)
Age: 53
Discharge: HOME | End: 2023-10-22
Payer: COMMERCIAL

## 2023-10-22 LAB — INR PPP: 2.33 (ref 0.85–1.13)

## 2023-10-22 PROCEDURE — 36415 COLL VENOUS BLD VENIPUNCTURE: CPT

## 2023-10-22 PROCEDURE — 85610 PROTHROMBIN TIME: CPT

## 2023-11-12 ENCOUNTER — HOSPITAL ENCOUNTER (OUTPATIENT)
Age: 53
Discharge: HOME | End: 2023-11-12
Payer: COMMERCIAL

## 2023-11-12 LAB — INR PPP: 2.36 (ref 0.85–1.13)

## 2023-11-12 PROCEDURE — 36415 COLL VENOUS BLD VENIPUNCTURE: CPT

## 2023-11-12 PROCEDURE — 85610 PROTHROMBIN TIME: CPT

## 2023-11-13 NOTE — RESULT ENCOUNTER NOTE
Please notify Davonte Owusuing his INR is in goal range, no change to warfarin dosing and recheck INR in 1 month please

## 2023-12-03 ENCOUNTER — HOSPITAL ENCOUNTER (OUTPATIENT)
Age: 53
Discharge: HOME | End: 2023-12-03
Payer: COMMERCIAL

## 2023-12-03 LAB — INR PPP: 1.91 (ref 0.85–1.13)

## 2023-12-03 PROCEDURE — 36415 COLL VENOUS BLD VENIPUNCTURE: CPT

## 2023-12-03 PROCEDURE — 85610 PROTHROMBIN TIME: CPT

## 2023-12-08 ENCOUNTER — HOSPITAL ENCOUNTER (OUTPATIENT)
Age: 53
Discharge: HOME | End: 2023-12-08
Payer: COMMERCIAL

## 2023-12-08 LAB — INR BLD: 2.3 (ref 0.85–1.13)

## 2023-12-08 PROCEDURE — 36415 COLL VENOUS BLD VENIPUNCTURE: CPT

## 2023-12-08 PROCEDURE — 85610 PROTHROMBIN TIME: CPT

## 2023-12-24 ENCOUNTER — HOSPITAL ENCOUNTER (OUTPATIENT)
Age: 53
Discharge: HOME | End: 2023-12-24
Payer: COMMERCIAL

## 2023-12-24 LAB — INR BLD: 3.09 (ref 0.85–1.13)

## 2023-12-24 PROCEDURE — 85610 PROTHROMBIN TIME: CPT

## 2023-12-24 PROCEDURE — 36415 COLL VENOUS BLD VENIPUNCTURE: CPT

## 2023-12-31 ENCOUNTER — HOSPITAL ENCOUNTER (OUTPATIENT)
Age: 53
Discharge: HOME | End: 2023-12-31
Payer: COMMERCIAL

## 2023-12-31 LAB — INR PPP: 3.64 (ref 0.85–1.13)

## 2023-12-31 PROCEDURE — 36415 COLL VENOUS BLD VENIPUNCTURE: CPT

## 2023-12-31 PROCEDURE — 85610 PROTHROMBIN TIME: CPT

## 2024-01-02 NOTE — RESULT ENCOUNTER NOTE
INR continues a little above goal.  Please confirm is he taking 10 mg warfarin daily.  If yes- reduce today and tomorrow dose to 5 mg and then resume 10 mg dosing on Thursday.  Repeat INR on Monday

## 2024-01-14 ENCOUNTER — HOSPITAL ENCOUNTER (OUTPATIENT)
Age: 54
Discharge: HOME | End: 2024-01-14
Payer: COMMERCIAL

## 2024-01-14 LAB — INR PPP: 3.11 (ref 0.85–1.13)

## 2024-01-14 PROCEDURE — 36415 COLL VENOUS BLD VENIPUNCTURE: CPT

## 2024-01-14 PROCEDURE — 85610 PROTHROMBIN TIME: CPT

## 2024-01-15 NOTE — RESULT ENCOUNTER NOTE
Please let Luis know his INR is improved from last check- very slightly above 3  Keep watching diet, same dose of warfarin  Recheck INR 2 wk

## 2024-01-28 ENCOUNTER — HOSPITAL ENCOUNTER (OUTPATIENT)
Age: 54
Discharge: HOME | End: 2024-01-28
Payer: COMMERCIAL

## 2024-01-28 LAB — INR PPP: 3.07 (ref 0.85–1.13)

## 2024-01-28 PROCEDURE — 85610 PROTHROMBIN TIME: CPT

## 2024-01-28 PROCEDURE — 36415 COLL VENOUS BLD VENIPUNCTURE: CPT

## 2024-01-31 DIAGNOSIS — I48.0 PAROXYSMAL ATRIAL FIBRILLATION (HCC): Primary | ICD-10-CM

## 2024-01-31 DIAGNOSIS — Z86.711 PERSONAL HISTORY OF PULMONARY EMBOLISM: ICD-10-CM

## 2024-02-11 ENCOUNTER — HOSPITAL ENCOUNTER (OUTPATIENT)
Age: 54
Discharge: HOME | End: 2024-02-11
Payer: COMMERCIAL

## 2024-02-11 DIAGNOSIS — I48.0 PAROXYSMAL ATRIAL FIBRILLATION (HCC): ICD-10-CM

## 2024-02-11 DIAGNOSIS — Z86.711 PERSONAL HISTORY OF PULMONARY EMBOLISM: ICD-10-CM

## 2024-02-11 LAB — INR PPP: 4.06 (ref 0.85–1.13)

## 2024-02-11 PROCEDURE — 36415 COLL VENOUS BLD VENIPUNCTURE: CPT

## 2024-02-11 PROCEDURE — 85610 PROTHROMBIN TIME: CPT

## 2024-02-12 RX ORDER — WARFARIN SODIUM 5 MG/1
5 TABLET ORAL DAILY
Qty: 270 TABLET | Refills: 3
Start: 2024-02-12

## 2024-02-12 NOTE — RESULT ENCOUNTER NOTE
Hold warfarin today, take 1 tablet on Tuesday and resume 2 full tablet on Wednesday- recheck INR on Friday morning this week.  Please confirm he has been taking 10 mg daily up until this point and route this information back to me.  Thank you

## 2024-02-14 RX ORDER — ESCITALOPRAM OXALATE 20 MG/1
20 TABLET ORAL DAILY
Qty: 90 TABLET | Refills: 3 | Status: SHIPPED | OUTPATIENT
Start: 2024-02-14

## 2024-02-14 NOTE — TELEPHONE ENCOUNTER
Last appointment this department: 8/18/2023  Next appointment this department: Visit date not found

## 2024-02-15 ENCOUNTER — HOSPITAL ENCOUNTER (OUTPATIENT)
Age: 54
Discharge: HOME | End: 2024-02-15
Payer: COMMERCIAL

## 2024-02-15 DIAGNOSIS — Z86.711 PERSONAL HISTORY OF PULMONARY EMBOLISM: ICD-10-CM

## 2024-02-15 DIAGNOSIS — I48.0 PAROXYSMAL ATRIAL FIBRILLATION (HCC): ICD-10-CM

## 2024-02-15 LAB — INR BLD: 4.59 (ref 0.85–1.13)

## 2024-02-15 PROCEDURE — 85610 PROTHROMBIN TIME: CPT

## 2024-02-15 PROCEDURE — 36415 COLL VENOUS BLD VENIPUNCTURE: CPT

## 2024-02-17 ENCOUNTER — HOSPITAL ENCOUNTER (OUTPATIENT)
Age: 54
Discharge: HOME | End: 2024-02-17
Payer: COMMERCIAL

## 2024-02-17 DIAGNOSIS — I48.0 PAROXYSMAL ATRIAL FIBRILLATION (HCC): ICD-10-CM

## 2024-02-17 DIAGNOSIS — Z86.711 PERSONAL HISTORY OF PULMONARY EMBOLISM: ICD-10-CM

## 2024-02-17 LAB — INR BLD: 2.32 (ref 0.85–1.13)

## 2024-02-17 PROCEDURE — 36415 COLL VENOUS BLD VENIPUNCTURE: CPT

## 2024-02-17 PROCEDURE — 85610 PROTHROMBIN TIME: CPT

## 2024-02-19 ENCOUNTER — TELEPHONE (OUTPATIENT)
Dept: FAMILY MEDICINE CLINIC | Age: 54
End: 2024-02-19

## 2024-02-19 ASSESSMENT — PATIENT HEALTH QUESTIONNAIRE - PHQ9
2. FEELING DOWN, DEPRESSED OR HOPELESS: NOT AT ALL
SUM OF ALL RESPONSES TO PHQ QUESTIONS 1-9: 0
SUM OF ALL RESPONSES TO PHQ QUESTIONS 1-9: 0
1. LITTLE INTEREST OR PLEASURE IN DOING THINGS: 0
SUM OF ALL RESPONSES TO PHQ QUESTIONS 1-9: 0
1. LITTLE INTEREST OR PLEASURE IN DOING THINGS: NOT AT ALL
2. FEELING DOWN, DEPRESSED OR HOPELESS: 0
SUM OF ALL RESPONSES TO PHQ9 QUESTIONS 1 & 2: 0
SUM OF ALL RESPONSES TO PHQ QUESTIONS 1-9: 0
SUM OF ALL RESPONSES TO PHQ9 QUESTIONS 1 & 2: 0

## 2024-02-19 NOTE — TELEPHONE ENCOUNTER
I do not see my reply after Lk's answer on result note from 2/15/24.  I believe I instructed pt to hold warfarin 2/15/24, take 1/2 tab (5mg) Friday and 1 tab daily Saturday and Sunday.  I'm not sure where that note is.  Please let him know that his INR is at goal today.  He should continue taking 1 10mg tablet of warfarin daily.  Recheck INR 1 wk since there has been so much variation.  Thanks!  REID

## 2024-02-19 NOTE — TELEPHONE ENCOUNTER
Patient notified. He stated he will continue taking the 2 5mg tablets to equal 10 mg daily. Following up with Dr. Leopold tomorrow.

## 2024-02-20 ENCOUNTER — OFFICE VISIT (OUTPATIENT)
Dept: FAMILY MEDICINE CLINIC | Age: 54
End: 2024-02-20
Payer: COMMERCIAL

## 2024-02-20 VITALS
DIASTOLIC BLOOD PRESSURE: 78 MMHG | WEIGHT: 315 LBS | OXYGEN SATURATION: 96 % | BODY MASS INDEX: 41.75 KG/M2 | HEART RATE: 76 BPM | HEIGHT: 73 IN | SYSTOLIC BLOOD PRESSURE: 132 MMHG

## 2024-02-20 DIAGNOSIS — E87.5 HYPERKALEMIA: ICD-10-CM

## 2024-02-20 DIAGNOSIS — I10 PRIMARY HYPERTENSION: ICD-10-CM

## 2024-02-20 DIAGNOSIS — I48.0 PAROXYSMAL ATRIAL FIBRILLATION (HCC): ICD-10-CM

## 2024-02-20 DIAGNOSIS — E11.9 TYPE 2 DIABETES MELLITUS WITHOUT COMPLICATION, WITHOUT LONG-TERM CURRENT USE OF INSULIN (HCC): Primary | ICD-10-CM

## 2024-02-20 DIAGNOSIS — E66.01 CLASS 3 SEVERE OBESITY DUE TO EXCESS CALORIES WITH SERIOUS COMORBIDITY AND BODY MASS INDEX (BMI) OF 45.0 TO 49.9 IN ADULT (HCC): ICD-10-CM

## 2024-02-20 DIAGNOSIS — Z11.59 ENCOUNTER FOR HEPATITIS C SCREENING TEST FOR LOW RISK PATIENT: ICD-10-CM

## 2024-02-20 LAB — HBA1C MFR BLD: 6.4 %

## 2024-02-20 PROCEDURE — 3078F DIAST BP <80 MM HG: CPT | Performed by: FAMILY MEDICINE

## 2024-02-20 PROCEDURE — 83036 HEMOGLOBIN GLYCOSYLATED A1C: CPT | Performed by: FAMILY MEDICINE

## 2024-02-20 PROCEDURE — 99214 OFFICE O/P EST MOD 30 MIN: CPT | Performed by: FAMILY MEDICINE

## 2024-02-20 PROCEDURE — 3044F HG A1C LEVEL LT 7.0%: CPT | Performed by: FAMILY MEDICINE

## 2024-02-20 PROCEDURE — 3075F SYST BP GE 130 - 139MM HG: CPT | Performed by: FAMILY MEDICINE

## 2024-02-20 RX ORDER — WARFARIN SODIUM 5 MG/1
10 TABLET ORAL DAILY
Qty: 180 TABLET | Refills: 3
Start: 2024-02-20

## 2024-02-20 SDOH — ECONOMIC STABILITY: INCOME INSECURITY: HOW HARD IS IT FOR YOU TO PAY FOR THE VERY BASICS LIKE FOOD, HOUSING, MEDICAL CARE, AND HEATING?: NOT HARD AT ALL

## 2024-02-20 SDOH — ECONOMIC STABILITY: FOOD INSECURITY: WITHIN THE PAST 12 MONTHS, THE FOOD YOU BOUGHT JUST DIDN'T LAST AND YOU DIDN'T HAVE MONEY TO GET MORE.: NEVER TRUE

## 2024-02-20 SDOH — ECONOMIC STABILITY: FOOD INSECURITY: WITHIN THE PAST 12 MONTHS, YOU WORRIED THAT YOUR FOOD WOULD RUN OUT BEFORE YOU GOT MONEY TO BUY MORE.: NEVER TRUE

## 2024-02-20 ASSESSMENT — ENCOUNTER SYMPTOMS
VOMITING: 0
ABDOMINAL PAIN: 0
CONSTIPATION: 0
TROUBLE SWALLOWING: 0
DIARRHEA: 0
SORE THROAT: 0
SHORTNESS OF BREATH: 0
COUGH: 0

## 2024-02-20 NOTE — PROGRESS NOTES
[facility] [place] [facility]  [address]  [place] [address]  Dept: [phone]     SUBJECTIVE     Luis Palmer is a 53 y.o.male    Pt presents for follow up of medications.     Pt feeling ok since last visit- interval history and any new issues noted below:     Going to the gym, weight lifting  Was taking an OTC supplement which he thinks interfered with INR- has since stopped and INR has normalized  He is eating a lower carb diet    Patient Active Problem List   Diagnosis    PSVT (paroxysmal supraventricular tachycardia)    Hypertension    GAETANO (obstructive sleep apnea)    Type 2 diabetes mellitus without complication, without long-term current use of insulin ([facility])    History of tobacco use disorder    Macrocytosis without anemia    Personal history of pulmonary embolism    Anticoagulated on Coumadin    Paroxysmal atrial fibrillation ([facility])    Class 3 severe obesity due to excess calories with serious comorbidity and body mass index (BMI) of 45.0 to 49.9 in adult ([facility])    Generalized anxiety disorder       Current Outpatient Medications   Medication Sig Dispense Refill    warfarin (COUMADIN) 5 MG tablet Take 2 tablets by mouth daily 180 tablet 3    escitalopram (LEXAPRO) 20 MG tablet Take 1 tablet by mouth once daily 90 tablet 3    metoprolol tartrate (LOPRESSOR) 25 MG tablet Take 1 tablet by mouth twice daily 180 tablet 3    fluticasone-salmeterol (ADVAIR) 250-50 MCG/ACT AEPB diskus inhaler Inhale 1 puff into the lungs 2 times daily 60 each 11    busPIRone (BUSPAR) 10 MG tablet Take 1 tablet by mouth 2 times daily      albuterol sulfate HFA (PROVENTIL;VENTOLIN;PROAIR) 108 (90 Base) MCG/ACT inhaler Inhale 2 puffs into the lungs every 6 hours as needed for Wheezing or Shortness of Breath 18 g 3    simvastatin (ZOCOR) 20 MG tablet Take 1 tablet by mouth daily 90 tablet 3    valsartan (DIOVAN) 80 MG tablet Take 1 tablet by mouth daily 90 tablet 3    metFORMIN (GLUCOPHAGE) 500 MG

## 2024-02-24 ENCOUNTER — TELEPHONE (OUTPATIENT)
Dept: FAMILY MEDICINE CLINIC | Age: 54
End: 2024-02-24

## 2024-02-24 ENCOUNTER — HOSPITAL ENCOUNTER (OUTPATIENT)
Age: 54
Discharge: HOME | End: 2024-02-24
Payer: COMMERCIAL

## 2024-02-24 DIAGNOSIS — Z86.711 PERSONAL HISTORY OF PULMONARY EMBOLISM: ICD-10-CM

## 2024-02-24 DIAGNOSIS — I48.0 PAROXYSMAL ATRIAL FIBRILLATION (HCC): ICD-10-CM

## 2024-02-24 DIAGNOSIS — Z79.01 CHRONIC ANTICOAGULATION: Primary | ICD-10-CM

## 2024-02-24 LAB — INR PPP: 5.76 (ref 0.85–1.13)

## 2024-02-24 PROCEDURE — 36415 COLL VENOUS BLD VENIPUNCTURE: CPT

## 2024-02-24 PROCEDURE — 85610 PROTHROMBIN TIME: CPT

## 2024-02-24 NOTE — TELEPHONE ENCOUNTER
Received notification from lab INR over 5, notified Dr Leopold Pt notified to hold warfarin today and tomorrow and repeat INR Monday , pt voiced understanding.

## 2024-02-26 ENCOUNTER — HOSPITAL ENCOUNTER (OUTPATIENT)
Age: 54
Discharge: HOME | End: 2024-02-26
Payer: COMMERCIAL

## 2024-02-26 ENCOUNTER — TELEPHONE (OUTPATIENT)
Dept: FAMILY MEDICINE CLINIC | Age: 54
End: 2024-02-26

## 2024-02-26 DIAGNOSIS — Z79.01 CHRONIC ANTICOAGULATION: ICD-10-CM

## 2024-02-26 LAB — INR BLD: 2.22 (ref 0.85–1.13)

## 2024-02-26 PROCEDURE — 36415 COLL VENOUS BLD VENIPUNCTURE: CPT

## 2024-02-26 PROCEDURE — 85610 PROTHROMBIN TIME: CPT

## 2024-02-26 RX ORDER — VALSARTAN 80 MG/1
80 TABLET ORAL DAILY
Qty: 90 TABLET | Refills: 3 | Status: SHIPPED | OUTPATIENT
Start: 2024-02-26

## 2024-02-26 NOTE — TELEPHONE ENCOUNTER
Called pt - left message for today's dose and told him office would call tomorrow to review the instructions below:    Decrease warfarin to 7.5mg daily (1.5 tabs of the 5mg strength) and recheck INR Friday.

## 2024-02-29 RX ORDER — SIMVASTATIN 20 MG
20 TABLET ORAL DAILY
Qty: 30 TABLET | Refills: 2 | Status: SHIPPED | OUTPATIENT
Start: 2024-02-29

## 2024-02-29 RX ORDER — BUSPIRONE HYDROCHLORIDE 10 MG/1
10 TABLET ORAL 2 TIMES DAILY
Qty: 60 TABLET | Refills: 2 | Status: SHIPPED | OUTPATIENT
Start: 2024-02-29

## 2024-03-01 ENCOUNTER — HOSPITAL ENCOUNTER (OUTPATIENT)
Age: 54
Discharge: HOME | End: 2024-03-01
Payer: COMMERCIAL

## 2024-03-01 DIAGNOSIS — Z86.711 PERSONAL HISTORY OF PULMONARY EMBOLISM: ICD-10-CM

## 2024-03-01 DIAGNOSIS — I48.0 PAROXYSMAL ATRIAL FIBRILLATION (HCC): ICD-10-CM

## 2024-03-01 LAB — INR BLD: 3.45 (ref 0.85–1.13)

## 2024-03-01 PROCEDURE — 85610 PROTHROMBIN TIME: CPT

## 2024-03-01 PROCEDURE — 36415 COLL VENOUS BLD VENIPUNCTURE: CPT

## 2024-03-04 ENCOUNTER — HOSPITAL ENCOUNTER (OUTPATIENT)
Age: 54
Discharge: HOME | End: 2024-03-04
Payer: COMMERCIAL

## 2024-03-04 DIAGNOSIS — Z86.711 PERSONAL HISTORY OF PULMONARY EMBOLISM: ICD-10-CM

## 2024-03-04 DIAGNOSIS — I48.0 PAROXYSMAL ATRIAL FIBRILLATION (HCC): ICD-10-CM

## 2024-03-04 LAB — INR BLD: 1.96 (ref 0.85–1.13)

## 2024-03-04 PROCEDURE — 36415 COLL VENOUS BLD VENIPUNCTURE: CPT

## 2024-03-04 PROCEDURE — 85610 PROTHROMBIN TIME: CPT

## 2024-03-04 RX ORDER — WARFARIN SODIUM 5 MG/1
10 TABLET ORAL DAILY
Qty: 180 TABLET | Refills: 3 | Status: SHIPPED | OUTPATIENT
Start: 2024-03-04

## 2024-03-04 NOTE — RESULT ENCOUNTER NOTE
INR improved today  Please follow following dosing- 10 mg all days except 7.5 mg on Tuesday/Thursdays (1.5 tablets)  Recheck INR on Monday  Please update sig on med list for warfarin

## 2024-03-08 ENCOUNTER — HOSPITAL ENCOUNTER (OUTPATIENT)
Age: 54
Discharge: HOME | End: 2024-03-08
Payer: COMMERCIAL

## 2024-03-08 DIAGNOSIS — Z86.711 PERSONAL HISTORY OF PULMONARY EMBOLISM: ICD-10-CM

## 2024-03-08 DIAGNOSIS — I48.0 PAROXYSMAL ATRIAL FIBRILLATION (HCC): ICD-10-CM

## 2024-03-08 LAB — INR BLD: 2.78 (ref 0.85–1.13)

## 2024-03-08 PROCEDURE — 36415 COLL VENOUS BLD VENIPUNCTURE: CPT

## 2024-03-08 PROCEDURE — 85610 PROTHROMBIN TIME: CPT

## 2024-03-14 ENCOUNTER — ANTI-COAG VISIT (OUTPATIENT)
Dept: FAMILY MEDICINE CLINIC | Age: 54
End: 2024-03-14
Payer: COMMERCIAL

## 2024-03-14 ENCOUNTER — HOSPITAL ENCOUNTER (OUTPATIENT)
Age: 54
Discharge: HOME | End: 2024-03-14
Payer: COMMERCIAL

## 2024-03-14 DIAGNOSIS — I48.0 PAROXYSMAL ATRIAL FIBRILLATION (HCC): ICD-10-CM

## 2024-03-14 DIAGNOSIS — Z79.01 ANTICOAGULATED ON COUMADIN: Primary | ICD-10-CM

## 2024-03-14 DIAGNOSIS — Z86.711 PERSONAL HISTORY OF PULMONARY EMBOLISM: ICD-10-CM

## 2024-03-14 LAB — INR BLD: 3.46 (ref 0.85–1.13)

## 2024-03-14 PROCEDURE — 85610 PROTHROMBIN TIME: CPT

## 2024-03-14 PROCEDURE — 93793 ANTICOAG MGMT PT WARFARIN: CPT | Performed by: FAMILY MEDICINE

## 2024-03-14 PROCEDURE — 36415 COLL VENOUS BLD VENIPUNCTURE: CPT

## 2024-03-14 RX ORDER — WARFARIN SODIUM 5 MG/1
TABLET ORAL
Qty: 180 TABLET | Refills: 0 | Status: ON HOLD | OUTPATIENT
Start: 2024-03-14 | End: 2024-05-07 | Stop reason: HOSPADM

## 2024-03-14 NOTE — PROGRESS NOTES
Patient notified of the message below.     Ellen Walls MD  [facility] Clinical Staff  Please let pt know that his INR is a bit high again.  According to chart, he is currently taking the 5mg dose of coumadin - 10mg (2 tablets) M,W,F,Sat,Sun and 7.5mg (1.5 tablets) Tuesday and Thursday.  Please ask him to take the 7.5mg (1.5 tablets) M,W,F and the 10mg (2 tablets) T, Th, Sat and Sunday.  He should have the INR rechecked in 1-2 weeks.

## 2024-03-21 ENCOUNTER — HOSPITAL ENCOUNTER (OUTPATIENT)
Age: 54
Discharge: HOME | End: 2024-03-21
Payer: COMMERCIAL

## 2024-03-21 DIAGNOSIS — I48.0 PAROXYSMAL ATRIAL FIBRILLATION (HCC): ICD-10-CM

## 2024-03-21 DIAGNOSIS — Z86.711 PERSONAL HISTORY OF PULMONARY EMBOLISM: ICD-10-CM

## 2024-03-21 LAB — INR BLD: 1.49 (ref 0.85–1.13)

## 2024-03-21 PROCEDURE — 36415 COLL VENOUS BLD VENIPUNCTURE: CPT

## 2024-03-21 PROCEDURE — 85610 PROTHROMBIN TIME: CPT

## 2024-03-21 NOTE — RESULT ENCOUNTER NOTE
INR low- please have him go back to the 7.5 mg Tuesday/Thursday and 10 mg MWF,Sat/sun.  Recheck INR on Monday

## 2024-03-25 ENCOUNTER — HOSPITAL ENCOUNTER (OUTPATIENT)
Age: 54
Discharge: HOME | End: 2024-03-25
Payer: COMMERCIAL

## 2024-03-25 DIAGNOSIS — I48.0 PAROXYSMAL ATRIAL FIBRILLATION (HCC): ICD-10-CM

## 2024-03-25 DIAGNOSIS — Z86.711 PERSONAL HISTORY OF PULMONARY EMBOLISM: ICD-10-CM

## 2024-03-25 LAB — INR BLD: 2.32 (ref 0.85–1.13)

## 2024-03-25 PROCEDURE — 85610 PROTHROMBIN TIME: CPT

## 2024-03-25 PROCEDURE — 36415 COLL VENOUS BLD VENIPUNCTURE: CPT

## 2024-03-31 ENCOUNTER — HOSPITAL ENCOUNTER (OUTPATIENT)
Age: 54
Discharge: HOME | End: 2024-03-31
Payer: COMMERCIAL

## 2024-03-31 DIAGNOSIS — I48.0 PAROXYSMAL ATRIAL FIBRILLATION (HCC): ICD-10-CM

## 2024-03-31 DIAGNOSIS — Z86.711 PERSONAL HISTORY OF PULMONARY EMBOLISM: ICD-10-CM

## 2024-03-31 LAB — INR BLD: 2.28 (ref 0.85–1.13)

## 2024-03-31 PROCEDURE — 85610 PROTHROMBIN TIME: CPT

## 2024-03-31 PROCEDURE — 36415 COLL VENOUS BLD VENIPUNCTURE: CPT

## 2024-04-13 ENCOUNTER — HOSPITAL ENCOUNTER (OUTPATIENT)
Age: 54
Discharge: HOME | End: 2024-04-13
Payer: COMMERCIAL

## 2024-04-13 DIAGNOSIS — Z86.711 PERSONAL HISTORY OF PULMONARY EMBOLISM: ICD-10-CM

## 2024-04-13 DIAGNOSIS — I48.0 PAROXYSMAL ATRIAL FIBRILLATION (HCC): ICD-10-CM

## 2024-04-13 LAB — INR PPP: 1.71 (ref 0.85–1.13)

## 2024-04-13 PROCEDURE — 85610 PROTHROMBIN TIME: CPT

## 2024-04-13 PROCEDURE — 36415 COLL VENOUS BLD VENIPUNCTURE: CPT

## 2024-04-15 ENCOUNTER — ANTI-COAG VISIT (OUTPATIENT)
Dept: FAMILY MEDICINE CLINIC | Age: 54
End: 2024-04-15
Payer: COMMERCIAL

## 2024-04-15 DIAGNOSIS — I48.0 PAROXYSMAL ATRIAL FIBRILLATION (HCC): Primary | ICD-10-CM

## 2024-04-15 PROCEDURE — 93793 ANTICOAG MGMT PT WARFARIN: CPT | Performed by: FAMILY MEDICINE

## 2024-04-15 NOTE — PROGRESS NOTES
Maribel Cisneros MA  4/15/2024 10:40 AM EDT Back to Top      Called patient and informed him of recommendations.    Katelyn Ann Leopold, MD  4/15/2024  8:51 AM EDT       Please have Luis take 10 mg every day this week except for Thursday should take 7.5 mg as usual  Repeat INR next Monday

## 2024-04-15 NOTE — RESULT ENCOUNTER NOTE
Please have Luis take 10 mg every day this week except for Thursday should take 7.5 mg as usual  Repeat INR next Monday

## 2024-04-21 ENCOUNTER — HOSPITAL ENCOUNTER (OUTPATIENT)
Age: 54
Discharge: HOME | End: 2024-04-21
Payer: COMMERCIAL

## 2024-04-21 DIAGNOSIS — I48.0 PAROXYSMAL ATRIAL FIBRILLATION (HCC): ICD-10-CM

## 2024-04-21 DIAGNOSIS — Z86.711 PERSONAL HISTORY OF PULMONARY EMBOLISM: ICD-10-CM

## 2024-04-21 LAB — INR PPP: 2.18 (ref 0.85–1.13)

## 2024-04-21 PROCEDURE — 36415 COLL VENOUS BLD VENIPUNCTURE: CPT

## 2024-04-21 PROCEDURE — 85610 PROTHROMBIN TIME: CPT

## 2024-04-28 ENCOUNTER — HOSPITAL ENCOUNTER (OUTPATIENT)
Age: 54
Discharge: HOME | End: 2024-04-28
Payer: COMMERCIAL

## 2024-04-28 DIAGNOSIS — I48.0 PAROXYSMAL ATRIAL FIBRILLATION (HCC): ICD-10-CM

## 2024-04-28 DIAGNOSIS — Z86.711 PERSONAL HISTORY OF PULMONARY EMBOLISM: ICD-10-CM

## 2024-04-28 LAB — INR BLD: 2.7 (ref 0.85–1.13)

## 2024-04-28 PROCEDURE — 36415 COLL VENOUS BLD VENIPUNCTURE: CPT

## 2024-04-28 PROCEDURE — 85610 PROTHROMBIN TIME: CPT

## 2024-05-02 ENCOUNTER — HOSPITAL ENCOUNTER (INPATIENT)
Age: 54
LOS: 5 days | Discharge: HOME | DRG: 309 | End: 2024-05-07
Attending: EMERGENCY MEDICINE | Admitting: HOSPITALIST
Payer: COMMERCIAL

## 2024-05-02 ENCOUNTER — APPOINTMENT (OUTPATIENT)
Dept: GENERAL RADIOLOGY | Age: 54
DRG: 309 | End: 2024-05-02
Payer: COMMERCIAL

## 2024-05-02 DIAGNOSIS — Z79.01 ANTICOAGULATED ON COUMADIN: ICD-10-CM

## 2024-05-02 DIAGNOSIS — I48.91 ATRIAL FIBRILLATION WITH RVR (HCC): Primary | ICD-10-CM

## 2024-05-02 DIAGNOSIS — I48.0 PAROXYSMAL ATRIAL FIBRILLATION (HCC): ICD-10-CM

## 2024-05-02 LAB
ALBUMIN SERPL BCP-MCNC: 3.6 GM/DL (ref 3.4–5)
ALP SERPL-CCNC: 87 U/L (ref 46–116)
ALT SERPL W P-5'-P-CCNC: 40 U/L (ref 14–63)
ANION GAP SERPL CALC-SCNC: 7 MEQ/L (ref 8–16)
APTT: 33.3 SECONDS (ref 22–38)
AST SERPL W P-5'-P-CCNC: 31 U/L (ref 15–37)
BASOPHILS # BLD: 0.3 % (ref 0–3)
BASOPHILS ABSOLUTE: 0 THOU/MM3 (ref 0–0.1)
BILIRUB SERPL-MCNC: 0.5 MG/DL (ref 0.2–1)
BUN SERPL-MCNC: 25 MG/DL (ref 7–18)
CALCIUM SERPL-MCNC: 9.2 MG/DL (ref 8.5–10.1)
CHLORIDE SERPL-SCNC: 103 MEQ/L (ref 98–107)
CO2 SERPL-SCNC: 30 MEQ/L (ref 21–32)
CREAT SERPL-MCNC: 1.1 MG/DL (ref 0.6–1.3)
EOSINOPHILS ABSOLUTE: 0.2 THOU/MM3 (ref 0–0.5)
EOSINOPHILS RELATIVE PERCENT: 2 % (ref 0–4)
GFR SERPL CREATININE-BSD FRML MDRD: 80 ML/MIN/1.73M2
GLUCOSE SERPL-MCNC: 126 MG/DL (ref 74–106)
HCT VFR BLD CALC: 52.1 % (ref 42–52)
HEMOGLOBIN: 16.4 GM/DL (ref 14–18)
IMMATURE GRANS (ABS): 0 THOU/MM3 (ref 0–0.07)
IMMATURE GRANULOCYTES %: 0 %
INR BLD: 1.86 (ref 0.85–1.13)
LYMPHOCYTES # BLD AUTO: 16.3 % (ref 15–47)
LYMPHOCYTES ABSOLUTE: 1.5 THOU/MM3 (ref 1–4.8)
MCH RBC QN AUTO: 28.2 PG (ref 26–32)
MCHC RBC AUTO-ENTMCNC: 31.5 GM/DL (ref 31–35)
MCV RBC AUTO: 89.7 FL (ref 80–94)
MONOCYTES: 0.8 THOU/MM3 (ref 0.3–1.3)
MONOCYTES: 8.8 % (ref 0–12)
NEUTROPHILS ABSOLUTE: 6.8 THOU/MM3 (ref 1.8–7.7)
NT PRO BNP: 119 PG/ML (ref 0–900)
PDW BLD-RTO: 16.2 % (ref 11.5–14.9)
PLATELET # BLD AUTO: 193 THOU/MM3 (ref 130–400)
PMV BLD AUTO: 9.1 FL (ref 9.4–12.4)
POTASSIUM SERPL-SCNC: 4.2 MEQ/L (ref 3.5–5.1)
PROT SERPL-MCNC: 7.6 GM/DL (ref 6.4–8.2)
RBC # BLD: 5.81 MILL/MM3 (ref 4.5–6.1)
SEG NEUTROPHILS: 72.4 % (ref 43–75)
SODIUM SERPL-SCNC: 140 MEQ/L (ref 136–145)
TROPONIN, HIGH SENSITIVITY: 22 PG/ML (ref 0–76.1)
WBC # BLD: 9.4 THOU/MM3 (ref 4.8–10.8)

## 2024-05-02 PROCEDURE — 71045 X-RAY EXAM CHEST 1 VIEW: CPT

## 2024-05-02 PROCEDURE — 2140000000 HC CCU INTERMEDIATE R&B

## 2024-05-02 PROCEDURE — 93005 ELECTROCARDIOGRAM TRACING: CPT | Performed by: EMERGENCY MEDICINE

## 2024-05-02 PROCEDURE — 84484 ASSAY OF TROPONIN QUANT: CPT

## 2024-05-02 PROCEDURE — 96375 TX/PRO/DX INJ NEW DRUG ADDON: CPT

## 2024-05-02 PROCEDURE — 96374 THER/PROPH/DIAG INJ IV PUSH: CPT

## 2024-05-02 PROCEDURE — 2580000003 HC RX 258: Performed by: EMERGENCY MEDICINE

## 2024-05-02 PROCEDURE — 99285 EMERGENCY DEPT VISIT HI MDM: CPT

## 2024-05-02 PROCEDURE — 80053 COMPREHEN METABOLIC PANEL: CPT

## 2024-05-02 PROCEDURE — 83880 ASSAY OF NATRIURETIC PEPTIDE: CPT

## 2024-05-02 PROCEDURE — 85730 THROMBOPLASTIN TIME PARTIAL: CPT

## 2024-05-02 PROCEDURE — 85025 COMPLETE CBC W/AUTO DIFF WBC: CPT

## 2024-05-02 PROCEDURE — 85610 PROTHROMBIN TIME: CPT

## 2024-05-02 PROCEDURE — 2500000003 HC RX 250 WO HCPCS: Performed by: EMERGENCY MEDICINE

## 2024-05-02 RX ORDER — DILTIAZEM HYDROCHLORIDE 5 MG/ML
10 INJECTION INTRAVENOUS ONCE
Status: COMPLETED | OUTPATIENT
Start: 2024-05-02 | End: 2024-05-02

## 2024-05-02 RX ORDER — METOPROLOL TARTRATE 1 MG/ML
5 INJECTION, SOLUTION INTRAVENOUS EVERY 5 MIN PRN
Status: DISCONTINUED | OUTPATIENT
Start: 2024-05-02 | End: 2024-05-03

## 2024-05-02 RX ADMIN — METOPROLOL TARTRATE 5 MG: 5 INJECTION INTRAVENOUS at 21:04

## 2024-05-02 RX ADMIN — METOPROLOL TARTRATE 5 MG: 5 INJECTION INTRAVENOUS at 20:53

## 2024-05-02 RX ADMIN — DILTIAZEM HYDROCHLORIDE 5 MG/HR: 5 INJECTION INTRAVENOUS at 21:19

## 2024-05-02 RX ADMIN — DILTIAZEM HYDROCHLORIDE 10 MG: 5 INJECTION, SOLUTION INTRAVENOUS at 21:19

## 2024-05-02 ASSESSMENT — PAIN - FUNCTIONAL ASSESSMENT: PAIN_FUNCTIONAL_ASSESSMENT: NONE - DENIES PAIN

## 2024-05-03 ENCOUNTER — APPOINTMENT (OUTPATIENT)
Age: 54
DRG: 309 | End: 2024-05-03
Payer: COMMERCIAL

## 2024-05-03 LAB
AMPHETAMINES UR QL SCN: NEGATIVE
ANION GAP SERPL CALC-SCNC: 11 MEQ/L (ref 8–16)
BARBITURATES UR QL SCN: NEGATIVE
BENZODIAZ UR QL SCN: NEGATIVE
BUN SERPL-MCNC: 23 MG/DL (ref 7–22)
BZE UR QL SCN: NEGATIVE
CALCIUM SERPL-MCNC: 9.7 MG/DL (ref 8.5–10.5)
CANNABINOIDS UR QL SCN: NEGATIVE
CHLORIDE SERPL-SCNC: 102 MEQ/L (ref 98–111)
CHOLEST SERPL-MCNC: 113 MG/DL (ref 100–199)
CO2 SERPL-SCNC: 26 MEQ/L (ref 23–33)
CREAT SERPL-MCNC: 0.8 MG/DL (ref 0.4–1.2)
DEPRECATED MEAN GLUCOSE BLD GHB EST-ACNC: 99 MG/DL (ref 70–126)
DEPRECATED RDW RBC AUTO: 53.2 FL (ref 35–45)
ECHO AO ASC DIAM: 3.1 CM
ECHO AO ASCENDING AORTA INDEX: 1.13 CM/M2
ECHO AV CUSP MM: 2.3 CM
ECHO AV PEAK GRADIENT: 14 MMHG
ECHO AV PEAK VELOCITY: 1.9 M/S
ECHO AV VELOCITY RATIO: 0.53
ECHO BSA: 2.88 M2
ECHO IVC PROX: 2.4 CM
ECHO LA AREA 2C: 21.8 CM2
ECHO LA AREA 4C: 23.4 CM2
ECHO LA MAJOR AXIS: 6 CM
ECHO LA MINOR AXIS: 5.5 CM
ECHO LA VOL BP: 76 ML (ref 18–58)
ECHO LA VOL MOD A2C: 73 ML (ref 18–58)
ECHO LA VOL MOD A4C: 73 ML (ref 18–58)
ECHO LA VOL/BSA BIPLANE: 28 ML/M2 (ref 16–34)
ECHO LA VOLUME INDEX MOD A4C: 27 ML/M2 (ref 16–34)
ECHO LV FRACTIONAL SHORTENING: 32 % (ref 28–44)
ECHO LV INTERNAL DIMENSION DIASTOLE INDEX: 1.93 CM/M2
ECHO LV INTERNAL DIMENSION DIASTOLIC: 5.3 CM (ref 4.2–5.9)
ECHO LV INTERNAL DIMENSION SYSTOLIC INDEX: 1.31 CM/M2
ECHO LV INTERNAL DIMENSION SYSTOLIC: 3.6 CM
ECHO LV ISOVOLUMETRIC RELAXATION TIME (IVRT): 49 MS
ECHO LV IVSD: 1.2 CM (ref 0.6–1)
ECHO LV MASS 2D: 256.6 G (ref 88–224)
ECHO LV MASS INDEX 2D: 93.3 G/M2 (ref 49–115)
ECHO LV POSTERIOR WALL DIASTOLIC: 1.2 CM (ref 0.6–1)
ECHO LV RELATIVE WALL THICKNESS RATIO: 0.45
ECHO LVOT PEAK GRADIENT: 4 MMHG
ECHO LVOT PEAK VELOCITY: 1 M/S
ECHO MV E DECELERATION TIME (DT): 271 MS
ECHO MV E VELOCITY: 1.11 M/S
ECHO PV MAX VELOCITY: 0.7 M/S
ECHO PV PEAK GRADIENT: 2 MMHG
ECHO RV INTERNAL DIMENSION: 3.8 CM
ECHO TV E WAVE: 0.8 M/S
EKG Q-T INTERVAL: 276 MS
EKG Q-T INTERVAL: 310 MS
EKG Q-T INTERVAL: 324 MS
EKG QRS DURATION: 86 MS
EKG QRS DURATION: 88 MS
EKG QRS DURATION: 92 MS
EKG QTC CALCULATION (BAZETT): 434 MS
EKG QTC CALCULATION (BAZETT): 454 MS
EKG QTC CALCULATION (BAZETT): 459 MS
EKG R AXIS: -4 DEGREES
EKG R AXIS: -7 DEGREES
EKG R AXIS: 11 DEGREES
EKG T AXIS: 39 DEGREES
EKG T AXIS: 63 DEGREES
EKG T AXIS: 63 DEGREES
EKG VENTRICULAR RATE: 108 BPM
EKG VENTRICULAR RATE: 132 BPM
EKG VENTRICULAR RATE: 163 BPM
ERYTHROCYTE [DISTWIDTH] IN BLOOD BY AUTOMATED COUNT: 17.2 % (ref 11.5–14.5)
FENTANYL: NEGATIVE
GFR SERPL CREATININE-BSD FRML MDRD: > 90 ML/MIN/1.73M2
GLUCOSE SERPL-MCNC: 127 MG/DL (ref 70–108)
HBA1C MFR BLD HPLC: 5.3 % (ref 4.4–6.4)
HCT VFR BLD AUTO: 53.3 % (ref 42–52)
HDLC SERPL-MCNC: 23 MG/DL
HEPARIN UNFRACTIONATED: < 0.04 U/ML (ref 0.3–0.7)
HGB BLD-MCNC: 16.9 GM/DL (ref 14–18)
INR PPP: 1.66 (ref 0.85–1.13)
LDLC SERPL CALC-MCNC: 59 MG/DL
MAGNESIUM SERPL-MCNC: 2 MG/DL (ref 1.6–2.4)
MCH RBC QN AUTO: 28.8 PG (ref 26–33)
MCHC RBC AUTO-ENTMCNC: 31.7 GM/DL (ref 32.2–35.5)
MCV RBC AUTO: 91 FL (ref 80–94)
OPIATES UR QL SCN: NEGATIVE
OXYCODONE: NEGATIVE
PCP UR QL SCN: NEGATIVE
PLATELET # BLD AUTO: 202 THOU/MM3 (ref 130–400)
PMV BLD AUTO: 9.8 FL (ref 9.4–12.4)
POTASSIUM SERPL-SCNC: 4.1 MEQ/L (ref 3.5–5.2)
RBC # BLD AUTO: 5.86 MILL/MM3 (ref 4.7–6.1)
SODIUM SERPL-SCNC: 139 MEQ/L (ref 135–145)
T4 FREE SERPL-MCNC: 1.16 NG/DL (ref 0.93–1.68)
TRIGL SERPL-MCNC: 156 MG/DL (ref 0–199)
TSH SERPL DL<=0.005 MIU/L-ACNC: 1.82 UIU/ML (ref 0.4–4.2)
WBC # BLD AUTO: 8.7 THOU/MM3 (ref 4.8–10.8)

## 2024-05-03 PROCEDURE — 6360000002 HC RX W HCPCS

## 2024-05-03 PROCEDURE — 93010 ELECTROCARDIOGRAM REPORT: CPT | Performed by: INTERNAL MEDICINE

## 2024-05-03 PROCEDURE — 93306 TTE W/DOPPLER COMPLETE: CPT | Performed by: NUCLEAR MEDICINE

## 2024-05-03 PROCEDURE — 6370000000 HC RX 637 (ALT 250 FOR IP): Performed by: HOSPITALIST

## 2024-05-03 PROCEDURE — 94660 CPAP INITIATION&MGMT: CPT

## 2024-05-03 PROCEDURE — 85520 HEPARIN ASSAY: CPT

## 2024-05-03 PROCEDURE — 2140000000 HC CCU INTERMEDIATE R&B

## 2024-05-03 PROCEDURE — 2500000003 HC RX 250 WO HCPCS: Performed by: EMERGENCY MEDICINE

## 2024-05-03 PROCEDURE — 6370000000 HC RX 637 (ALT 250 FOR IP)

## 2024-05-03 PROCEDURE — 84439 ASSAY OF FREE THYROXINE: CPT

## 2024-05-03 PROCEDURE — 83735 ASSAY OF MAGNESIUM: CPT

## 2024-05-03 PROCEDURE — 84443 ASSAY THYROID STIM HORMONE: CPT

## 2024-05-03 PROCEDURE — 2580000003 HC RX 258: Performed by: EMERGENCY MEDICINE

## 2024-05-03 PROCEDURE — 94640 AIRWAY INHALATION TREATMENT: CPT

## 2024-05-03 PROCEDURE — 85027 COMPLETE CBC AUTOMATED: CPT

## 2024-05-03 PROCEDURE — 5A09357 ASSISTANCE WITH RESPIRATORY VENTILATION, LESS THAN 24 CONSECUTIVE HOURS, CONTINUOUS POSITIVE AIRWAY PRESSURE: ICD-10-PCS

## 2024-05-03 PROCEDURE — 99223 1ST HOSP IP/OBS HIGH 75: CPT | Performed by: HOSPITALIST

## 2024-05-03 PROCEDURE — 2580000003 HC RX 258

## 2024-05-03 PROCEDURE — 2500000003 HC RX 250 WO HCPCS

## 2024-05-03 PROCEDURE — 93306 TTE W/DOPPLER COMPLETE: CPT

## 2024-05-03 PROCEDURE — 80061 LIPID PANEL: CPT

## 2024-05-03 PROCEDURE — 85610 PROTHROMBIN TIME: CPT

## 2024-05-03 PROCEDURE — 80307 DRUG TEST PRSMV CHEM ANLYZR: CPT

## 2024-05-03 PROCEDURE — 93005 ELECTROCARDIOGRAM TRACING: CPT

## 2024-05-03 PROCEDURE — 83036 HEMOGLOBIN GLYCOSYLATED A1C: CPT

## 2024-05-03 PROCEDURE — 36415 COLL VENOUS BLD VENIPUNCTURE: CPT

## 2024-05-03 PROCEDURE — 80048 BASIC METABOLIC PNL TOTAL CA: CPT

## 2024-05-03 RX ORDER — ACETAMINOPHEN 325 MG/1
650 TABLET ORAL EVERY 6 HOURS PRN
Status: DISCONTINUED | OUTPATIENT
Start: 2024-05-03 | End: 2024-05-07 | Stop reason: HOSPADM

## 2024-05-03 RX ORDER — ONDANSETRON 2 MG/ML
4 INJECTION INTRAMUSCULAR; INTRAVENOUS EVERY 6 HOURS PRN
Status: DISCONTINUED | OUTPATIENT
Start: 2024-05-03 | End: 2024-05-07 | Stop reason: HOSPADM

## 2024-05-03 RX ORDER — WARFARIN SODIUM 10 MG/1
10 TABLET ORAL
Status: COMPLETED | OUTPATIENT
Start: 2024-05-03 | End: 2024-05-03

## 2024-05-03 RX ORDER — DILTIAZEM HYDROCHLORIDE 5 MG/ML
10 INJECTION INTRAVENOUS ONCE
Status: COMPLETED | OUTPATIENT
Start: 2024-05-03 | End: 2024-05-03

## 2024-05-03 RX ORDER — ATORVASTATIN CALCIUM 10 MG/1
10 TABLET, FILM COATED ORAL DAILY
Status: DISCONTINUED | OUTPATIENT
Start: 2024-05-03 | End: 2024-05-07 | Stop reason: HOSPADM

## 2024-05-03 RX ORDER — ESCITALOPRAM OXALATE 20 MG/1
20 TABLET ORAL DAILY
Status: DISCONTINUED | OUTPATIENT
Start: 2024-05-03 | End: 2024-05-07 | Stop reason: HOSPADM

## 2024-05-03 RX ORDER — SENNA AND DOCUSATE SODIUM 50; 8.6 MG/1; MG/1
2 TABLET, FILM COATED ORAL 2 TIMES DAILY
Status: DISCONTINUED | OUTPATIENT
Start: 2024-05-03 | End: 2024-05-07 | Stop reason: HOSPADM

## 2024-05-03 RX ORDER — ACETAMINOPHEN 650 MG/1
650 SUPPOSITORY RECTAL EVERY 6 HOURS PRN
Status: DISCONTINUED | OUTPATIENT
Start: 2024-05-03 | End: 2024-05-07 | Stop reason: HOSPADM

## 2024-05-03 RX ORDER — ONDANSETRON 4 MG/1
4 TABLET, ORALLY DISINTEGRATING ORAL EVERY 8 HOURS PRN
Status: DISCONTINUED | OUTPATIENT
Start: 2024-05-03 | End: 2024-05-07 | Stop reason: HOSPADM

## 2024-05-03 RX ORDER — HEPARIN SODIUM 1000 [USP'U]/ML
4000 INJECTION, SOLUTION INTRAVENOUS; SUBCUTANEOUS PRN
Status: DISCONTINUED | OUTPATIENT
Start: 2024-05-03 | End: 2024-05-05 | Stop reason: ALTCHOICE

## 2024-05-03 RX ORDER — SODIUM CHLORIDE 0.9 % (FLUSH) 0.9 %
5-40 SYRINGE (ML) INJECTION EVERY 12 HOURS SCHEDULED
Status: DISCONTINUED | OUTPATIENT
Start: 2024-05-03 | End: 2024-05-07 | Stop reason: HOSPADM

## 2024-05-03 RX ORDER — METOPROLOL TARTRATE 50 MG/1
50 TABLET, FILM COATED ORAL 2 TIMES DAILY
Status: DISCONTINUED | OUTPATIENT
Start: 2024-05-03 | End: 2024-05-04

## 2024-05-03 RX ORDER — ALBUTEROL SULFATE 90 UG/1
2 AEROSOL, METERED RESPIRATORY (INHALATION) EVERY 6 HOURS PRN
Status: DISCONTINUED | OUTPATIENT
Start: 2024-05-03 | End: 2024-05-07 | Stop reason: HOSPADM

## 2024-05-03 RX ORDER — HEPARIN SODIUM 1000 [USP'U]/ML
2000 INJECTION, SOLUTION INTRAVENOUS; SUBCUTANEOUS PRN
Status: DISCONTINUED | OUTPATIENT
Start: 2024-05-03 | End: 2024-05-05 | Stop reason: ALTCHOICE

## 2024-05-03 RX ORDER — HEPARIN SODIUM 10000 [USP'U]/100ML
5-30 INJECTION, SOLUTION INTRAVENOUS CONTINUOUS
Status: DISCONTINUED | OUTPATIENT
Start: 2024-05-03 | End: 2024-05-03 | Stop reason: SDUPTHER

## 2024-05-03 RX ORDER — HEPARIN SODIUM 1000 [USP'U]/ML
4000 INJECTION, SOLUTION INTRAVENOUS; SUBCUTANEOUS ONCE
Status: COMPLETED | OUTPATIENT
Start: 2024-05-03 | End: 2024-05-03

## 2024-05-03 RX ORDER — HEPARIN SODIUM 10000 [USP'U]/100ML
5-30 INJECTION, SOLUTION INTRAVENOUS CONTINUOUS
Status: DISCONTINUED | OUTPATIENT
Start: 2024-05-03 | End: 2024-05-05 | Stop reason: ALTCHOICE

## 2024-05-03 RX ORDER — POLYETHYLENE GLYCOL 3350 17 G/17G
17 POWDER, FOR SOLUTION ORAL DAILY PRN
Status: DISCONTINUED | OUTPATIENT
Start: 2024-05-03 | End: 2024-05-07 | Stop reason: HOSPADM

## 2024-05-03 RX ORDER — SODIUM CHLORIDE 0.9 % (FLUSH) 0.9 %
5-40 SYRINGE (ML) INJECTION PRN
Status: DISCONTINUED | OUTPATIENT
Start: 2024-05-03 | End: 2024-05-07 | Stop reason: HOSPADM

## 2024-05-03 RX ORDER — SODIUM CHLORIDE 9 MG/ML
INJECTION, SOLUTION INTRAVENOUS PRN
Status: DISCONTINUED | OUTPATIENT
Start: 2024-05-03 | End: 2024-05-07 | Stop reason: HOSPADM

## 2024-05-03 RX ORDER — BUSPIRONE HYDROCHLORIDE 10 MG/1
10 TABLET ORAL 2 TIMES DAILY
Status: DISCONTINUED | OUTPATIENT
Start: 2024-05-03 | End: 2024-05-07 | Stop reason: HOSPADM

## 2024-05-03 RX ORDER — VALSARTAN 80 MG/1
80 TABLET ORAL DAILY
Status: DISCONTINUED | OUTPATIENT
Start: 2024-05-03 | End: 2024-05-06

## 2024-05-03 RX ADMIN — DILTIAZEM HYDROCHLORIDE 5 MG/HR: 5 INJECTION INTRAVENOUS at 19:53

## 2024-05-03 RX ADMIN — METOPROLOL TARTRATE 50 MG: 50 TABLET, FILM COATED ORAL at 19:47

## 2024-05-03 RX ADMIN — HEPARIN SODIUM 4000 UNITS: 1000 INJECTION INTRAVENOUS; SUBCUTANEOUS at 15:07

## 2024-05-03 RX ADMIN — ESCITALOPRAM OXALATE 20 MG: 20 TABLET ORAL at 09:40

## 2024-05-03 RX ADMIN — ATORVASTATIN CALCIUM 10 MG: 10 TABLET, FILM COATED ORAL at 09:40

## 2024-05-03 RX ADMIN — MOMETASONE FUROATE AND FORMOTEROL FUMARATE DIHYDRATE 2 PUFF: 200; 5 AEROSOL RESPIRATORY (INHALATION) at 16:28

## 2024-05-03 RX ADMIN — MOMETASONE FUROATE AND FORMOTEROL FUMARATE DIHYDRATE 2 PUFF: 200; 5 AEROSOL RESPIRATORY (INHALATION) at 07:58

## 2024-05-03 RX ADMIN — VALSARTAN 80 MG: 80 TABLET ORAL at 09:40

## 2024-05-03 RX ADMIN — DILTIAZEM HYDROCHLORIDE 15 MG/HR: 5 INJECTION INTRAVENOUS at 06:33

## 2024-05-03 RX ADMIN — BUSPIRONE HYDROCHLORIDE 10 MG: 10 TABLET ORAL at 09:40

## 2024-05-03 RX ADMIN — WARFARIN SODIUM 10 MG: 10 TABLET ORAL at 18:19

## 2024-05-03 RX ADMIN — METOPROLOL TARTRATE 50 MG: 50 TABLET, FILM COATED ORAL at 09:40

## 2024-05-03 RX ADMIN — HEPARIN SODIUM 6 UNITS/KG/HR: 10000 INJECTION, SOLUTION INTRAVENOUS at 15:09

## 2024-05-03 RX ADMIN — DILTIAZEM HYDROCHLORIDE 10 MG: 5 INJECTION, SOLUTION INTRAVENOUS at 18:34

## 2024-05-03 RX ADMIN — BUSPIRONE HYDROCHLORIDE 10 MG: 10 TABLET ORAL at 19:47

## 2024-05-03 RX ADMIN — HEPARIN SODIUM 4000 UNITS: 1000 INJECTION INTRAVENOUS; SUBCUTANEOUS at 21:30

## 2024-05-03 NOTE — CARE COORDINATION
Case Management Assessment Initial Evaluation    Date/Time of Evaluation: 5/3/2024 7:10 AM  Assessment Completed by: Nayeli Vazquez RN    If patient is discharged prior to next notation, then this note serves as note for discharge by case management.    Patient Name: Luis Palmer                   YOB: 1970  Diagnosis: Atrial fibrillation with RVR (HCC) [I48.91]                   Date / Time: 2024  8:41 PM  Location: [address] [place] [address]     Patient Admission Status: Inpatient   Readmission Risk Low 0-14, Mod 15-19), High > 20: Readmission Risk Score: 8.1    Current PCP: Leopold, Katelyn Ann, MD    Additional Case Management Notes: Admitted through ED with palpitations, found to be in Afib with RVR, rate in the 160's. Started on Cardizem gtt in ED. Ventricular rate no 95. Troponin 22.     Procedures:   5/3 Echo: ordered.     Imagin/2 PCXR: Nothing acute.     Patient Goals/Plan/Treatment Preferences: Spoke with pt. He lives at home alone. He is independent and works. Denies any discharge needs. Good family support.        24 1038   Service Assessment   Patient Orientation Alert and Oriented   Cognition Alert   History Provided By Patient   Primary Caregiver Self   Accompanied By/Relationship n/a   Support Systems Parent;Family Members   Patient's Healthcare Decision Maker is: Patient Declined (Legal Next of Kin Remains as Decision Maker)   PCP Verified by CM Yes   Last Visit to PCP Within last 6 months   Prior Functional Level Independent in ADLs/IADLs   Current Functional Level Independent in ADLs/IADLs   Can patient return to prior living arrangement Yes   Ability to make needs known: Good   Family able to assist with home care needs: Yes   Would you like for me to discuss the discharge plan with any other family members/significant others, and if so, who? No   Financial Resources Other (Comment)  (MMO commercial)   Community Resources None   Social/Functional History   Active  Yes

## 2024-05-03 NOTE — ED NOTES
Room marked clean and ready in epic. Pt loaded up onto EMS cot and into EMS squad and transported to [facility] 3B-25.

## 2024-05-03 NOTE — ED PROVIDER NOTES
Affect normal, Judgment normal, Mood normal.     DIAGNOSTIC RESULTS     EKG: Atrial fibs with rapid ventricular response of 163 without ischemia or infarction    RADIOLOGY:         Interpretation per the Radiologist below, if available at the time of this note:    XR CHEST PORTABLE   Final Result   1. No acute cardiopulmonary process demonstrated.      This document has been electronically signed by: Toro Villeda MD on    05/02/2024 09:31 PM            LABS:  Labs Reviewed   CBC WITH AUTO DIFFERENTIAL - Abnormal; Notable for the following components:       Result Value    Hematocrit 52.1 (*)     RDW 16.2 (*)     MPV 9.1 (*)     All other components within normal limits   COMPREHENSIVE METABOLIC PANEL - Abnormal; Notable for the following components:    Glucose 126 (*)     BUN 25 (*)     All other components within normal limits   PROTIME-INR - Abnormal; Notable for the following components:    INR 1.86 (*)     All other components within normal limits   ANION GAP - Abnormal; Notable for the following components:    Anion Gap 7.0 (*)     All other components within normal limits   TROPONIN   BRAIN NATRIURETIC PEPTIDE   APTT   GLOMERULAR FILTRATION RATE, ESTIMATED       All other labs were within normal range or not returned as of this dictation.    MIPS:  Not applicable      EMERGENCY DEPARTMENT COURSE and DIFFERENTIAL DIAGNOSIS/MDM:       Patient presents with rapid heartbeat history of A-fib.  EKG and clinical exam suggest rapid A-fib.  Checking some routine cardiac markers labs chest x-ray.  Lopressor to decrease heart rate.    1)  Number and Complexity of Problems  Problem List This Visit: Irregular heartbeat  Problem List Items Addressed This Visit    None  Visit Diagnoses       Atrial fibrillation with RVR (HCC)    -  Primary    Relevant Medications    metoprolol (LOPRESSOR) injection 5 mg    dilTIAZem injection 10 mg (Completed)    dilTIAZem 125 mg in sodium chloride 0.9 % 125 mL infusion

## 2024-05-03 NOTE — ED TRIAGE NOTES
Pt comes walking into ER room 1 from triage with heart palpitations that started around 1700 while he was driving and drinking and ICE COLD SUSHI. Pt states that about 3 years ago he had A-Fib after drinking ICE COLD drinks then as well. He denies chest pain or sob. 2 + edema in lower legs. He has COPD and says that he is always a little SOB. Lungs clear.

## 2024-05-03 NOTE — ED NOTES
ED to inpatient nurses report      Chief Complaint:  Chief Complaint   Patient presents with    Palpitations    Irregular Heart Beat     Present to ED from: HOME     MOA:     LOC: alert and orientated to name, place, date  Mobility: Independent  Oxygen Baseline: 95% ROOM AIR     Current needs required: NONE     Code Status:   Prior    What abnormal results were found and what did you give/do to treat them? A-FIB RVR = DILTAZEM DRIP / LOPRESSOR   Any procedures or intervention occur? NONE     Mental Status:  Level of Consciousness: Alert (0)    Psych Assessment:   Psychosocial  Psychosocial (WDL): Within Defined Limits    Vitals:  Patient Vitals for the past 24 hrs:   BP Temp Temp src Pulse Resp SpO2 Height Weight   05/02/24 2326 116/72 -- -- 83 22 94 % -- --   05/02/24 2316 137/69 -- -- (!) 118 22 95 % -- --   05/02/24 2246 123/73 -- -- (!) 112 15 94 % -- --   05/02/24 2226 (!) 134/106 -- -- (!) 133 21 94 % -- --   05/02/24 2220 -- -- -- (!) 111 18 93 % -- --   05/02/24 2216 (!) 139/114 -- -- (!) 108 19 94 % -- --   05/02/24 2206 123/86 -- -- -- 22 92 % -- --   05/02/24 2155 102/71 -- -- (!) 123 19 94 % -- --   05/02/24 2154 -- -- -- (!) 108 28 94 % -- --   05/02/24 2153 -- -- -- 96 13 95 % -- --   05/02/24 2138 (!) 115/58 -- -- (!) 128 19 95 % -- --   05/02/24 2128 106/67 -- -- -- -- -- -- --   05/02/24 2127 -- -- -- (!) 135 21 94 % -- --   05/02/24 2125 -- -- -- (!) 131 25 95 % -- --   05/02/24 2123 -- -- -- (!) 119 20 94 % -- --   05/02/24 2118 102/75 -- -- -- -- -- -- --   05/02/24 2054 (!) 164/87 97.9 °F (36.6 °C) Temporal (!) 155 22 97 % 1.854 m (6' 1\") (!) 154.2 kg (340 lb)        LDAs:   Peripheral IV 05/02/24 Right Antecubital (Active)   Site Assessment Clean, dry & intact 05/02/24 2331   Line Status Infusing 05/02/24 2247   Line Care Cap changed 05/02/24 2331   Phlebitis Assessment No symptoms 05/02/24 2331   Infiltration Assessment 0 05/02/24 2331       Ambulatory Status:  Presents to emergency department

## 2024-05-03 NOTE — H&P
Internal Medicine Resident History and Physical          Name: Luis Palmer, male, : 1970, MRN: [id number]    PCP: Leopold, Katelyn Ann, MD    Date of Admission: 2024  Date of Service: Pt seen/examined on 24  and Admitted to Inpatient with expected LOS greater than two midnights due to medical therapy.         Assessment and Plan:  A-fib RVR: [id number] score of 2.  EKG on presentation showed A-fib RVR ventricular rate 163.  Currently on anticoagulation with Coumadin.  Telemetry showing A-fib ventricular rate 110 at time of evaluation.  Patient reports good compliance with home Lopressor 25 bid, likely having uncontrolled paroxysmal A-fib with rapid ventricular response on current home medications.  Pharmacy to dose Coumadin INR goal 2-3  TSH with reflex to T4  Limited cardiac echo  Continue diltiazem drip, wean as tolerated  Lopressor 25 twice daily increased to 50 twice daily  Recommend follow-up with cardiology clinic as outpatient  Keep Mag >2  Keep Potassium >4  Chronic Conditions (reviewed and stable unless otherwise stated)   Anxiety/depression  Hypertension  Hyperlipidemia  Lipid panel  GAETANO: CPAP settings 6 cm H2O  Unverified COPD: No formal PFTs on file will continue home inhalers  History of pulmonary embolism   Prediabetes last A1c 2024 was 6.4  Repeat A1c  Morbid obesity BMI 46.98        =======================================================================      Chief Complaint: A-fib RVR    History Of Present Illness:  Luis Palmer is a 53 y.o. male with PMHx of A-fib RVR status post DCCV in , hypertension, hyperlipidemia, anxiety/depression, bilateral pulmonary embolism in  on Coumadin chronically, COPD, GAETANO with CPAP use who presents to [facility] as a direct transfer from [facility] 2024 with chief complaint of A-fib RVR.  Home medications include Lopressor 25 twice daily.  Patient reports that he was driving home from work

## 2024-05-03 NOTE — PLAN OF CARE
Problem: Respiratory - Adult  Goal: Clear lung sounds  5/3/2024 1632 by Jena Riley, RCP  Outcome: Progressing

## 2024-05-03 NOTE — SIGNIFICANT EVENT
Patient weaned off diltiazem drip earlier today.  Patient back in A-fib RVR with a heart rate in the 140s.  Repeat EKG ordered.  Patient being restarted on diltiazem drip.

## 2024-05-03 NOTE — PLAN OF CARE
Luis Palmre is a 53 y.o. male with PMHx of A-fib RVR status post DCCV in 2021, hypertension, hyperlipidemia, anxiety/depression, bilateral pulmonary embolism in 2021 on Coumadin chronically, COPD, GAETANO with CPAP use who presents to [facility] with chief complaint of A-fib RVR.     Assessment:  A-fib with RVR  Subtherapeutic INR    Plan:   Wean off dilt drip  Home Lopressor dose 25 mg twice daily, increased to 50 mg twice daily  Echo pending  Continue Coumadin, INR subtherapeutic at 1.66  Bridge with heparin drip  Maintain mag over 2 and potassium over 4

## 2024-05-04 LAB
ANION GAP SERPL CALC-SCNC: 11 MEQ/L (ref 8–16)
BUN SERPL-MCNC: 18 MG/DL (ref 7–22)
CALCIUM SERPL-MCNC: 8.7 MG/DL (ref 8.5–10.5)
CHLORIDE SERPL-SCNC: 103 MEQ/L (ref 98–111)
CO2 SERPL-SCNC: 23 MEQ/L (ref 23–33)
CREAT SERPL-MCNC: 0.8 MG/DL (ref 0.4–1.2)
DEPRECATED RDW RBC AUTO: 54.4 FL (ref 35–45)
ERYTHROCYTE [DISTWIDTH] IN BLOOD BY AUTOMATED COUNT: 16.7 % (ref 11.5–14.5)
FLUAV RNA RESP QL NAA+PROBE: NOT DETECTED
FLUBV RNA RESP QL NAA+PROBE: NOT DETECTED
GFR SERPL CREATININE-BSD FRML MDRD: > 90 ML/MIN/1.73M2
GLUCOSE SERPL-MCNC: 119 MG/DL (ref 70–108)
HCT VFR BLD AUTO: 52.2 % (ref 42–52)
HEPARIN UNFRACTIONATED: 0.12 U/ML (ref 0.3–0.7)
HEPARIN UNFRACTIONATED: 0.27 U/ML (ref 0.3–0.7)
HEPARIN UNFRACTIONATED: 0.3 U/ML (ref 0.3–0.7)
HEPARIN UNFRACTIONATED: 0.35 U/ML (ref 0.3–0.7)
HGB BLD-MCNC: 16.2 GM/DL (ref 14–18)
INR PPP: 1.68 (ref 0.85–1.13)
MCH RBC QN AUTO: 28.5 PG (ref 26–33)
MCHC RBC AUTO-ENTMCNC: 31 GM/DL (ref 32.2–35.5)
MCV RBC AUTO: 91.9 FL (ref 80–94)
PLATELET # BLD AUTO: 211 THOU/MM3 (ref 130–400)
PMV BLD AUTO: 9.7 FL (ref 9.4–12.4)
POTASSIUM SERPL-SCNC: 3.9 MEQ/L (ref 3.5–5.2)
RBC # BLD AUTO: 5.68 MILL/MM3 (ref 4.7–6.1)
SARS-COV-2 RNA RESP QL NAA+PROBE: NOT DETECTED
SODIUM SERPL-SCNC: 137 MEQ/L (ref 135–145)
WBC # BLD AUTO: 11.2 THOU/MM3 (ref 4.8–10.8)

## 2024-05-04 PROCEDURE — 2500000003 HC RX 250 WO HCPCS

## 2024-05-04 PROCEDURE — 6370000000 HC RX 637 (ALT 250 FOR IP)

## 2024-05-04 PROCEDURE — 85520 HEPARIN ASSAY: CPT

## 2024-05-04 PROCEDURE — 6360000002 HC RX W HCPCS

## 2024-05-04 PROCEDURE — 2140000000 HC CCU INTERMEDIATE R&B

## 2024-05-04 PROCEDURE — 85610 PROTHROMBIN TIME: CPT

## 2024-05-04 PROCEDURE — 6370000000 HC RX 637 (ALT 250 FOR IP): Performed by: PHARMACIST

## 2024-05-04 PROCEDURE — 94640 AIRWAY INHALATION TREATMENT: CPT

## 2024-05-04 PROCEDURE — 36415 COLL VENOUS BLD VENIPUNCTURE: CPT

## 2024-05-04 PROCEDURE — 85027 COMPLETE CBC AUTOMATED: CPT

## 2024-05-04 PROCEDURE — 87636 SARSCOV2 & INF A&B AMP PRB: CPT

## 2024-05-04 PROCEDURE — 99233 SBSQ HOSP IP/OBS HIGH 50: CPT | Performed by: HOSPITALIST

## 2024-05-04 PROCEDURE — 80048 BASIC METABOLIC PNL TOTAL CA: CPT

## 2024-05-04 PROCEDURE — 2580000003 HC RX 258

## 2024-05-04 RX ORDER — METOPROLOL TARTRATE 100 MG/1
100 TABLET ORAL 2 TIMES DAILY
Status: DISCONTINUED | OUTPATIENT
Start: 2024-05-04 | End: 2024-05-06

## 2024-05-04 RX ADMIN — METOPROLOL TARTRATE 100 MG: 50 TABLET, FILM COATED ORAL at 08:40

## 2024-05-04 RX ADMIN — HEPARIN SODIUM 12 UNITS/KG/HR: 10000 INJECTION, SOLUTION INTRAVENOUS at 08:30

## 2024-05-04 RX ADMIN — METOPROLOL TARTRATE 100 MG: 50 TABLET, FILM COATED ORAL at 20:11

## 2024-05-04 RX ADMIN — HEPARIN SODIUM 2000 UNITS: 1000 INJECTION INTRAVENOUS; SUBCUTANEOUS at 04:16

## 2024-05-04 RX ADMIN — HEPARIN SODIUM 14 UNITS/KG/HR: 10000 INJECTION, SOLUTION INTRAVENOUS at 20:20

## 2024-05-04 RX ADMIN — BUSPIRONE HYDROCHLORIDE 10 MG: 10 TABLET ORAL at 08:40

## 2024-05-04 RX ADMIN — HEPARIN SODIUM 2000 UNITS: 1000 INJECTION INTRAVENOUS; SUBCUTANEOUS at 11:19

## 2024-05-04 RX ADMIN — MOMETASONE FUROATE AND FORMOTEROL FUMARATE DIHYDRATE 2 PUFF: 200; 5 AEROSOL RESPIRATORY (INHALATION) at 08:46

## 2024-05-04 RX ADMIN — ATORVASTATIN CALCIUM 10 MG: 10 TABLET, FILM COATED ORAL at 08:41

## 2024-05-04 RX ADMIN — DILTIAZEM HYDROCHLORIDE 5 MG/HR: 5 INJECTION INTRAVENOUS at 20:10

## 2024-05-04 RX ADMIN — BUSPIRONE HYDROCHLORIDE 10 MG: 10 TABLET ORAL at 20:08

## 2024-05-04 RX ADMIN — VALSARTAN 80 MG: 80 TABLET ORAL at 08:40

## 2024-05-04 RX ADMIN — MOMETASONE FUROATE AND FORMOTEROL FUMARATE DIHYDRATE 2 PUFF: 200; 5 AEROSOL RESPIRATORY (INHALATION) at 16:46

## 2024-05-04 RX ADMIN — ESCITALOPRAM OXALATE 20 MG: 20 TABLET ORAL at 08:40

## 2024-05-04 RX ADMIN — WARFARIN SODIUM 12.5 MG: 10 TABLET ORAL at 17:10

## 2024-05-04 RX ADMIN — DILTIAZEM HYDROCHLORIDE 5 MG/HR: 5 INJECTION INTRAVENOUS at 00:46

## 2024-05-04 NOTE — RT PROTOCOL NOTE
RT Inhaler-Nebulizer Bronchodilator Protocol Note    There is a bronchodilator order in the chart from a provider indicating to follow the RT Bronchodilator Protocol and there is an “Initiate RT Inhaler-Nebulizer Bronchodilator Protocol” order as well (see protocol at bottom of note).    CXR Findings:  XR CHEST PORTABLE    Result Date: 5/2/2024  1. No acute cardiopulmonary process demonstrated. This document has been electronically signed by: Toro Villeda MD on 05/02/2024 09:31 PM      The findings from the last RT Protocol Assessment were as follows:   History Pulmonary Disease: Smoker 15 pack years or more  Respiratory Pattern: Regular pattern and RR 12-20 bpm  Breath Sounds: Clear breath sounds  Cough: Strong, spontaneous, non-productive  Indication for Bronchodilator Therapy:    Bronchodilator Assessment Score: 1    Aerosolized bronchodilator medication orders have been revised according to the RT Inhaler-Nebulizer Bronchodilator Protocol below.    Respiratory Therapist to perform RT Therapy Protocol Assessment initially then follow the protocol.  Repeat RT Therapy Protocol Assessment PRN for score 0-3 or on second treatment, BID, and PRN for scores above 3.    No Indications - adjust the frequency to every 6 hours PRN wheezing or bronchospasm, if no treatments needed after 48 hours then discontinue using Per Protocol order mode.     If indication present, adjust the RT bronchodilator orders based on the Bronchodilator Assessment Score as indicated below.  Use Inhaler orders unless patient has one or more of the following: on home nebulizer, not able to hold breath for 10 seconds, is not alert and oriented, cannot activate and use MDI correctly, or respiratory rate 25 breaths per minute or more, then use the equivalent nebulizer order(s) with same Frequency and PRN reasons based on the score.  If a patient is on this medication at home then do not decrease Frequency below that used at home.    0-3 - enter or

## 2024-05-04 NOTE — PLAN OF CARE
Problem: Respiratory - Adult  Goal: Clear lung sounds  Outcome: Progressing   Continue therapy as ordered to achieve and maintain clear breath sounds and improve aeration.

## 2024-05-05 LAB
ANION GAP SERPL CALC-SCNC: 11 MEQ/L (ref 8–16)
BUN SERPL-MCNC: 18 MG/DL (ref 7–22)
CALCIUM SERPL-MCNC: 8.5 MG/DL (ref 8.5–10.5)
CHLORIDE SERPL-SCNC: 101 MEQ/L (ref 98–111)
CO2 SERPL-SCNC: 24 MEQ/L (ref 23–33)
CREAT SERPL-MCNC: 0.9 MG/DL (ref 0.4–1.2)
DEPRECATED RDW RBC AUTO: 53.2 FL (ref 35–45)
EKG Q-T INTERVAL: 336 MS
EKG QRS DURATION: 90 MS
EKG QTC CALCULATION (BAZETT): 411 MS
EKG R AXIS: 18 DEGREES
EKG T AXIS: 63 DEGREES
EKG VENTRICULAR RATE: 90 BPM
ERYTHROCYTE [DISTWIDTH] IN BLOOD BY AUTOMATED COUNT: 17.2 % (ref 11.5–14.5)
GFR SERPL CREATININE-BSD FRML MDRD: > 90 ML/MIN/1.73M2
GLUCOSE SERPL-MCNC: 121 MG/DL (ref 70–108)
HCT VFR BLD AUTO: 52.9 % (ref 42–52)
HGB BLD-MCNC: 16.6 GM/DL (ref 14–18)
INR PPP: 2.11 (ref 0.85–1.13)
MCH RBC QN AUTO: 28.6 PG (ref 26–33)
MCHC RBC AUTO-ENTMCNC: 31.4 GM/DL (ref 32.2–35.5)
MCV RBC AUTO: 91 FL (ref 80–94)
PLATELET # BLD AUTO: 203 THOU/MM3 (ref 130–400)
PMV BLD AUTO: 9.5 FL (ref 9.4–12.4)
POTASSIUM SERPL-SCNC: 4.5 MEQ/L (ref 3.5–5.2)
RBC # BLD AUTO: 5.81 MILL/MM3 (ref 4.7–6.1)
SODIUM SERPL-SCNC: 136 MEQ/L (ref 135–145)
WBC # BLD AUTO: 11.4 THOU/MM3 (ref 4.8–10.8)

## 2024-05-05 PROCEDURE — 85027 COMPLETE CBC AUTOMATED: CPT

## 2024-05-05 PROCEDURE — 6370000000 HC RX 637 (ALT 250 FOR IP)

## 2024-05-05 PROCEDURE — 6370000000 HC RX 637 (ALT 250 FOR IP): Performed by: PHARMACIST

## 2024-05-05 PROCEDURE — 2580000003 HC RX 258

## 2024-05-05 PROCEDURE — 94640 AIRWAY INHALATION TREATMENT: CPT

## 2024-05-05 PROCEDURE — 93005 ELECTROCARDIOGRAM TRACING: CPT

## 2024-05-05 PROCEDURE — 99255 IP/OBS CONSLTJ NEW/EST HI 80: CPT | Performed by: NUCLEAR MEDICINE

## 2024-05-05 PROCEDURE — 85610 PROTHROMBIN TIME: CPT

## 2024-05-05 PROCEDURE — 93010 ELECTROCARDIOGRAM REPORT: CPT | Performed by: INTERNAL MEDICINE

## 2024-05-05 PROCEDURE — 80048 BASIC METABOLIC PNL TOTAL CA: CPT

## 2024-05-05 PROCEDURE — 2140000000 HC CCU INTERMEDIATE R&B

## 2024-05-05 PROCEDURE — 36415 COLL VENOUS BLD VENIPUNCTURE: CPT

## 2024-05-05 PROCEDURE — 99233 SBSQ HOSP IP/OBS HIGH 50: CPT | Performed by: HOSPITALIST

## 2024-05-05 RX ORDER — WARFARIN SODIUM 7.5 MG/1
7.5 TABLET ORAL ONCE
Status: COMPLETED | OUTPATIENT
Start: 2024-05-05 | End: 2024-05-05

## 2024-05-05 RX ADMIN — METOPROLOL TARTRATE 100 MG: 50 TABLET, FILM COATED ORAL at 20:33

## 2024-05-05 RX ADMIN — DILTIAZEM HYDROCHLORIDE 30 MG: 30 TABLET, FILM COATED ORAL at 23:17

## 2024-05-05 RX ADMIN — BUSPIRONE HYDROCHLORIDE 10 MG: 10 TABLET ORAL at 08:02

## 2024-05-05 RX ADMIN — DILTIAZEM HYDROCHLORIDE 30 MG: 30 TABLET, FILM COATED ORAL at 12:08

## 2024-05-05 RX ADMIN — VALSARTAN 80 MG: 80 TABLET ORAL at 08:02

## 2024-05-05 RX ADMIN — DILTIAZEM HYDROCHLORIDE 30 MG: 30 TABLET, FILM COATED ORAL at 17:07

## 2024-05-05 RX ADMIN — WARFARIN SODIUM 7.5 MG: 7.5 TABLET ORAL at 17:07

## 2024-05-05 RX ADMIN — METOPROLOL TARTRATE 100 MG: 50 TABLET, FILM COATED ORAL at 08:02

## 2024-05-05 RX ADMIN — SODIUM CHLORIDE, PRESERVATIVE FREE 10 ML: 5 INJECTION INTRAVENOUS at 20:33

## 2024-05-05 RX ADMIN — MOMETASONE FUROATE AND FORMOTEROL FUMARATE DIHYDRATE 2 PUFF: 200; 5 AEROSOL RESPIRATORY (INHALATION) at 09:15

## 2024-05-05 RX ADMIN — ATORVASTATIN CALCIUM 10 MG: 10 TABLET, FILM COATED ORAL at 08:02

## 2024-05-05 RX ADMIN — BUSPIRONE HYDROCHLORIDE 10 MG: 10 TABLET ORAL at 20:33

## 2024-05-05 RX ADMIN — ESCITALOPRAM OXALATE 20 MG: 20 TABLET ORAL at 08:02

## 2024-05-05 RX ADMIN — MOMETASONE FUROATE AND FORMOTEROL FUMARATE DIHYDRATE 2 PUFF: 200; 5 AEROSOL RESPIRATORY (INHALATION) at 20:36

## 2024-05-05 NOTE — CONSULTS
[place] [id number]                              CONSULTATION      PATIENT NAME: JOSE PATEL              : 1970  MED REC NO: [id number]                       ROOM: [facility]  ACCOUNT NO: [id number]                       ADMIT DATE: 2024  PROVIDER: Karrie Menard MD    CARDIAC CONSULT    CONSULT DATE:  2024    REFERRING PHYSICIAN:  Hospitalist Service      REASON FOR CONSULTATION:  Atrial fibrillation.    HISTORY OF PRESENT ILLNESS:  Pleasant 53-year-old gentleman, history of sleep apnea, on CPAP, hypertension, hyperlipidemia, and morbid obesity, who apparently had atrial fibrillation episode back in , was seen by Dr. Pineda, underwent cardioversion back then, and then followed up until last year where he stayed in sinus rhythm.  He does have multiple risk factors for coronary artery disease, however, has not had any diagnosed coronary artery disease.  He comes in with another episode of palpitations associated with shortness of breath and was found to be in atrial fibrillation, started on heparin, Cardizem, and we were consulted to assist in the management of the patient.  Denies any active chest pain.  Does have some baseline shortness of breath and history of sleep apnea.  Does have multiple risk factors for coronary artery disease as outlined above.    REVIEW OF SYSTEMS:  1. No fever, chills, weight loss.  2. No hematuria or dysuria.  3. No abdominal pain, nausea, vomiting, diarrhea.  4. No obvious active psych problems or suicidal ideation.  5. No skin rashes.  6. No obvious dizziness, lightheadedness, or loss of consciousness.  7. No recent trauma.  8. No bleeding problem.   9. No HEENT related problem.    PAST MEDICAL HISTORY:    1. History of pulmonary embolus.  2. Atrial fibrillation.  3. Sleep apnea.  4. Hypertension.  5. Hyperlipidemia.    ALLERGIES:  NO KNOWN DRUG ALLERGIES.      CURRENT MEDICATIONS:

## 2024-05-06 ENCOUNTER — APPOINTMENT (OUTPATIENT)
Age: 54
DRG: 309 | End: 2024-05-06
Attending: INTERNAL MEDICINE
Payer: COMMERCIAL

## 2024-05-06 ENCOUNTER — TELEPHONE (OUTPATIENT)
Dept: FAMILY MEDICINE CLINIC | Age: 54
End: 2024-05-06

## 2024-05-06 ENCOUNTER — ANESTHESIA EVENT (OUTPATIENT)
Age: 54
DRG: 309 | End: 2024-05-06
Payer: COMMERCIAL

## 2024-05-06 ENCOUNTER — ANESTHESIA (OUTPATIENT)
Age: 54
DRG: 309 | End: 2024-05-06
Payer: COMMERCIAL

## 2024-05-06 LAB
ANION GAP SERPL CALC-SCNC: 11 MEQ/L (ref 8–16)
APTT PPP: 42.5 SECONDS (ref 22–38)
BUN SERPL-MCNC: 17 MG/DL (ref 7–22)
CALCIUM SERPL-MCNC: 8.8 MG/DL (ref 8.5–10.5)
CHLORIDE SERPL-SCNC: 101 MEQ/L (ref 98–111)
CO2 SERPL-SCNC: 27 MEQ/L (ref 23–33)
CREAT SERPL-MCNC: 1 MG/DL (ref 0.4–1.2)
DEPRECATED RDW RBC AUTO: 54 FL (ref 35–45)
ECHO BSA: 2.88 M2
ECHO BSA: 2.88 M2
EKG ATRIAL RATE: 70 BPM
EKG P AXIS: 57 DEGREES
EKG P-R INTERVAL: 174 MS
EKG Q-T INTERVAL: 362 MS
EKG QRS DURATION: 86 MS
EKG QTC CALCULATION (BAZETT): 390 MS
EKG R AXIS: 53 DEGREES
EKG T AXIS: 61 DEGREES
EKG VENTRICULAR RATE: 70 BPM
ERYTHROCYTE [DISTWIDTH] IN BLOOD BY AUTOMATED COUNT: 17.2 % (ref 11.5–14.5)
GFR SERPL CREATININE-BSD FRML MDRD: 90 ML/MIN/1.73M2
GLUCOSE SERPL-MCNC: 104 MG/DL (ref 70–108)
HCT VFR BLD AUTO: 53.3 % (ref 42–52)
HEPARIN UNFRACTIONATED: < 0.04 U/ML (ref 0.3–0.7)
HGB BLD-MCNC: 16.8 GM/DL (ref 14–18)
INR PPP: 2.31 (ref 0.85–1.13)
MCH RBC QN AUTO: 29 PG (ref 26–33)
MCHC RBC AUTO-ENTMCNC: 31.5 GM/DL (ref 32.2–35.5)
MCV RBC AUTO: 92.1 FL (ref 80–94)
PLATELET # BLD AUTO: 217 THOU/MM3 (ref 130–400)
PMV BLD AUTO: 10.3 FL (ref 9.4–12.4)
POTASSIUM SERPL-SCNC: 4.9 MEQ/L (ref 3.5–5.2)
RBC # BLD AUTO: 5.79 MILL/MM3 (ref 4.7–6.1)
SODIUM SERPL-SCNC: 139 MEQ/L (ref 135–145)
WBC # BLD AUTO: 11.1 THOU/MM3 (ref 4.8–10.8)

## 2024-05-06 PROCEDURE — 93325 DOPPLER ECHO COLOR FLOW MAPG: CPT | Performed by: INTERNAL MEDICINE

## 2024-05-06 PROCEDURE — 80048 BASIC METABOLIC PNL TOTAL CA: CPT

## 2024-05-06 PROCEDURE — 6370000000 HC RX 637 (ALT 250 FOR IP): Performed by: PHYSICIAN ASSISTANT

## 2024-05-06 PROCEDURE — 92960 CARDIOVERSION ELECTRIC EXT: CPT | Performed by: INTERNAL MEDICINE

## 2024-05-06 PROCEDURE — 93312 ECHO TRANSESOPHAGEAL: CPT

## 2024-05-06 PROCEDURE — 93005 ELECTROCARDIOGRAM TRACING: CPT | Performed by: INTERNAL MEDICINE

## 2024-05-06 PROCEDURE — 6370000000 HC RX 637 (ALT 250 FOR IP)

## 2024-05-06 PROCEDURE — 2140000000 HC CCU INTERMEDIATE R&B

## 2024-05-06 PROCEDURE — 6360000002 HC RX W HCPCS: Performed by: NURSE ANESTHETIST, CERTIFIED REGISTERED

## 2024-05-06 PROCEDURE — 94640 AIRWAY INHALATION TREATMENT: CPT

## 2024-05-06 PROCEDURE — 99232 SBSQ HOSP IP/OBS MODERATE 35: CPT | Performed by: PHYSICIAN ASSISTANT

## 2024-05-06 PROCEDURE — 85610 PROTHROMBIN TIME: CPT

## 2024-05-06 PROCEDURE — 93312 ECHO TRANSESOPHAGEAL: CPT | Performed by: INTERNAL MEDICINE

## 2024-05-06 PROCEDURE — 2580000003 HC RX 258

## 2024-05-06 PROCEDURE — 6370000000 HC RX 637 (ALT 250 FOR IP): Performed by: INTERNAL MEDICINE

## 2024-05-06 PROCEDURE — 6370000000 HC RX 637 (ALT 250 FOR IP): Performed by: PHARMACIST

## 2024-05-06 PROCEDURE — B24BZZ4 ULTRASONOGRAPHY OF HEART WITH AORTA, TRANSESOPHAGEAL: ICD-10-PCS | Performed by: INTERNAL MEDICINE

## 2024-05-06 PROCEDURE — 93320 DOPPLER ECHO COMPLETE: CPT | Performed by: INTERNAL MEDICINE

## 2024-05-06 PROCEDURE — 3700000000 HC ANESTHESIA ATTENDED CARE: Performed by: INTERNAL MEDICINE

## 2024-05-06 PROCEDURE — 85520 HEPARIN ASSAY: CPT

## 2024-05-06 PROCEDURE — 5A2204Z RESTORATION OF CARDIAC RHYTHM, SINGLE: ICD-10-PCS | Performed by: INTERNAL MEDICINE

## 2024-05-06 PROCEDURE — 93010 ELECTROCARDIOGRAM REPORT: CPT | Performed by: INTERNAL MEDICINE

## 2024-05-06 PROCEDURE — 3700000001 HC ADD 15 MINUTES (ANESTHESIA): Performed by: INTERNAL MEDICINE

## 2024-05-06 PROCEDURE — 85027 COMPLETE CBC AUTOMATED: CPT

## 2024-05-06 PROCEDURE — 99233 SBSQ HOSP IP/OBS HIGH 50: CPT | Performed by: HOSPITALIST

## 2024-05-06 PROCEDURE — 99152 MOD SED SAME PHYS/QHP 5/>YRS: CPT | Performed by: INTERNAL MEDICINE

## 2024-05-06 PROCEDURE — 36415 COLL VENOUS BLD VENIPUNCTURE: CPT

## 2024-05-06 PROCEDURE — 92960 CARDIOVERSION ELECTRIC EXT: CPT

## 2024-05-06 PROCEDURE — 2500000003 HC RX 250 WO HCPCS: Performed by: NURSE ANESTHETIST, CERTIFIED REGISTERED

## 2024-05-06 PROCEDURE — 85730 THROMBOPLASTIN TIME PARTIAL: CPT

## 2024-05-06 RX ORDER — DILTIAZEM HYDROCHLORIDE 120 MG/1
120 CAPSULE, COATED, EXTENDED RELEASE ORAL DAILY
Status: DISCONTINUED | OUTPATIENT
Start: 2024-05-06 | End: 2024-05-07 | Stop reason: HOSPADM

## 2024-05-06 RX ORDER — SODIUM CHLORIDE 0.9 % (FLUSH) 0.9 %
5-40 SYRINGE (ML) INJECTION EVERY 12 HOURS SCHEDULED
Status: DISCONTINUED | OUTPATIENT
Start: 2024-05-06 | End: 2024-05-06

## 2024-05-06 RX ORDER — SODIUM CHLORIDE 0.9 % (FLUSH) 0.9 %
5-40 SYRINGE (ML) INJECTION PRN
Status: DISCONTINUED | OUTPATIENT
Start: 2024-05-06 | End: 2024-05-06

## 2024-05-06 RX ORDER — PROPOFOL 10 MG/ML
INJECTION, EMULSION INTRAVENOUS PRN
Status: DISCONTINUED | OUTPATIENT
Start: 2024-05-06 | End: 2024-05-06 | Stop reason: SDUPTHER

## 2024-05-06 RX ORDER — METOPROLOL TARTRATE 50 MG/1
50 TABLET, FILM COATED ORAL 2 TIMES DAILY
Status: DISCONTINUED | OUTPATIENT
Start: 2024-05-06 | End: 2024-05-07 | Stop reason: HOSPADM

## 2024-05-06 RX ORDER — SODIUM CHLORIDE 9 MG/ML
INJECTION, SOLUTION INTRAVENOUS PRN
Status: DISCONTINUED | OUTPATIENT
Start: 2024-05-06 | End: 2024-05-06

## 2024-05-06 RX ORDER — WARFARIN SODIUM 10 MG/1
10 TABLET ORAL ONCE
Status: COMPLETED | OUTPATIENT
Start: 2024-05-06 | End: 2024-05-06

## 2024-05-06 RX ORDER — MIDAZOLAM HYDROCHLORIDE 1 MG/ML
INJECTION INTRAMUSCULAR; INTRAVENOUS PRN
Status: DISCONTINUED | OUTPATIENT
Start: 2024-05-06 | End: 2024-05-06 | Stop reason: SDUPTHER

## 2024-05-06 RX ORDER — KETAMINE HCL IN NACL, ISO-OSM 100MG/10ML
SYRINGE (ML) INJECTION PRN
Status: DISCONTINUED | OUTPATIENT
Start: 2024-05-06 | End: 2024-05-06 | Stop reason: SDUPTHER

## 2024-05-06 RX ORDER — LIDOCAINE HYDROCHLORIDE 20 MG/ML
INJECTION, SOLUTION INFILTRATION; PERINEURAL PRN
Status: DISCONTINUED | OUTPATIENT
Start: 2024-05-06 | End: 2024-05-06 | Stop reason: SDUPTHER

## 2024-05-06 RX ORDER — FLECAINIDE ACETATE 50 MG/1
50 TABLET ORAL 2 TIMES DAILY
Status: DISCONTINUED | OUTPATIENT
Start: 2024-05-06 | End: 2024-05-07 | Stop reason: HOSPADM

## 2024-05-06 RX ADMIN — LIDOCAINE HYDROCHLORIDE 100 MG: 20 INJECTION, SOLUTION INFILTRATION; PERINEURAL at 08:07

## 2024-05-06 RX ADMIN — Medication 50 MG: at 08:08

## 2024-05-06 RX ADMIN — BUSPIRONE HYDROCHLORIDE 10 MG: 10 TABLET ORAL at 09:51

## 2024-05-06 RX ADMIN — SODIUM CHLORIDE, PRESERVATIVE FREE 10 ML: 5 INJECTION INTRAVENOUS at 20:22

## 2024-05-06 RX ADMIN — METOPROLOL TARTRATE 50 MG: 50 TABLET, FILM COATED ORAL at 20:22

## 2024-05-06 RX ADMIN — SODIUM CHLORIDE, PRESERVATIVE FREE 10 ML: 5 INJECTION INTRAVENOUS at 09:52

## 2024-05-06 RX ADMIN — ESCITALOPRAM OXALATE 20 MG: 20 TABLET ORAL at 09:51

## 2024-05-06 RX ADMIN — PROPOFOL 50 MG: 10 INJECTION, EMULSION INTRAVENOUS at 08:09

## 2024-05-06 RX ADMIN — ATORVASTATIN CALCIUM 10 MG: 10 TABLET, FILM COATED ORAL at 09:51

## 2024-05-06 RX ADMIN — FLECAINIDE ACETATE 50 MG: 50 TABLET ORAL at 09:51

## 2024-05-06 RX ADMIN — MIDAZOLAM 2 MG: 1 INJECTION INTRAMUSCULAR; INTRAVENOUS at 08:06

## 2024-05-06 RX ADMIN — DILTIAZEM HYDROCHLORIDE 30 MG: 30 TABLET, FILM COATED ORAL at 06:25

## 2024-05-06 RX ADMIN — FLECAINIDE ACETATE 50 MG: 50 TABLET ORAL at 20:22

## 2024-05-06 RX ADMIN — SODIUM CHLORIDE: 9 INJECTION, SOLUTION INTRAVENOUS at 08:06

## 2024-05-06 RX ADMIN — BUSPIRONE HYDROCHLORIDE 10 MG: 10 TABLET ORAL at 20:22

## 2024-05-06 RX ADMIN — WARFARIN SODIUM 10 MG: 10 TABLET ORAL at 17:15

## 2024-05-06 RX ADMIN — MOMETASONE FUROATE AND FORMOTEROL FUMARATE DIHYDRATE 2 PUFF: 200; 5 AEROSOL RESPIRATORY (INHALATION) at 17:07

## 2024-05-06 NOTE — ANESTHESIA POSTPROCEDURE EVALUATION
Department of Anesthesiology  Postprocedure Note    Patient: Luis Palmer  MRN: [id number]  YOB: 1970  Date of evaluation: 5/6/2024    Procedure Summary       Date: 05/06/24 Room / Location: [facility] Cardiac Cath Lab; [facility] Noninvasive Cardiology    Anesthesia Start: 0806 Anesthesia Stop:     Procedure: DOE W/ POSSIBLE CARDIOVERSION (PRN CONTRAST/BUBBLE/3D) Diagnosis: Atrial fibrillation with RVR (HCC)    Scheduled Providers: Ralph Foy MD Responsible Provider: Mitesh Giraldo DO    Anesthesia Type: MAC ASA Status: 3            Anesthesia Type: MAC    Uzair Phase I: Uzair Score: 10    Uzair Phase II:      Anesthesia Post Evaluation    Patient location during evaluation: bedside  Patient participation: complete - patient participated  Level of consciousness: awake and alert  Pain score: 0  Airway patency: patent  Nausea & Vomiting: no vomiting and no nausea  Cardiovascular status: blood pressure returned to baseline  Respiratory status: acceptable and nasal cannula  Hydration status: stable  Pain management: adequate    No notable events documented.

## 2024-05-06 NOTE — BRIEF OP NOTE
[facility]  Sedation/Analgesia Post Sedation Record    Pt Name: Luis Palmer  Account number: [id number]  MRN: [id number]  YOB: 1970  Procedure Performed By: Ralph Foy MD MD   Primary Care Physician: Leopold, Katelyn Ann, MD  Date: 5/6/2024    POST-PROCEDURE    Physicians/Assistants: Ralph Foy MD MD     Procedure Performed: DOE/Cardioversion        Sedation/Anesthesia: per anesthesia.      Estimated Blood Loss: none    Specimens Removed: None         Disposition of Specimen: N/A        Complications: No Immediate Complications.       Post-procedure Diagnosis/Findings:       Successful DOE guided cardioversion      Recommendations:  Continue telemetry   Continue metoprolol   Change Cardizem to Cardizem  mg po daily   Start Flecainide 50 mg po BID  Continue with anticoagulation (on Coumadin), target INR = 2-3  Outpatient follow up with primary cardiologist      Above findings and plan of care were discussed with patient, questions were answered, agreeable with plan.       Ralph Foy MD, FACC, [facility]   Electronically signed 5/6/2024 at 8:21 AM  Interventional Cardiology

## 2024-05-06 NOTE — PLAN OF CARE
Problem: Discharge Planning  Goal: Discharge to home or other facility with appropriate resources  5/6/2024 1316 by Kylee Hay, RN  Outcome: Progressing  Note: From home       Problem: Safety - Adult  Goal: Free from fall injury  5/6/2024 1316 by Kylee Hay, RN  Outcome: Progressing  Flowsheets (Taken 5/6/2024 1315)  Free From Fall Injury: Instruct family/caregiver on patient safety  Note: Bed locked & in low position, call light in reach, side-rails up x2, bed/chair alarm utilized, non-slip socks on when ambulating, reminded patient to use call light to call for assistance.      Problem: Chronic Conditions and Co-morbidities  Goal: Patient's chronic conditions and co-morbidity symptoms are monitored and maintained or improved  5/6/2024 1316 by Kylee Hay, RN  Outcome: Progressing  Flowsheets (Taken 5/6/2024 0551 by Mary Antonio, RN)  Care Plan - Patient's Chronic Conditions and Co-Morbidity Symptoms are Monitored and Maintained or Improved: Monitor and assess patient's chronic conditions and comorbid symptoms for stability, deterioration, or improvement     Problem: ABCDS Injury Assessment  Goal: Absence of physical injury  Outcome: Progressing     Problem: Cardiovascular - Adult  Goal: Maintains optimal cardiac output and hemodynamic stability  Outcome: Progressing  Flowsheets (Taken 5/6/2024 1316)  Maintains optimal cardiac output and hemodynamic stability: Monitor blood pressure and heart rate  Note: CV today    Care plan reviewed with patient.  Patient verbalizes understanding of the care plan and contributed to goal setting.

## 2024-05-06 NOTE — DISCHARGE INSTRUCTIONS
Follow up dr grande 2-4 weeks.    STOP COUMADIN.  On Thursday, get blood drawn for INR. Please call your family doctor or check on mychart before starting Eliquis 5 mg twice daily. Do NOT take Eliquis until INR lab test is below 2.0. If INR is not below 2.0 on Thursday, have it redrawn on Friday. If you have any questions please call the hospital and ask to speak with Dr. Sam.     Start taking Diltizem 120 mg once daily. Also start Flecainide 50 mg twice daily. Your dose of Lopressor (metoprolol) has been increased to 50 mg twice daily.

## 2024-05-06 NOTE — ANESTHESIA PRE PROCEDURE
Department of Anesthesiology  Preprocedure Note       Name:  Luis Palmer   Age:  53 y.o.  :  1970                                          MRN:  [id number]         Date:  2024      Surgeon: * Surgery not found *    Procedure:     Medications prior to admission:   Prior to Admission medications    Medication Sig Start Date End Date Taking? Authorizing Provider   metFORMIN (GLUCOPHAGE) 500 MG tablet TAKE 1 TABLET BY MOUTH TWICE DAILY WITH MEALS 24   Ellen Walls MD   warfarin (COUMADIN) 5 MG tablet 10mg (2 tablets) po M,W,F,Sat,Sun; 7.5mg (1.5 tablets) po T,Th 3/14/24   Ellen Walls MD   simvastatin (ZOCOR) 20 MG tablet Take 1 tablet by mouth once daily 24   Orlando Walls MD   busPIRone (BUSPAR) 10 MG tablet Take 1 tablet by mouth twice daily 24   Orlando Walls MD   valsartan (DIOVAN) 80 MG tablet Take 1 tablet by mouth once daily 24   Leopold, Katelyn Ann, MD   escitalopram (LEXAPRO) 20 MG tablet Take 1 tablet by mouth once daily 24   Leopold, Katelyn Ann, MD   metoprolol tartrate (LOPRESSOR) 25 MG tablet Take 1 tablet by mouth twice daily 24   Leopold, Katelyn Ann, MD   fluticasone-salmeterol (ADVAIR) 250-50 MCG/ACT AEPB diskus inhaler Inhale 1 puff into the lungs 2 times daily 23   Leopold, Katelyn Ann, MD   albuterol sulfate HFA (PROVENTIL;VENTOLIN;PROAIR) 108 (90 Base) MCG/ACT inhaler Inhale 2 puffs into the lungs every 6 hours as needed for Wheezing or Shortness of Breath 23   Leopold, Katelyn Ann, MD       Current medications:    Current Facility-Administered Medications   Medication Dose Route Frequency Provider Last Rate Last Admin    warfarin (COUMADIN) tablet 10 mg  10 mg Oral Once Kaila Yang, [facility]        dilTIAZem (CARDIZEM) tablet 30 mg  30 mg Oral 4 times per day Christian Sam DO   30 mg at 24 0625    metoprolol (LOPRESSOR) tablet 100 mg  100 mg Oral BID Abdiel Chong DO   100 mg at 24    valsartan (DIOVAN) tablet

## 2024-05-06 NOTE — TELEPHONE ENCOUNTER
I called to check on patient since being in the hospital and if there was anything we could do for him at this time. Patient states he is doing well. Pt just had cardioversion and will have to stay one more night to monitor. Hospital follow appointment made with Dr Leopold. Pt will call if he needs anything prior to appt.

## 2024-05-06 NOTE — H&P
[facility]  Sedation/Analgesia History & Physical    Pt Name: Luis Palmer  Account number: [id number]  MRN: [id number]  YOB: 1970  Provider Performing Procedure: Ralph Foy MD MD  Referring Provider: Arnaud Clarke MD   Primary Care Physician: Leopold, Katelyn Ann, MD  Date: 5/6/2024    PRE-PROCEDURE    Code Status: FULL CODE  Brief History/Pre-Procedure Diagnosis:    Atrial fibrillation   Consent: : I have discussed with the patient risks, benefits, and alternatives (and relevant risks, benefits, and side effects related to alternatives or not receiving care), and likelihood of the success.   The patient and/or representative appear to understand and agree to proceed.  The discussion encompasses risks, benefits, and side effects related to the alternatives and the risks related to not receiving the proposed care, treatment, and services.     The indication, risks and benefits of the procedure and possible therapeutic consequences and alternatives were discussed with the patient. The patient was given the opportunity to ask questions and to have them answered to his/her satisfaction. Risks of the procedure include but are not limited to the following: Bleeding, hematoma including retroperitoneal hemmorhage, infection, pain and discomfort, injury to the aorta and other blood vessels, rhythm disturbance, low blood pressure, myocardial infarction, stroke, kidney damage/failure, myocardial perforation, allergic reactions to sedatives/contrast material, loss of pulse/vascular compromise requiring surgery, aneurysm/pseudoaneurysm formation, possible loss of a limb/hand/leg, needing blood transfusion, requiring emergent open heart surgery or emergent coronary intervention, the need for intubation/respiratory support, the requirement for defibrillation/cardioversion, and death. Alternatives to and omission of the suggested procedure were discussed. The patient had no further

## 2024-05-06 NOTE — PLAN OF CARE
Problem: Discharge Planning  Goal: Discharge to home or other facility with appropriate resources  Outcome: Progressing  Flowsheets  Taken 5/6/2024 0551  Discharge to home or other facility with appropriate resources: Identify barriers to discharge with patient and caregiver  Taken 5/5/2024 2015  Discharge to home or other facility with appropriate resources: Identify barriers to discharge with patient and caregiver     Problem: Safety - Adult  Goal: Free from fall injury  Outcome: Progressing  Flowsheets (Taken 5/6/2024 0551)  Free From Fall Injury: Instruct family/caregiver on patient safety     Problem: Chronic Conditions and Co-morbidities  Goal: Patient's chronic conditions and co-morbidity symptoms are monitored and maintained or improved  Outcome: Progressing  Flowsheets  Taken 5/6/2024 0551  Care Plan - Patient's Chronic Conditions and Co-Morbidity Symptoms are Monitored and Maintained or Improved: Monitor and assess patient's chronic conditions and comorbid symptoms for stability, deterioration, or improvement  Taken 5/5/2024 2015  Care Plan - Patient's Chronic Conditions and Co-Morbidity Symptoms are Monitored and Maintained or Improved: Monitor and assess patient's chronic conditions and comorbid symptoms for stability, deterioration, or improvement

## 2024-05-07 VITALS
HEART RATE: 59 BPM | DIASTOLIC BLOOD PRESSURE: 67 MMHG | TEMPERATURE: 97.4 F | SYSTOLIC BLOOD PRESSURE: 119 MMHG | BODY MASS INDEX: 41.75 KG/M2 | WEIGHT: 315 LBS | RESPIRATION RATE: 16 BRPM | HEIGHT: 73 IN | OXYGEN SATURATION: 97 %

## 2024-05-07 LAB
ANION GAP SERPL CALC-SCNC: 7 MEQ/L (ref 8–16)
BUN SERPL-MCNC: 16 MG/DL (ref 7–22)
CALCIUM SERPL-MCNC: 8.4 MG/DL (ref 8.5–10.5)
CHLORIDE SERPL-SCNC: 104 MEQ/L (ref 98–111)
CO2 SERPL-SCNC: 25 MEQ/L (ref 23–33)
CREAT SERPL-MCNC: 0.9 MG/DL (ref 0.4–1.2)
DEPRECATED RDW RBC AUTO: 53.8 FL (ref 35–45)
ERYTHROCYTE [DISTWIDTH] IN BLOOD BY AUTOMATED COUNT: 16 % (ref 11.5–14.5)
GFR SERPL CREATININE-BSD FRML MDRD: > 90 ML/MIN/1.73M2
GLUCOSE SERPL-MCNC: 99 MG/DL (ref 70–108)
HCT VFR BLD AUTO: 49.4 % (ref 42–52)
HGB BLD-MCNC: 15.5 GM/DL (ref 14–18)
INR PPP: 2.61 (ref 0.85–1.13)
MCH RBC QN AUTO: 28.6 PG (ref 26–33)
MCHC RBC AUTO-ENTMCNC: 31.4 GM/DL (ref 32.2–35.5)
MCV RBC AUTO: 91.1 FL (ref 80–94)
PLATELET # BLD AUTO: 185 THOU/MM3 (ref 130–400)
PMV BLD AUTO: 10 FL (ref 9.4–12.4)
POTASSIUM SERPL-SCNC: 4.2 MEQ/L (ref 3.5–5.2)
RBC # BLD AUTO: 5.42 MILL/MM3 (ref 4.7–6.1)
SODIUM SERPL-SCNC: 136 MEQ/L (ref 135–145)
WBC # BLD AUTO: 10.2 THOU/MM3 (ref 4.8–10.8)

## 2024-05-07 PROCEDURE — 94640 AIRWAY INHALATION TREATMENT: CPT

## 2024-05-07 PROCEDURE — 2580000003 HC RX 258

## 2024-05-07 PROCEDURE — 99232 SBSQ HOSP IP/OBS MODERATE 35: CPT | Performed by: PHYSICIAN ASSISTANT

## 2024-05-07 PROCEDURE — 36415 COLL VENOUS BLD VENIPUNCTURE: CPT

## 2024-05-07 PROCEDURE — 6370000000 HC RX 637 (ALT 250 FOR IP): Performed by: INTERNAL MEDICINE

## 2024-05-07 PROCEDURE — 85027 COMPLETE CBC AUTOMATED: CPT

## 2024-05-07 PROCEDURE — 94761 N-INVAS EAR/PLS OXIMETRY MLT: CPT

## 2024-05-07 PROCEDURE — 99239 HOSP IP/OBS DSCHRG MGMT >30: CPT | Performed by: HOSPITALIST

## 2024-05-07 PROCEDURE — 80048 BASIC METABOLIC PNL TOTAL CA: CPT

## 2024-05-07 PROCEDURE — 6370000000 HC RX 637 (ALT 250 FOR IP): Performed by: PHYSICIAN ASSISTANT

## 2024-05-07 PROCEDURE — 85610 PROTHROMBIN TIME: CPT

## 2024-05-07 PROCEDURE — 6370000000 HC RX 637 (ALT 250 FOR IP)

## 2024-05-07 RX ORDER — METOPROLOL TARTRATE 50 MG/1
50 TABLET, FILM COATED ORAL 2 TIMES DAILY
Qty: 60 TABLET | Refills: 3 | Status: SHIPPED | OUTPATIENT
Start: 2024-05-07

## 2024-05-07 RX ORDER — WARFARIN SODIUM 7.5 MG/1
7.5 TABLET ORAL
Status: DISCONTINUED | OUTPATIENT
Start: 2024-05-07 | End: 2024-05-07 | Stop reason: HOSPADM

## 2024-05-07 RX ORDER — FLECAINIDE ACETATE 50 MG/1
50 TABLET ORAL 2 TIMES DAILY
Qty: 60 TABLET | Refills: 3 | Status: SHIPPED | OUTPATIENT
Start: 2024-05-07

## 2024-05-07 RX ORDER — DILTIAZEM HYDROCHLORIDE 120 MG/1
120 CAPSULE, COATED, EXTENDED RELEASE ORAL DAILY
Qty: 30 CAPSULE | Refills: 3 | Status: SHIPPED | OUTPATIENT
Start: 2024-05-07

## 2024-05-07 RX ORDER — WARFARIN SODIUM 7.5 MG/1
TABLET ORAL
Qty: 30 TABLET | Refills: 3 | Status: CANCELLED | OUTPATIENT
Start: 2024-05-07

## 2024-05-07 RX ADMIN — SODIUM CHLORIDE, PRESERVATIVE FREE 10 ML: 5 INJECTION INTRAVENOUS at 07:39

## 2024-05-07 RX ADMIN — DILTIAZEM HYDROCHLORIDE 120 MG: 120 CAPSULE, EXTENDED RELEASE ORAL at 09:30

## 2024-05-07 RX ADMIN — MOMETASONE FUROATE AND FORMOTEROL FUMARATE DIHYDRATE 2 PUFF: 200; 5 AEROSOL RESPIRATORY (INHALATION) at 07:48

## 2024-05-07 RX ADMIN — ATORVASTATIN CALCIUM 10 MG: 10 TABLET, FILM COATED ORAL at 07:38

## 2024-05-07 RX ADMIN — FLECAINIDE ACETATE 50 MG: 50 TABLET ORAL at 07:38

## 2024-05-07 RX ADMIN — ESCITALOPRAM OXALATE 20 MG: 20 TABLET ORAL at 07:38

## 2024-05-07 RX ADMIN — METOPROLOL TARTRATE 50 MG: 50 TABLET, FILM COATED ORAL at 07:38

## 2024-05-07 RX ADMIN — BUSPIRONE HYDROCHLORIDE 10 MG: 10 TABLET ORAL at 07:38

## 2024-05-07 NOTE — PROGRESS NOTES
Internal Medicine Resident  Progress Note      Patient:  Luis Palmer    Unit/Bed:3B-25/025-A  YOB: 1970  MRN: [id number]   Acct: [id number]   PCP: Leopold, Katelyn Ann, MD  Date of Admission: 5/2/2024  Date of Service: Pt seen/examined on 05/05/24      Assessment/Plan:  Paroxysmal atrial fibrillation nonvalvular on warfarin: EKG on admission A-fib RVR.  IDL1SO3-AYDd score 2.  Rate control agents at home Lopressor 25 mg twice daily.  Hx of DOE-DCCV 2021 successful.  TSH this admission 1.82.  Echo shows normal left ventricular systolic function with a visually estimated EF of 55 to 60%.  Left ventricular size normal.  Normal wall thickness.  Normal wall motion.  Normal diastolic function.  Weaned off Cardizem gtt this morning   Started on Diltiazem 30 mg Q6 hour    Continue Lopressor 100 mg twice daily  Maintain magnesium>2.0, potassium> 4.0  Continue Coumadin, Heparin gtt Dc'd, INR now 2.11  Cardiology following, plan for Cardioversion tomorrow 05/06  Recommending antiarrythmic after cardioversion  Hypertension: Continue valsartan, metoprolol dose increased  Hyperlipidemia: Continue Zocor 20 mg daily  Obstructive sleep apnea: Patient to use CPAP machine during daytime naps while asleep.  Unspecified COPD: No formal PFTs on file, continue home inhalers  History of pulmonary embolism in 2021, noted. On Coumadin.   Non-insulin-dependent diabetes mellitus type 2: Last A1c 2/20/2024 was 6.4.  Takes metformin at home, held  Will do low-dose sliding scale insulin.  POCT glucose before every meal/at bedtime.  Morbid obesity: BMI 46.98, advised on lifestyle changes.  Anxiety/depression: Continue Lexapro BuSpar        Expected discharge date: TBD    Disposition:   [x] Home  [] Inpatient Rehab  [] Psychiatric Unit  [] SNF  [] Long Term Care Facility  [] Other-    ===================================================================      Chief Complaint: Palpitations    Hospital Course:    
                  Internal Medicine Resident  Progress Note      Patient:  Luis Palmer    Unit/Bed:3B-25/025-A  YOB: 1970  MRN: [id number]   Acct: [id number]   PCP: Leopold, Katelyn Ann, MD  Date of Admission: 5/2/2024  Date of Service: Pt seen/examined on 05/06/24      Assessment/Plan:  Paroxysmal atrial fibrillation nonvalvular on warfarin, s/p successful DOE with CV: EKG on admission A-fib RVR.  [id number]-[id number] score 2.  Rate control agents at home Lopressor 25 mg twice daily.  Hx of DOE-DCCV 2021 successful.  TSH this admission 1.82.  Echo shows normal left ventricular systolic function with a visually estimated EF of 55 to 60%.  Left ventricular size normal.  Normal wall thickness.  Normal wall motion.  Normal diastolic function.  Successful DOE with cardioversion this morning, now in normal sinus rhythm  Continue diltiazem 30 mg Q6 hour, tomorrow will transition to extended release  Continue Lopressor, decreased to 50 mg twice daily  Continue flecainide 50 mg twice daily  Maintain magnesium>2.0, potassium> 4.0  Continue Coumadin  Hypertension: Continue valsartan, metoprolol   Hyperlipidemia: Continue Zocor 20 mg daily  Obstructive sleep apnea: Patient to use CPAP machine during daytime naps while asleep.  Unspecified COPD: No formal PFTs on file, continue home inhalers  History of pulmonary embolism in 2021, noted. On Coumadin.   Non-insulin-dependent diabetes mellitus type 2: Last A1c 2/20/2024 was 6.4.  Takes metformin at home, held  Will do low-dose sliding scale insulin.  POCT glucose before every meal/at bedtime.  Morbid obesity: BMI 46.98, advised on lifestyle changes.  Anxiety/depression: Continue Lexapro BuSpar        Expected discharge date: TBD    Disposition:   [x] Home  [] Inpatient Rehab  [] Psychiatric Unit  [] SNF  [] Long Term Care Facility  [] Other-    ===================================================================      Chief Complaint: Palpitations    Hospital Course:    
  Physician Progress Note      PATIENT:               JOSE PATEL  CSN #:                  [id number]  :                       1970  ADMIT DATE:       2024 8:41 PM  DISCH DATE:  RESPONDING  PROVIDER #:        FARHANA ARAGON          QUERY TEXT:    Patient admitted with Afib with RVR , noted to have paroxysmal atrial   fibrillation,  history of PE   and is maintained on Coumadin. If possible,   please make selection below document in progress notes and discharge summary   if you are evaluating and/or treating any of the following:    The medical record reflects the following:  Risk Factors: Known history od afib, PE, and has HZP1BW2-XFUc score of 2  Clinical Indicators: H&P documentation A-fib RVR: [id number]-[id number] score of   2,Currently on anticoagulation with Coumadin  Treatment: Bleeding/clotting precautions, labs, medication review and   management    Thank you,  Deisy HENSON, RN, CRCR  Clinical   P: [phone]  F: [phone]  Options provided:  -- Secondary hypercoagulable state in a patient with atrial fibrillation  -- Other - I will add my own diagnosis  -- Disagree - Not applicable / Not valid  -- Disagree - Clinically unable to determine / Unknown  -- Refer to Clinical Documentation Reviewer    PROVIDER RESPONSE TEXT:    Provider disagreed with this query.    Query created by: Deisy Desir on 5/3/2024 1:00 PM      Electronically signed by:  FARHANA ARAGON 2024 10:20 AM          
[facility]   PROGRESS NOTE      Patient: Luis Palmer  Room #: 3B-25/025-A            YOB: 1970  Age: 53 y.o.  Gender: male            Admit Date & Time: 5/2/2024  8:41 PM    Assessment:    The patient declined a visit today.    Interventions:  The patient was provided information about Spiritual Care being available.     Outcomes:  The  wished the patient a positive day.     Plan:  1.Spiritual care will continue to follow the patient according to [facility] spiritual care SOP.       Electronically signed by Chaplain Melissa, on 5/6/2024 at 6:04 PM.  Spiritual Care Department  [facility]  [phone]     05/06/24 1803   Encounter Summary   Encounter Overview/Reason Initial Encounter   Service Provided For Patient   Referral/Consult From Rounding   Support System Unknown   Last Encounter  05/06/24   Complexity of Encounter Low   Begin Time 1740   End Time  1745   Total Time Calculated 5 min   Spiritual/Emotional needs   Type Spiritual Support   Assessment/Intervention/Outcome   Assessment Calm;Hopeful   Intervention Empowerment   Outcome Encouraged;Refused/Declined       
Cardiology Progress Note      Patient:  Luis Palmer  YOB: 1970  MRN: [id number]   Acct: [id number]  Admit Date:  5/2/2024  Primary Cardiologist: Jose Pineda MD    Note per dr paulino \"REASON FOR CONSULTATION:  Atrial fibrillation.     HISTORY OF PRESENT ILLNESS:  Pleasant 53-year-old gentleman, history of sleep apnea, on CPAP, hypertension, hyperlipidemia, and morbid obesity, who apparently had atrial fibrillation episode back in 2021, was seen by Dr. Pineda, underwent cardioversion back then, and then followed up until last year where he stayed in sinus rhythm.  He does have multiple risk factors for coronary artery disease, however, has not had any diagnosed coronary artery disease.  He comes in with another episode of palpitations associated with shortness of breath and was found to be in atrial fibrillation, started on heparin, Cardizem, and we were consulted to assist in the management of the patient.  Denies any active chest pain.  Does have some baseline shortness of breath and history of sleep apnea.  Does have multiple risk factors for coronary artery disease as outlined above.\"    Subjective (Events in last 24 hours):   Pt awake and alert.  NAD. No cp or sob. No edema or orthopnea  No complaints  Tolerated morning meds  On RA    Objective:   /68   Pulse 66   Temp 97.9 °F (36.6 °C) (Oral)   Resp 16   Ht 1.854 m (6' 1\")   Wt (!) 159.8 kg (352 lb 4.8 oz)   SpO2 96%   BMI 46.48 kg/m²        TELEMETRY: nsr HR 60s    Physical Exam:  General Appearance: alert and oriented to person, place and time, in no acute distress  Cardiovascular: normal rate, regular rhythm, normal S1 and S2, no murmurs, rubs, clicks, or gallops, distal pulses intact, no carotid bruits, no JVD  Pulmonary/Chest: clear to auscultation bilaterally- no wheezes, rales or rhonchi, normal air movement, no respiratory distress  Abdomen: soft, non-tender, non-distended, normal bowel sounds, no masses Extremities: no 
Discussed discharge summary with patient. Instructed patient about medications & follow up appointments. Patient received meds to beds. Patient denies any additional questions. Patient was discharged with all belongings. No distress noted. Patient discharged to home. Taken down to the vehicle by transporter per wheelchair.     
Evaluated cost of Eliquis vs Xarelto for Loida Cuevas RPh    Eliquis: $10.00/month with co-pay assistance card. Patient eligible for free 30-day trial from [facility].    Xarelto: $10.00/month with co-pay assistance card. Patient eligible for free 30-day trial from [facility].    Co-pay card for patient to use at local pharmacy of choice for future fills will be provided to him at time of Meds to Beds delivery.     Thank you!  Dawn Velez [facility] - Prescription Assistance ([phone]) 5/7/2024,10:51 AM    
Warfarin Pharmacy Consult Note    Luis Palmer is a 53 y.o. male for whom pharmacy has been asked to manage warfarin therapy.     Consulting Provider: Dr. Duran  Warfarin Indication: Atrial fibrillation or Atrial flutter  Target INR range: 2.0-3.0   Warfarin dose prior to admission: 0 mg M,W,F,Sat,Sun; 7.5 mg T,Th  Outpatient warfarin provider: [facility]    Recent Labs     05/02/24 2050 05/03/24  0438   INR 1.86* 1.66*     Recent Labs     05/02/24 2050 05/03/24  0438   HGB 16.4 16.9    202     Concurrent anticoagulants/antiplatelets: none  Significant warfarin drug-drug interactions: none    Date INR Warfarin Dose   5/3/2024 1.66 10 mg                                   INR will be monitored routinely until therapeutic INR is achieved.    Pharmacy will continue to follow. Thank you for the consult,   Brian Cline [facility] 5/3/2024 8:39 AM      
Warfarin Pharmacy Consult Note    Warfarin Indication: Atrial fibrillation or Atrial flutter  Target INR: 2.0-3.0  Dose prior to admission: 10 mg MWFSS, 7.5 mg TTh    Recent Labs     05/05/24  0614 05/06/24  0545 05/07/24 0524   INR 2.11* 2.31* 2.61*     Recent Labs     05/05/24  0614 05/06/24  0545   HGB 16.6 16.8    217     Concurrent anticoagulants/antiplatelets: none  Significant warfarin drug-drug interactions: none     Date INR Warfarin Dose   5/3/2024 1.66 10 mg    5/4/24 1.68  12.5 mg     5/5/24 2.11  7.5 mg     5/6/24  2.31 10 mg     5/7/24   2.61  7.5 mg                         Monitoring:                   INR will be monitored daily until therapeutic INR is achieved.    **Please contact pharmacy for discharge instructions when indicated**    Loida Cuevas PharmD, BCPS 5/7/2024 7:44 AM      
Warfarin Pharmacy Consult Note    Warfarin Indication: Atrial fibrillation or Atrial flutter  Target INR: 2.0-3.0  Dose prior to admission: 10mg MWF, 7.5mg all other days    Recent Labs     05/03/24  0438 05/04/24  0325 05/05/24  0614   INR 1.66* 1.68* 2.11*     Recent Labs     05/03/24  0438 05/04/24  0325 05/05/24  0614   HGB 16.9 16.2 16.6    211 203     Concurrent anticoagulants/antiplatelets: Heparin drip  Significant warfarin drug-drug interactions: none     Date INR Warfarin Dose   5/3/2024 1.66 10 mg    5/4/24 1.68  12.5 mg     5/5/24 2.11  7.5 mg                                        Monitoring:                   INR will be monitored daily until therapeutic INR is achieved.    **Please contact pharmacy for discharge instructions when indicated**    Kaila RIBEIRO.Ph., BCPS., 5/5/2024,7:27 AM      
Warfarin Pharmacy Consult Note    Warfarin Indication: Atrial fibrillation or Atrial flutter  Target INR: 2.0-3.0  Dose prior to admission: 10mg MWF, 7.5mg all other days    Recent Labs     05/04/24  0325 05/05/24  0614 05/06/24  0545   INR 1.68* 2.11* 2.31*     Recent Labs     05/04/24  0325 05/05/24  0614   HGB 16.2 16.6    203     Concurrent anticoagulants/antiplatelets: none  Significant warfarin drug-drug interactions: none     Date INR Warfarin Dose   5/3/2024 1.66 10 mg    5/4/24 1.68  12.5 mg     5/5/24 2.11  7.5 mg     5/6/24  2.31 10 mg                                Monitoring:                   INR will be monitored daily until therapeutic INR is achieved.    **Please contact pharmacy for discharge instructions when indicated**    Kaila RIBEIRO.Ph., BCPS., 5/6/2024,7:18 AM      
Other-    ===================================================================      Chief Complaint: Palpitations    Hospital Course:    fabio Palmer is a 53 y.o. male with PMHx of A-fib RVR status post DCCV in 2021, hypertension, hyperlipidemia, anxiety/depression, bilateral pulmonary embolism in 2021 on Coumadin chronically, COPD, GAETANO with CPAP use who presents to [facility] as a direct transfer [facility] 5/2/2024 with chief complaint of A-fib RVR.  Home medications include Lopressor 25 twice daily.  Patient reports that he was driving home from work drinking a icy when he started to develop chest palpitations, denies any shortness of breath, chest pains.  He states that this is not uncommon, however over the course of the night the chest palpitations did not get better prompting him to seek medical attention.  EKG showing patient was in A-fib RVR ventricular rate in the 160s.  Patient reports good compliance with his CPAP machine at night and with his Lopressor.  BMP shows electrolytes within normal limits, no signs of systemic infection WBCs on presentation 9.4.  Patient was a previous patient of Dr. Pineda with cardiology.  Previous smoking history 22-pack-year currently not smoking.     5/4/2024: Attempted to wean off diltiazem drip yesterday however patient went into A-fib RVR and heart rate in 140s.  Will titrate up the Lopressor today.  Plan to wean off Cardizem drip today.    Subjective (past 24 hours): Patient denies any fevers, chills, nausea, vomiting, chest pain, shortness of breath.  States that he feels no palpitations at this time.    ROS: reviewed complete ROS unchanged unless otherwise stated in hospital course/subjective portion.       Medications:  Reviewed    metoprolol tartrate, 100 mg, Oral, BID    warfarin, 12.5 mg, Oral, Once    valsartan, 80 mg, Oral, Daily    busPIRone, 10 mg, Oral, BID    escitalopram, 20 mg, Oral, Daily    mometasone-formoterol, 2 puff, 
  Component Value Date/Time     05/06/2024 05:45 AM    K 4.9 05/06/2024 05:45 AM     05/06/2024 05:45 AM    CO2 27 05/06/2024 05:45 AM    BUN 17 05/06/2024 05:45 AM    CREATININE 1.0 05/06/2024 05:45 AM    LABGLOM 90 05/06/2024 05:45 AM    LABGLOM >60 05/15/2023 06:45 AM    GLUCOSE 104 05/06/2024 05:45 AM    CALCIUM 8.8 05/06/2024 05:45 AM       Hepatic Function Panel:    Lab Results   Component Value Date/Time    ALKPHOS 87 05/02/2024 08:50 PM    ALT 40 05/02/2024 08:50 PM    AST 31 05/02/2024 08:50 PM    BILITOT 0.5 05/02/2024 08:50 PM       Magnesium:    Lab Results   Component Value Date/Time    MG 2.0 05/03/2024 04:38 AM       PT/INR:    Lab Results   Component Value Date/Time    INR 2.31 05/06/2024 05:45 AM       HgBA1c:    Lab Results   Component Value Date/Time    LABA1C 5.3 05/03/2024 02:00 AM       FLP:    Lab Results   Component Value Date/Time    TRIG 156 05/03/2024 02:00 AM    HDL 23 05/03/2024 02:00 AM       TSH:    Lab Results   Component Value Date/Time    TSH 1.820 05/03/2024 02:00 AM         Assessment:    Recurrent afib with rvr   Hx DCCV 4/2021   S/p DOE/DCCV to NSR 5/6/24  Ef 55-60 per TTE 5/3/24  Hx PE - on coumadin  HTN  Dyslipidemia  DM  GAETANO - compliant with cpap  Morbid obesity      Plan:    Keep mag >2 and k >4  Normal TSH 5/3/24  Cont coumadin  Cont cdz/BB/flecainide  F/up dr grande 2-4 weeks  Will follow prn       Electronically signed by Gayathri Reynolds PA-C on 5/6/2024 at 8:17 AM

## 2024-05-07 NOTE — PLAN OF CARE
Problem: Discharge Planning  Goal: Discharge to home or other facility with appropriate resources  5/7/2024 0108 by Mary Antonio RN  Outcome: Progressing  Flowsheets  Taken 5/7/2024 0108  Discharge to home or other facility with appropriate resources:   Identify barriers to discharge with patient and caregiver   Arrange for needed discharge resources and transportation as appropriate  Taken 5/6/2024 2015  Discharge to home or other facility with appropriate resources: Identify barriers to discharge with patient and caregiver  5/6/2024 1316 by Kylee Hay RN  Outcome: Progressing  Note: From home    5/6/2024 1316 by Kylee Hay RN  Outcome: Progressing  Flowsheets (Taken 5/6/2024 1316)  Discharge to home or other facility with appropriate resources: Identify barriers to discharge with patient and caregiver  Note: From home      Problem: Safety - Adult  Goal: Free from fall injury  5/7/2024 0108 by Mary Antonio RN  Outcome: Progressing  Flowsheets (Taken 5/7/2024 0108)  Free From Fall Injury: Instruct family/caregiver on patient safety  5/6/2024 1316 by Kylee Hay RN  Outcome: Progressing  Flowsheets (Taken 5/6/2024 1315)  Free From Fall Injury: Instruct family/caregiver on patient safety  Note: Bed locked & in low position, call light in reach, side-rails up x2, bed/chair alarm utilized, non-slip socks on when ambulating, reminded patient to use call light to call for assistance.   5/6/2024 1316 by Kylee Hay RN  Outcome: Progressing  Flowsheets (Taken 5/6/2024 1315)  Free From Fall Injury: Instruct family/caregiver on patient safety  Note: Bed locked & in low position, call light in reach, side-rails up x2, bed/chair alarm utilized, non-slip socks on when ambulating, reminded patient to use call light to call for assistance.      Problem: ABCDS Injury Assessment  Goal: Absence of physical injury  5/7/2024 0108 by Mary Antonio RN  Outcome: Progressing  Flowsheets (Taken 5/7/2024

## 2024-05-07 NOTE — CARE COORDINATION
5/7/24, 8:43 AM EDT    DISCHARGE ON GOING EVALUATION    [facility] day: 5  Location: [facility]25/025-A Reason for admit: Atrial fibrillation with RVR (HCC) [I48.91]     Procedures:   5/3 Echo: EF 55-60%.   5/6 DOE/CV: Successful DOE guided cardioversion. EF 60-65%. Mild to moderate mitral regurgitation.     Imaging since last note: None    Barriers to Discharge: Hospitalist and Cardiology following. Pt was successfully cardioverted yesterday. Remains in NSR today. Possible discharge today.     PCP: Leopold, Katelyn Ann, MD  Readmission Risk Score: 7    Patient Goals/Plan/Treatment Preferences: Plans home alone.    Anticipate discharge today.    5/7/24, 8:47 AM EDT    Patient goals/plan/ treatment preferences discussed by  and .  Patient goals/plan/ treatment preferences reviewed with patient/ family.  Patient/ family verbalize understanding of discharge plan and are in agreement with goal/plan/treatment preferences.  Understanding was demonstrated using the teach back method.  AVS provided by RN at time of discharge, which includes all necessary medical information pertaining to the patients current course of illness, treatment, post-discharge goals of care, and treatment preferences.     Services At/After Discharge: None

## 2024-05-07 NOTE — PLAN OF CARE
Problem: Discharge Planning  Goal: Discharge to home or other facility with appropriate resources  5/7/2024 0752 by Kylee Hay RN  Outcome: Progressing  Flowsheets (Taken 5/7/2024 0108 by Mary Antonio, RN)  Discharge to home or other facility with appropriate resources:   Identify barriers to discharge with patient and caregiver   Arrange for needed discharge resources and transportation as appropriate  Note: D/c home today likely     Problem: Safety - Adult  Goal: Free from fall injury  5/7/2024 0752 by Kylee Hay RN  Outcome: Progressing  Flowsheets (Taken 5/7/2024 0752)  Free From Fall Injury: Instruct family/caregiver on patient safety  Note: Bed locked & in low position, call light in reach, side-rails up x2, bed/chair alarm utilized, non-slip socks on when ambulating, reminded patient to use call light to call for assistance.      Problem: Chronic Conditions and Co-morbidities  Goal: Patient's chronic conditions and co-morbidity symptoms are monitored and maintained or improved  5/7/2024 0752 by Kylee Hay RN  Outcome: Progressing  Flowsheets (Taken 5/7/2024 0108 by Mary Antonio, RN)  Care Plan - Patient's Chronic Conditions and Co-Morbidity Symptoms are Monitored and Maintained or Improved: Monitor and assess patient's chronic conditions and comorbid symptoms for stability, deterioration, or improvement     Problem: Cardiovascular - Adult  Goal: Maintains optimal cardiac output and hemodynamic stability  5/7/2024 0752 by Kylee Hay RN  Outcome: Progressing  Flowsheets (Taken 5/7/2024 0752)  Maintains optimal cardiac output and hemodynamic stability: Monitor blood pressure and heart rate  Note: Bradycardic at times    Care plan reviewed with patient.  Patient verbalizes understanding of the care plan and contributed to goal setting.

## 2024-05-07 NOTE — DISCHARGE SUMMARY
Center as a direct transfer from [facility] 5/2/2024 with chief complaint of A-fib RVR.  Home medications include Lopressor 25 twice daily.  Patient reports that he was driving home from work drinking a icy when he started to develop chest palpitations, denies any shortness of breath, chest pains.  He states that this is not uncommon, however over the course of the night the chest palpitations did not get better prompting him to seek medical attention.  EKG showing patient was in A-fib RVR ventricular rate in the 160s.  Patient reports good compliance with his CPAP machine at night and with his Lopressor.  BMP shows electrolytes within normal limits, no signs of systemic infection WBCs on presentation 9.4.  Patient was a previous patient of Dr. Pineda with cardiology.  Previous smoking history 22-pack-year currently not smoking. 5/4/2024: Attempted to wean off diltiazem drip yesterday however patient went into A-fib RVR and heart rate in 140s.  Will titrate up the Lopressor today.  Plan to wean off Cardizem drip today.  Due to recurrent episodes of A-fib RVR patient was scheduled for DOE cardioversion.  Patient was successfully cardioverted into normal sinus rhythm on 5/6.  Patient was also started on flecainide 50 mg twice daily at that time.  Patient stable to discharge home with flecainide, diltiazem and Lopressor.  Patient also previously on Coumadin due to history of PE/DVT.  Patient does also require anticoagulation for his A-fib with [id number] of 2.  Echo demonstrated no valvular abnormalities.  When patient presented he did have a subtherapeutic INR which required bridging.  Patient does not follow with Coumadin clinic.  Pharmacy was consulted to see how much it would cost for patient to get transition to Eliquis or Xarelto.  Patient can afford it.  However on day of discharge patient's INR was 2.61.  Extensive conversation was had with patient with instructions on how to transition to Eliquis.

## 2024-05-09 ENCOUNTER — HOSPITAL ENCOUNTER (OUTPATIENT)
Age: 54
Discharge: HOME | End: 2024-05-09
Payer: COMMERCIAL

## 2024-05-09 ENCOUNTER — TELEPHONE (OUTPATIENT)
Dept: FAMILY MEDICINE CLINIC | Age: 54
End: 2024-05-09

## 2024-05-09 DIAGNOSIS — E11.9 TYPE 2 DIABETES MELLITUS WITHOUT COMPLICATION, WITHOUT LONG-TERM CURRENT USE OF INSULIN (HCC): ICD-10-CM

## 2024-05-09 DIAGNOSIS — E87.5 HYPERKALEMIA: ICD-10-CM

## 2024-05-09 DIAGNOSIS — I48.0 PAROXYSMAL ATRIAL FIBRILLATION (HCC): ICD-10-CM

## 2024-05-09 DIAGNOSIS — Z86.711 PERSONAL HISTORY OF PULMONARY EMBOLISM: ICD-10-CM

## 2024-05-09 DIAGNOSIS — E66.01 CLASS 3 SEVERE OBESITY DUE TO EXCESS CALORIES WITH SERIOUS COMORBIDITY AND BODY MASS INDEX (BMI) OF 45.0 TO 49.9 IN ADULT (HCC): ICD-10-CM

## 2024-05-09 DIAGNOSIS — I10 PRIMARY HYPERTENSION: ICD-10-CM

## 2024-05-09 DIAGNOSIS — Z11.59 ENCOUNTER FOR HEPATITIS C SCREENING TEST FOR LOW RISK PATIENT: ICD-10-CM

## 2024-05-09 LAB
ALBUMIN SERPL BCG-MCNC: 3.9 G/DL (ref 3.5–5.1)
ALP SERPL-CCNC: 93 U/L (ref 38–126)
ALT SERPL W/O P-5'-P-CCNC: 35 U/L (ref 11–66)
ANION GAP SERPL CALC-SCNC: 10 MEQ/L (ref 8–16)
AST SERPL-CCNC: 22 U/L (ref 5–40)
BASOPHILS ABSOLUTE: 0 THOU/MM3 (ref 0–0.1)
BASOPHILS NFR BLD AUTO: 0.5 %
BILIRUB SERPL-MCNC: 0.7 MG/DL (ref 0.3–1.2)
BUN SERPL-MCNC: 16 MG/DL (ref 7–22)
CALCIUM SERPL-MCNC: 9.3 MG/DL (ref 8.5–10.5)
CHLORIDE SERPL-SCNC: 99 MEQ/L (ref 98–111)
CHOLEST SERPL-MCNC: 97 MG/DL (ref 100–199)
CO2 SERPL-SCNC: 29 MEQ/L (ref 23–33)
CREAT SERPL-MCNC: 1 MG/DL (ref 0.4–1.2)
CREAT UR-MCNC: 218.2 MG/DL
DEPRECATED MEAN GLUCOSE BLD GHB EST-ACNC: 99 MG/DL (ref 70–126)
DEPRECATED RDW RBC AUTO: 53.1 FL (ref 35–45)
EOSINOPHIL NFR BLD AUTO: 1.5 %
EOSINOPHILS ABSOLUTE: 0.1 THOU/MM3 (ref 0–0.4)
ERYTHROCYTE [DISTWIDTH] IN BLOOD BY AUTOMATED COUNT: 15.9 % (ref 11.5–14.5)
GFR SERPL CREATININE-BSD FRML MDRD: 90 ML/MIN/1.73M2
GLUCOSE SERPL-MCNC: 104 MG/DL (ref 70–108)
HBA1C MFR BLD HPLC: 5.3 % (ref 4.4–6.4)
HCT VFR BLD AUTO: 51.6 % (ref 42–52)
HCV IGG SERPL QL IA: NEGATIVE
HDLC SERPL-MCNC: 28 MG/DL
HGB BLD-MCNC: 16 GM/DL (ref 14–18)
IMM GRANULOCYTES # BLD AUTO: 0.02 THOU/MM3 (ref 0–0.07)
IMM GRANULOCYTES NFR BLD AUTO: 0.2 %
INR BLD: 1.61 (ref 0.85–1.13)
LDLC SERPL CALC-MCNC: 55 MG/DL
LYMPHOCYTES ABSOLUTE: 1 THOU/MM3 (ref 1–4.8)
LYMPHOCYTES NFR BLD AUTO: 11.4 %
MCH RBC QN AUTO: 28.3 PG (ref 26–33)
MCHC RBC AUTO-ENTMCNC: 31 GM/DL (ref 32.2–35.5)
MCV RBC AUTO: 91.3 FL (ref 80–94)
MICROALBUMIN UR-MCNC: 3.29 MG/DL
MICROALBUMIN/CREAT RATIO PNL UR: 15 MG/G (ref 0–30)
MONOCYTES ABSOLUTE: 0.7 THOU/MM3 (ref 0.4–1.3)
MONOCYTES NFR BLD AUTO: 8.3 %
NEUTROPHILS ABSOLUTE: 6.8 THOU/MM3 (ref 1.8–7.7)
NEUTROPHILS NFR BLD AUTO: 78.1 %
NRBC BLD AUTO-RTO: 0 /100 WBC
PLATELET # BLD AUTO: 212 THOU/MM3 (ref 130–400)
PMV BLD AUTO: 10.1 FL (ref 9.4–12.4)
POTASSIUM SERPL-SCNC: 4.9 MEQ/L (ref 3.5–5.2)
PROT SERPL-MCNC: 7.5 G/DL (ref 6.1–8)
RBC # BLD AUTO: 5.65 MILL/MM3 (ref 4.7–6.1)
SODIUM SERPL-SCNC: 138 MEQ/L (ref 135–145)
TRIGL SERPL-MCNC: 72 MG/DL (ref 0–199)
TSH SERPL DL<=0.005 MIU/L-ACNC: 1.8 UIU/ML (ref 0.4–4.2)
WBC # BLD AUTO: 8.7 THOU/MM3 (ref 4.8–10.8)

## 2024-05-09 PROCEDURE — 83036 HEMOGLOBIN GLYCOSYLATED A1C: CPT

## 2024-05-09 PROCEDURE — 84443 ASSAY THYROID STIM HORMONE: CPT

## 2024-05-09 PROCEDURE — 85610 PROTHROMBIN TIME: CPT

## 2024-05-09 PROCEDURE — 82043 UR ALBUMIN QUANTITATIVE: CPT

## 2024-05-09 PROCEDURE — 36415 COLL VENOUS BLD VENIPUNCTURE: CPT

## 2024-05-09 PROCEDURE — 85025 COMPLETE CBC W/AUTO DIFF WBC: CPT

## 2024-05-09 PROCEDURE — 86803 HEPATITIS C AB TEST: CPT

## 2024-05-09 PROCEDURE — 80061 LIPID PANEL: CPT

## 2024-05-09 PROCEDURE — 80053 COMPREHEN METABOLIC PANEL: CPT

## 2024-05-09 NOTE — TELEPHONE ENCOUNTER
Care Transitions Initial Follow Up Call    Outreach made within 2 business days of discharge: Yes    Patient: Luis Palmer Patient : 1970   MRN: [id number]  Reason for Admission: There are no discharge diagnoses documented for the most recent discharge.  Discharge Date: 24       Spoke with: Left message    Discharge department/facility: [facility]    T  Scheduled appointment with PCP within 7-14 days    Follow Up  Future Appointments   Date Time Provider Department Center   2024  9:00 AM Leopold, [facility]n Ann, [facility] [place]   2024  9:15 AM Leopold, K[facility]n Ann[facility] [place]   6/3/2024  2:00 PM Ibrahima No, LINDEN N [facility] - [place]       Alexandra Nur CMA ([facility])

## 2024-05-13 NOTE — TELEPHONE ENCOUNTER
Care Transitions Initial Follow Up Call    Outreach made within 2 business days of discharge: Yes    Patient: Luis Palmer Patient : 1970   MRN: [id number]  Reason for Admission: Atrial fibrillation with RVR (HCC)  Discharge Date: 24       Spoke with: Patient    Discharge department/facility: [facility]    TCM Interactive Patient Contact:  Was patient able to fill all prescriptions: Yes  Was patient instructed to bring all medications to the follow-up visit: Yes  Is patient taking all medications as directed in the discharge summary? Yes  Does patient understand their discharge instructions: Yes  Does patient have questions or concerns that need addressed prior to 7-14 day follow up office visit: no      Follow Up  Future Appointments   Date Time Provider Department Center   2024  9:00 AM Leopold, Katelyn Ann, MD [facility] [place]   2024  9:15 AM [facility] [place]   6/3/2024  2:00 PM Ibrahima No, LINDEN N [facility] [place]       Dawn Britt MA

## 2024-05-14 ENCOUNTER — OFFICE VISIT (OUTPATIENT)
Dept: FAMILY MEDICINE CLINIC | Age: 54
End: 2024-05-14

## 2024-05-14 VITALS
HEART RATE: 58 BPM | WEIGHT: 315 LBS | HEIGHT: 73 IN | OXYGEN SATURATION: 97 % | SYSTOLIC BLOOD PRESSURE: 128 MMHG | BODY MASS INDEX: 41.75 KG/M2 | DIASTOLIC BLOOD PRESSURE: 72 MMHG

## 2024-05-14 DIAGNOSIS — I48.0 PAROXYSMAL ATRIAL FIBRILLATION (HCC): ICD-10-CM

## 2024-05-14 DIAGNOSIS — E66.01 CLASS 3 SEVERE OBESITY DUE TO EXCESS CALORIES WITH SERIOUS COMORBIDITY AND BODY MASS INDEX (BMI) OF 45.0 TO 49.9 IN ADULT (HCC): ICD-10-CM

## 2024-05-14 DIAGNOSIS — I48.91 ATRIAL FIBRILLATION WITH RVR (HCC): Primary | ICD-10-CM

## 2024-05-14 ASSESSMENT — ENCOUNTER SYMPTOMS
SORE THROAT: 0
DIARRHEA: 0
ABDOMINAL PAIN: 0
SHORTNESS OF BREATH: 0
CONSTIPATION: 0
COUGH: 0
VOMITING: 0

## 2024-05-14 NOTE — PROGRESS NOTES
[facility] [place] [facility]  [address]  [place] [address]  Dept: [phone]     SUBJECTIVE     Luis Palmer is a 54 y.o.male    Pt presents for follow up of hospital.  Pt was admitted at [facility] from 5/2/24 through 5/7/24 for paroxysmal atrial fibrillation with RVR.  He had DOE cardioversion during admission (prior in 2021 was successful).  ECHo with normal LV EF 55-60%  Discharge medications including diltiazem 120 mg daily, lopressor 50 BID, flecainide 50 BID.  Changed from warfarin to eliquis.    He was initially started on warfarin during his hospitalization in 2021 at which time he was found to have acute PE and DVT and was in atrial fibrillation with RVR.   Feeling good since d/c to home and is returning to work this week  He is watching his BP and HR and they are running in good range  Blood sugars are running ok- last A1c 6.4%  Moods are good with lexapro and buspirone  No changes in sleep, appetite.  He is cutting back on his dietary portions given recent weight gain through the winter.    Breathing is good- no cough or wheezing  No CP or BALLESTEROS.      Patient Active Problem List   Diagnosis    PSVT (paroxysmal supraventricular tachycardia) ([facility])    Hypertension    GAETANO (obstructive sleep apnea)    Type 2 diabetes mellitus without complication, without long-term current use of insulin ([facility])    History of tobacco use disorder    Macrocytosis without anemia    Personal history of pulmonary embolism    Anticoagulated on Coumadin    Paroxysmal atrial fibrillation ([facility])    Class 3 severe obesity due to excess calories with serious comorbidity and body mass index (BMI) of 45.0 to 49.9 in adult ([facility])    Generalized anxiety disorder    Atrial fibrillation with RVR ([facility])       Current Outpatient Medications   Medication Sig Dispense Refill    flecainide (TAMBOCOR) 50 MG tablet Take 1 tablet by mouth 2 times daily 60 tablet 3    metoprolol tartrate (LOPRESSOR) 50 MG tablet Take 1

## 2024-05-20 RX ORDER — ALBUTEROL SULFATE 90 UG/1
AEROSOL, METERED RESPIRATORY (INHALATION)
Qty: 9 G | Refills: 3 | Status: SHIPPED | OUTPATIENT
Start: 2024-05-20

## 2024-06-03 ENCOUNTER — OFFICE VISIT (OUTPATIENT)
Dept: CARDIOLOGY CLINIC | Age: 54
End: 2024-06-03
Payer: COMMERCIAL

## 2024-06-03 VITALS
BODY MASS INDEX: 41.75 KG/M2 | SYSTOLIC BLOOD PRESSURE: 154 MMHG | HEIGHT: 73 IN | HEART RATE: 60 BPM | WEIGHT: 315 LBS | DIASTOLIC BLOOD PRESSURE: 74 MMHG

## 2024-06-03 DIAGNOSIS — E78.5 HYPERLIPIDEMIA, UNSPECIFIED HYPERLIPIDEMIA TYPE: ICD-10-CM

## 2024-06-03 DIAGNOSIS — I48.0 PAROXYSMAL ATRIAL FIBRILLATION (HCC): Primary | ICD-10-CM

## 2024-06-03 DIAGNOSIS — I10 PRIMARY HYPERTENSION: ICD-10-CM

## 2024-06-03 PROCEDURE — 3077F SYST BP >= 140 MM HG: CPT | Performed by: PHYSICIAN ASSISTANT

## 2024-06-03 PROCEDURE — 99214 OFFICE O/P EST MOD 30 MIN: CPT | Performed by: PHYSICIAN ASSISTANT

## 2024-06-03 PROCEDURE — 3078F DIAST BP <80 MM HG: CPT | Performed by: PHYSICIAN ASSISTANT

## 2024-06-03 RX ORDER — METOPROLOL TARTRATE 50 MG/1
50 TABLET, FILM COATED ORAL 2 TIMES DAILY
Qty: 180 TABLET | Refills: 3 | Status: SHIPPED | OUTPATIENT
Start: 2024-06-03

## 2024-06-03 RX ORDER — SIMVASTATIN 20 MG
20 TABLET ORAL DAILY
Qty: 90 TABLET | Refills: 3 | Status: SHIPPED | OUTPATIENT
Start: 2024-06-03

## 2024-06-03 RX ORDER — DILTIAZEM HYDROCHLORIDE 120 MG/1
120 CAPSULE, COATED, EXTENDED RELEASE ORAL DAILY
Qty: 90 CAPSULE | Refills: 3 | Status: SHIPPED | OUTPATIENT
Start: 2024-06-03

## 2024-06-03 RX ORDER — BUSPIRONE HYDROCHLORIDE 10 MG/1
10 TABLET ORAL 2 TIMES DAILY
Qty: 180 TABLET | Refills: 3 | Status: SHIPPED | OUTPATIENT
Start: 2024-06-03

## 2024-06-03 RX ORDER — FLECAINIDE ACETATE 50 MG/1
50 TABLET ORAL 2 TIMES DAILY
Qty: 180 TABLET | Refills: 3 | Status: SHIPPED | OUTPATIENT
Start: 2024-06-03

## 2024-06-03 NOTE — TELEPHONE ENCOUNTER
Pt would like following medications as a 90 day supply to [facility] pharmacy. Please advise.    5/14/2024 8/9/2024

## 2024-06-03 NOTE — PROGRESS NOTES
[facility] [place] [facility]  [facility] CARDIOLOGY  [address]  [address]  [facility] [address]  Dept: [phone]  Dept Fax: [phone]  Loc: [phone]    Chief Complaint   Patient presents with    Follow-Up from Hospital     Afib RVR. No cardiac complaints      Presents for follow-up appointment after recent hospitalization for A-fib RVR and subsequent cardioversion.  Patient states he has not had any further episodes of palpitations.  He is not having any issues with his current medications.  He denies any chest pain or shortness of breath.  Cardiologist:  Dr. Pineda        General:   No fever, no chills, No fatigue or weight loss  Pulmonary:    No dyspnea, no wheezing  Cardiac:    Denies recent chest pain   GI:     No nausea or vomiting, no abdominal pain  Neuro:    No dizziness or light headedness  Musculoskeletal:  No recent active issues  Extremities:   No edema, good peripheral pulses      Past Medical History:   Diagnosis Date    Atrial fibrillation (HCC)     Generalized anxiety disorder 08/30/2022    Hypertension     Personal history of pulmonary embolism 04/18/2021    Pulmonary emboli (HCC) 02/2021       No Known Allergies    Current Outpatient Medications   Medication Sig Dispense Refill    apixaban (ELIQUIS) 5 MG TABS tablet Take 1 tablet by mouth 2 times daily Start taking after INR is below 2.0 180 tablet 3    dilTIAZem (CARDIZEM CD) 120 MG extended release capsule Take 1 capsule by mouth daily 90 capsule 3    flecainide (TAMBOCOR) 50 MG tablet Take 1 tablet by mouth 2 times daily 180 tablet 3    metoprolol tartrate (LOPRESSOR) 50 MG tablet Take 1 tablet by mouth 2 times daily 180 tablet 3    albuterol sulfate HFA (PROVENTIL;VENTOLIN;PROAIR) 108 (90 Base) MCG/ACT inhaler INHALE 2 PUFFS BY MOUTH EVERY 6 HOURS AS NEEDED FOR WHEEZING FOR SHORTNESS OF BREATH 9 g 3    metFORMIN (GLUCOPHAGE) 500 MG tablet TAKE 1 TABLET BY MOUTH TWICE DAILY WITH MEALS 60 tablet 0     psychiatric evaluation

## 2024-06-03 NOTE — TELEPHONE ENCOUNTER
Attempted to contact patient, no answer. Left a message on his VM informing him his refills were sent to the pharmacy

## 2024-08-07 RX ORDER — FLUTICASONE PROPIONATE AND SALMETEROL 250; 50 UG/1; UG/1
1 POWDER RESPIRATORY (INHALATION) 2 TIMES DAILY
Qty: 60 EACH | Refills: 0 | Status: SHIPPED | OUTPATIENT
Start: 2024-08-07

## 2024-08-27 ENCOUNTER — OFFICE VISIT (OUTPATIENT)
Dept: FAMILY MEDICINE CLINIC | Age: 54
End: 2024-08-27
Payer: COMMERCIAL

## 2024-08-27 VITALS
WEIGHT: 315 LBS | SYSTOLIC BLOOD PRESSURE: 128 MMHG | BODY MASS INDEX: 41.75 KG/M2 | DIASTOLIC BLOOD PRESSURE: 66 MMHG | OXYGEN SATURATION: 96 % | HEIGHT: 73 IN | HEART RATE: 82 BPM

## 2024-08-27 DIAGNOSIS — I10 PRIMARY HYPERTENSION: ICD-10-CM

## 2024-08-27 DIAGNOSIS — I48.91 ATRIAL FIBRILLATION WITH RVR (HCC): ICD-10-CM

## 2024-08-27 DIAGNOSIS — E11.9 TYPE 2 DIABETES MELLITUS WITHOUT COMPLICATION, WITHOUT LONG-TERM CURRENT USE OF INSULIN (HCC): Primary | ICD-10-CM

## 2024-08-27 LAB — HBA1C MFR BLD: 6.2 %

## 2024-08-27 PROCEDURE — 3074F SYST BP LT 130 MM HG: CPT | Performed by: FAMILY MEDICINE

## 2024-08-27 PROCEDURE — 83036 HEMOGLOBIN GLYCOSYLATED A1C: CPT | Performed by: FAMILY MEDICINE

## 2024-08-27 PROCEDURE — 99214 OFFICE O/P EST MOD 30 MIN: CPT | Performed by: FAMILY MEDICINE

## 2024-08-27 PROCEDURE — 3044F HG A1C LEVEL LT 7.0%: CPT | Performed by: FAMILY MEDICINE

## 2024-08-27 PROCEDURE — 3078F DIAST BP <80 MM HG: CPT | Performed by: FAMILY MEDICINE

## 2024-08-27 ASSESSMENT — ENCOUNTER SYMPTOMS
SHORTNESS OF BREATH: 0
SORE THROAT: 0
COUGH: 0
TROUBLE SWALLOWING: 0
CONSTIPATION: 0
ABDOMINAL PAIN: 0
VOMITING: 0
DIARRHEA: 0

## 2024-08-27 NOTE — PROGRESS NOTES
bryan.      Results for POC orders placed in visit on 08/27/24   POCT glycosylated hemoglobin (Hb A1C)   Result Value Ref Range    Hemoglobin A1C 6.2 %       Lab Results   Component Value Date    LABA1C 6.2 08/27/2024       Lab Results   Component Value Date    CHOL 97 (L) 05/09/2024    TRIG 72 05/09/2024    HDL 28 05/09/2024       The ASCVD Risk score (Chacorta POLLOCK, et al., 2019) failed to calculate for the following reasons:    The valid total cholesterol range is 130 to 320 mg/dL    Lab Results   Component Value Date     05/09/2024    K 4.9 05/09/2024    CL 99 05/09/2024    CO2 29 05/09/2024    BUN 16 05/09/2024    CREATININE 1.0 05/09/2024    GLUCOSE 104 05/09/2024    CALCIUM 9.3 05/09/2024    BILITOT 0.7 05/09/2024    ALKPHOS 93 05/09/2024    AST 22 05/09/2024    ALT 35 05/09/2024    LABGLOM 90 05/09/2024     estimated creatinine clearance is 136 mL/min (based on SCr of 1 mg/dL).     No results found for: \"LTJM31QCQ\"    Lab Results   Component Value Date    TSH 1.800 05/09/2024    T4FREE 1.16 05/03/2024       Lab Results   Component Value Date    WBC 8.7 05/09/2024    HGB 16.0 05/09/2024    HCT 51.6 05/09/2024    MCV 91.3 05/09/2024     05/09/2024       No results found for: \"PSA\"    Immunization History   Administered Date(s) Administered   • COVID-19, MODERNA BLUE border, Primary or Immunocompromised, (age 12y+), IM, 100 mcg/0.5mL 08/27/2021, 09/24/2021, 02/21/2022   • COVID-19, MODERNA Bivalent, (age 12y+), IM, 50 mcg/0.5 mL 10/22/2022   • COVID-19, PFIZER Bivalent, DO NOT Dilute, (age 12y+), IM, 30 mcg/0.3 mL 10/22/2022   • Hep B, ENGERIX-B, (age 20y+), IM, 1mL 03/31/2023   • Influenza Virus Vaccine 10/22/2022   • Influenza, FLUARIX, FLULAVAL, FLUZONE (age 6 mo+) and AFLURIA, (age 3 y+), Quadv PF, 0.5mL 09/24/2021   • Influenza, FLUBLOK, (age 18 y+), Quadv PF, 0.5mL 10/22/2022   • Pneumococcal, PCV20, PREVNAR 20, (age 6w+), IM, 0.5mL 04/08/2022   • TDaP, ADACEL (age 10y-64y), BOOSTRIX (age 10y+),  hypertension  At goal, no medication changes.            Katelyn Ann Leopold, MD  9:09 AM  8/27/24

## 2024-09-04 RX ORDER — FLUTICASONE PROPIONATE AND SALMETEROL 250; 50 UG/1; UG/1
1 POWDER RESPIRATORY (INHALATION) 2 TIMES DAILY
Qty: 60 EACH | Refills: 5 | Status: SHIPPED | OUTPATIENT
Start: 2024-09-04

## 2024-09-30 ENCOUNTER — TELEPHONE (OUTPATIENT)
Dept: CARDIOLOGY CLINIC | Age: 54
End: 2024-09-30

## 2024-09-30 NOTE — TELEPHONE ENCOUNTER
Looks like I refilled his Cardizem 120 mg daily on Carey 3, 2024.  This is the dose that he should continue unless it was changed by his PCP.

## 2024-12-09 ENCOUNTER — OFFICE VISIT (OUTPATIENT)
Dept: CARDIOLOGY CLINIC | Age: 54
End: 2024-12-09
Payer: COMMERCIAL

## 2024-12-09 VITALS
SYSTOLIC BLOOD PRESSURE: 126 MMHG | HEIGHT: 73 IN | WEIGHT: 315 LBS | HEART RATE: 64 BPM | BODY MASS INDEX: 41.75 KG/M2 | DIASTOLIC BLOOD PRESSURE: 68 MMHG

## 2024-12-09 DIAGNOSIS — I48.0 PAROXYSMAL ATRIAL FIBRILLATION (HCC): Primary | ICD-10-CM

## 2024-12-09 DIAGNOSIS — I10 PRIMARY HYPERTENSION: ICD-10-CM

## 2024-12-09 PROCEDURE — 99214 OFFICE O/P EST MOD 30 MIN: CPT | Performed by: INTERNAL MEDICINE

## 2024-12-09 PROCEDURE — 3078F DIAST BP <80 MM HG: CPT | Performed by: INTERNAL MEDICINE

## 2024-12-09 PROCEDURE — 3074F SYST BP LT 130 MM HG: CPT | Performed by: INTERNAL MEDICINE

## 2024-12-09 NOTE — PATIENT INSTRUCTIONS
Your nurses today were KORIN Lowe and YARIEL Portillo  Your provider today was Dr. Pineda  Phone number: [phone]      You may receive a survey regarding the care you received during your visit.  Your input is valuable to us.  We encourage you to complete and return your survey.  We hope you will choose us in the future for your healthcare needs.

## 2024-12-09 NOTE — PROGRESS NOTES
6 month follow-up.   No concerns.       
oz)   BMI 44.17 kg/m²     Wt Readings from Last 3 Encounters:   12/09/24 (!) 151.9 kg (334 lb 12.8 oz)   08/27/24 (!) 161.1 kg (355 lb 3.2 oz)   06/03/24 (!) 158.8 kg (350 lb)     BP Readings from Last 3 Encounters:   12/09/24 126/68   08/27/24 128/66   06/03/24 (!) 154/74       Nursing note and vitals reviewed.    Physical Exam   Constitutional: Oriented to person, place, and time. Appears well-developed and well-nourished.   HENT:   Head: Normocephalic and atraumatic.   Eyes: EOM are normal. Pupils are equal, round, and reactive to light.   Neck: Normal range of motion. Neck supple. No JVD present.   Cardiovascular: Normal rate, regular rhythm, normal heart sounds and intact distal pulses.    No murmur heard.  Pulmonary/Chest: Effort normal and breath sounds normal. No respiratory distress. No wheezes. No rales.   Abdominal: Soft. Bowel sounds are normal. No distension. There is no tenderness.   Musculoskeletal: Normal range of motion. No edema.   Neurological: Alert and oriented to person, place, and time. No cranial nerve deficit. Coordination normal.   Skin: Skin is warm and dry.   Psychiatric: Normal mood and affect.     Physical Exam           No results found for: \"CKTOTAL\", \"CKMB\", \"CKMBINDEX\"    Lab Results   Component Value Date/Time    WBC 8.7 05/09/2024 09:43 AM    RBC 5.65 05/09/2024 09:43 AM    HGB 16.0 05/09/2024 09:43 AM    HCT 51.6 05/09/2024 09:43 AM    MCV 91.3 05/09/2024 09:43 AM    MCH 28.3 05/09/2024 09:43 AM    MCHC 31.0 05/09/2024 09:43 AM    RDW 16.2 05/02/2024 08:50 PM     05/09/2024 09:43 AM    MPV 10.1 05/09/2024 09:43 AM       Lab Results   Component Value Date/Time     05/09/2024 09:42 AM    K 4.9 05/09/2024 09:42 AM    CL 99 05/09/2024 09:42 AM    CO2 29 05/09/2024 09:42 AM    BUN 16 05/09/2024 09:42 AM    CREATININE 1.0 05/09/2024 09:42 AM    CALCIUM 9.3 05/09/2024 09:42 AM    LABGLOM 90 05/09/2024 09:42 AM    LABGLOM >60 05/15/2023 06:45 AM    GLUCOSE 104 05/09/2024

## 2025-01-13 RX ORDER — ALBUTEROL SULFATE 90 UG/1
2 INHALANT RESPIRATORY (INHALATION) EVERY 4 HOURS PRN
Qty: 9 G | Refills: 3 | Status: SHIPPED | OUTPATIENT
Start: 2025-01-13

## 2025-01-13 RX ORDER — ALBUTEROL SULFATE 90 UG/1
INHALANT RESPIRATORY (INHALATION)
Qty: 9 G | Refills: 0 | OUTPATIENT
Start: 2025-01-13

## 2025-01-13 NOTE — TELEPHONE ENCOUNTER
This medication refill is regarding a electronic request.  Refill requested by patient.    Requested Prescriptions     Pending Prescriptions Disp Refills    albuterol sulfate HFA (PROVENTIL;VENTOLIN;PROAIR) 108 (90 Base) MCG/ACT inhaler 9 g 3       Date of last visit: 8/27/2024  Date of next visit: Visit date not found  Date of last refill: 05/20/2024  Pharmacy Name: [facility] [place]

## 2025-02-06 RX ORDER — ESCITALOPRAM OXALATE 20 MG/1
20 TABLET ORAL DAILY
Qty: 90 TABLET | Refills: 3 | Status: SHIPPED | OUTPATIENT
Start: 2025-02-06

## 2025-02-06 NOTE — TELEPHONE ENCOUNTER
Luis Palmer called requesting a refill on the following medications:  Requested Prescriptions     Pending Prescriptions Disp Refills    escitalopram (LEXAPRO) 20 MG tablet [Pharmacy Med Name: Escitalopram Oxalate 20 MG Oral Tablet] 90 tablet 0     Sig: Take 1 tablet by mouth once daily       Date of last visit: 8/18/2023  Date of next visit (if applicable):Visit date not found  Date of last refill: 02/14/2024  Pharmacy Name: [facility]

## 2025-02-10 RX ORDER — VALSARTAN 80 MG/1
80 TABLET ORAL DAILY
Qty: 90 TABLET | Refills: 0 | Status: SHIPPED | OUTPATIENT
Start: 2025-02-10

## 2025-02-10 NOTE — TELEPHONE ENCOUNTER
Last appointment this department: 8/18/2024  Next appointment this department: Visit date not found

## 2025-03-10 RX ORDER — FLUTICASONE PROPIONATE AND SALMETEROL 250; 50 UG/1; UG/1
1 POWDER RESPIRATORY (INHALATION) 2 TIMES DAILY
Qty: 60 EACH | Refills: 0 | Status: SHIPPED | OUTPATIENT
Start: 2025-03-10

## 2025-03-10 NOTE — TELEPHONE ENCOUNTER
This medication refill is regarding a electronic request.  Refill requested by patient.    Requested Prescriptions     Pending Prescriptions Disp Refills    fluticasone-salmeterol (ADVAIR) 250-50 MCG/ACT AEPB diskus inhaler [Pharmacy Med Name: Fluticasone-Salmeterol 250-50 MCG/DOSE Inhalation Aerosol Powder Breath Activated] 60 each 0     Sig: INHALE 1 PUFF BY MOUTH TWICE DAILY       Date of last visit: 8/27/2024  Date of next visit: Visit date not found  Date of last refill: 09/04/2024  Pharmacy Name: [facility] [place]

## 2025-03-25 ENCOUNTER — OFFICE VISIT (OUTPATIENT)
Dept: FAMILY MEDICINE CLINIC | Age: 55
End: 2025-03-25
Payer: COMMERCIAL

## 2025-03-25 VITALS
BODY MASS INDEX: 41.75 KG/M2 | SYSTOLIC BLOOD PRESSURE: 128 MMHG | WEIGHT: 315 LBS | OXYGEN SATURATION: 95 % | HEART RATE: 78 BPM | DIASTOLIC BLOOD PRESSURE: 72 MMHG | HEIGHT: 73 IN

## 2025-03-25 DIAGNOSIS — I48.0 PAROXYSMAL ATRIAL FIBRILLATION (HCC): ICD-10-CM

## 2025-03-25 DIAGNOSIS — Z12.5 SCREENING FOR PROSTATE CANCER: ICD-10-CM

## 2025-03-25 DIAGNOSIS — E11.9 TYPE 2 DIABETES MELLITUS WITHOUT COMPLICATION, WITHOUT LONG-TERM CURRENT USE OF INSULIN: Primary | ICD-10-CM

## 2025-03-25 DIAGNOSIS — Z87.891 PERSONAL HISTORY OF TOBACCO USE: ICD-10-CM

## 2025-03-25 DIAGNOSIS — I10 PRIMARY HYPERTENSION: ICD-10-CM

## 2025-03-25 LAB — HBA1C MFR BLD: 5.9 %

## 2025-03-25 PROCEDURE — 3044F HG A1C LEVEL LT 7.0%: CPT | Performed by: FAMILY MEDICINE

## 2025-03-25 PROCEDURE — G0296 VISIT TO DETERM LDCT ELIG: HCPCS | Performed by: FAMILY MEDICINE

## 2025-03-25 PROCEDURE — 83036 HEMOGLOBIN GLYCOSYLATED A1C: CPT | Performed by: FAMILY MEDICINE

## 2025-03-25 PROCEDURE — 3078F DIAST BP <80 MM HG: CPT | Performed by: FAMILY MEDICINE

## 2025-03-25 PROCEDURE — 99214 OFFICE O/P EST MOD 30 MIN: CPT | Performed by: FAMILY MEDICINE

## 2025-03-25 PROCEDURE — 3074F SYST BP LT 130 MM HG: CPT | Performed by: FAMILY MEDICINE

## 2025-03-25 RX ORDER — ALBUTEROL SULFATE 90 UG/1
2 INHALANT RESPIRATORY (INHALATION) EVERY 4 HOURS PRN
Qty: 9 G | Refills: 3 | Status: SHIPPED | OUTPATIENT
Start: 2025-03-25

## 2025-03-25 SDOH — ECONOMIC STABILITY: FOOD INSECURITY: WITHIN THE PAST 12 MONTHS, THE FOOD YOU BOUGHT JUST DIDN'T LAST AND YOU DIDN'T HAVE MONEY TO GET MORE.: NEVER TRUE

## 2025-03-25 SDOH — ECONOMIC STABILITY: FOOD INSECURITY: WITHIN THE PAST 12 MONTHS, YOU WORRIED THAT YOUR FOOD WOULD RUN OUT BEFORE YOU GOT MONEY TO BUY MORE.: NEVER TRUE

## 2025-03-25 ASSESSMENT — ENCOUNTER SYMPTOMS
TROUBLE SWALLOWING: 0
ABDOMINAL PAIN: 0
SORE THROAT: 0
CONSTIPATION: 0
SHORTNESS OF BREATH: 0
VOMITING: 0
COUGH: 0
DIARRHEA: 0

## 2025-03-25 ASSESSMENT — PATIENT HEALTH QUESTIONNAIRE - PHQ9
SUM OF ALL RESPONSES TO PHQ QUESTIONS 1-9: 0
1. LITTLE INTEREST OR PLEASURE IN DOING THINGS: NOT AT ALL
SUM OF ALL RESPONSES TO PHQ QUESTIONS 1-9: 0
2. FEELING DOWN, DEPRESSED OR HOPELESS: NOT AT ALL

## 2025-03-25 NOTE — PROGRESS NOTES
[facility] [place] [facility]  [address]  [place] [address]  Dept: [phone]     SUBJECTIVE     Luis Palmer is a 54 y.o.male    History of Present Illness      Patient Active Problem List   Diagnosis    PSVT (paroxysmal supraventricular tachycardia)    Hypertension    GAETANO (obstructive sleep apnea)    Type 2 diabetes mellitus without complication, without long-term current use of insulin (HCC)    History of tobacco use disorder    Macrocytosis without anemia    Personal history of pulmonary embolism    Anticoagulated on Coumadin    Paroxysmal atrial fibrillation (HCC)    Class 3 severe obesity due to excess calories with serious comorbidity and body mass index (BMI) of 45.0 to 49.9 in adult    Generalized anxiety disorder    Atrial fibrillation with RVR (HCC)       Current Outpatient Medications   Medication Sig Dispense Refill    albuterol sulfate HFA (PROVENTIL;VENTOLIN;PROAIR) 108 (90 Base) MCG/ACT inhaler Inhale 2 puffs into the lungs every 4 hours as needed for Wheezing 9 g 3    fluticasone-salmeterol (ADVAIR) 250-50 MCG/ACT AEPB diskus inhaler INHALE 1 PUFF BY MOUTH TWICE DAILY 60 each 0    valsartan (DIOVAN) 80 MG tablet Take 1 tablet by mouth once daily 90 tablet 0    escitalopram (LEXAPRO) 20 MG tablet Take 1 tablet by mouth once daily 90 tablet 3    apixaban (ELIQUIS) 5 MG TABS tablet Take 1 tablet by mouth 2 times daily Start taking after INR is below 2.0 180 tablet 3    dilTIAZem (CARDIZEM CD) 120 MG extended release capsule Take 1 capsule by mouth daily 90 capsule 3    flecainide (TAMBOCOR) 50 MG tablet Take 1 tablet by mouth 2 times daily 180 tablet 3    metoprolol tartrate (LOPRESSOR) 50 MG tablet Take 1 tablet by mouth 2 times daily 180 tablet 3    busPIRone (BUSPAR) 10 MG tablet Take 1 tablet by mouth 2 times daily 180 tablet 3    metFORMIN (GLUCOPHAGE) 500 MG tablet Take 1 tablet by mouth 2 times daily (with meals) 180 tablet 3    simvastatin (ZOCOR) 20 MG

## 2025-03-25 NOTE — PROGRESS NOTES
[facility] [place] [facility]  [address]  [place] [address]  Dept: [phone]     SUBJECTIVE     Luis Palmer is a 54 y.o.male    History of Present Illness  The patient presents for evaluation of diabetes, atrial fibrillation, and weight management.    The chief complaint includes diabetes management and weight loss. He has been actively engaged in weight loss efforts, with a current goal of losing an additional 20 pounds. Dietary modifications have been made, including the elimination of yogurt from his diet. He has been lifting weights at the gym and reports feeling well overall, both physically and emotionally, with improved sleep quality. Increased water intake is also noted.    A regimen of metformin, taking one tablet twice daily, is currently followed. Occasional hypoglycemic episodes occur, particularly during gym sessions or when there is a prolonged period between meals. Consuming sweets has been observed to elevate blood sugar levels, which subsequently decrease. No eye examination has been undergone this year.    Advair is administered twice in the morning and once at night, which is reported as effective. A refill of the albuterol inhaler is requested.    Buspirone is taken twice a day and Lexapro 20 mg once a day for mood management.    A consultation with Dr. Pineda regarding atrial fibrillation was conducted, during which continuation of the current medication regimen of metoprolol, diltiazem, flecainide, and Eliquis was advised. Encouragement to persist in weight loss efforts was also provided.    SOCIAL HISTORY  The patient quit smoking in February 2021.     Patient Active Problem List   Diagnosis    PSVT (paroxysmal supraventricular tachycardia)    Hypertension    GAETANO (obstructive sleep apnea)    Type 2 diabetes mellitus without complication, without long-term current use of insulin ([facility])    History of tobacco use disorder    Macrocytosis without anemia

## 2025-03-29 ENCOUNTER — HOSPITAL ENCOUNTER (OUTPATIENT)
Dept: CT IMAGING | Age: 55
Discharge: HOME | End: 2025-03-29
Payer: COMMERCIAL

## 2025-03-29 DIAGNOSIS — Z87.891 PERSONAL HISTORY OF TOBACCO USE: ICD-10-CM

## 2025-03-29 PROCEDURE — 71271 CT THORAX LUNG CANCER SCR C-: CPT

## 2025-03-31 ENCOUNTER — RESULTS FOLLOW-UP (OUTPATIENT)
Dept: FAMILY MEDICINE CLINIC | Age: 55
End: 2025-03-31

## 2025-04-01 NOTE — RESULT ENCOUNTER NOTE
Notify pt- Results reviewed in Dr. Leopold's absence. IMPRESSION: 1. ere is a 5 mm pulmonary nodule abutting the pleura posteromedially within the right upper lobe on axial image 59. This is consistent with a lung RADS category 2 pulmonary nodule on this baseline examination. Recommend 12-month follow-up low-dose chest CT to document stability. 2. There are no pathologically enlarged lymph nodes. 3. LUNGRADS 2, Patient should continue with annual LDCT for surveillance.  ES

## 2025-04-08 NOTE — TELEPHONE ENCOUNTER
Luis Palmer called requesting a refill on the following medications:  Requested Prescriptions     Pending Prescriptions Disp Refills    fluticasone-salmeterol (ADVAIR) 250-50 MCG/ACT AEPB diskus inhaler [Pharmacy Med Name: Fluticasone-Salmeterol 250-50 MCG/DOSE Inhalation Aerosol Powder Breath Activated] 60 each 0     Sig: INHALE 1 DOSE BY MOUTH TWICE DAILY       Date of last visit: 8/18/2023  Date of next visit (if applicable):9/26/2025  Date of last refill: 03/10/25  Pharmacy Name: [facility]

## 2025-04-09 RX ORDER — FLUTICASONE PROPIONATE AND SALMETEROL 250; 50 UG/1; UG/1
POWDER RESPIRATORY (INHALATION)
Qty: 60 EACH | Refills: 2 | Status: SHIPPED | OUTPATIENT
Start: 2025-04-09

## 2025-05-07 RX ORDER — VALSARTAN 80 MG/1
80 TABLET ORAL DAILY
Qty: 90 TABLET | Refills: 3 | Status: SHIPPED | OUTPATIENT
Start: 2025-05-07

## 2025-05-07 NOTE — TELEPHONE ENCOUNTER
Last visit- 8/18/2023  Next visit- Visit date not found    Requested Prescriptions     Pending Prescriptions Disp Refills    valsartan (DIOVAN) 80 MG tablet [Pharmacy Med Name: Valsartan 80 MG Oral Tablet] 90 tablet 0     Sig: Take 1 tablet by mouth once daily      Last visit: 3/25/25

## 2025-05-21 RX ORDER — DILTIAZEM HYDROCHLORIDE 120 MG/1
120 CAPSULE, COATED, EXTENDED RELEASE ORAL DAILY
Qty: 90 CAPSULE | Refills: 1 | Status: SHIPPED | OUTPATIENT
Start: 2025-05-21

## 2025-05-21 RX ORDER — FLECAINIDE ACETATE 50 MG/1
50 TABLET ORAL 2 TIMES DAILY
Qty: 180 TABLET | Refills: 1 | Status: SHIPPED | OUTPATIENT
Start: 2025-05-21

## 2025-06-04 RX ORDER — SIMVASTATIN 20 MG
20 TABLET ORAL DAILY
Qty: 90 TABLET | Refills: 3 | Status: SHIPPED | OUTPATIENT
Start: 2025-06-04

## 2025-06-04 RX ORDER — METOPROLOL TARTRATE 50 MG
50 TABLET ORAL 2 TIMES DAILY
Qty: 180 TABLET | Refills: 1 | Status: SHIPPED | OUTPATIENT
Start: 2025-06-04

## 2025-06-04 NOTE — TELEPHONE ENCOUNTER
Last visit- 3/25/2025  Next visit- 9/26/2025    Requested Prescriptions     Pending Prescriptions Disp Refills    simvastatin (ZOCOR) 20 MG tablet [Pharmacy Med Name: Simvastatin 20 MG Oral Tablet] 30 tablet 0     Sig: Take 1 tablet by mouth once daily

## 2025-06-06 RX ORDER — APIXABAN 5 MG/1
TABLET, FILM COATED ORAL
Qty: 180 TABLET | Refills: 1 | Status: SHIPPED | OUTPATIENT
Start: 2025-06-06

## 2025-06-30 RX ORDER — BUSPIRONE HYDROCHLORIDE 10 MG/1
10 TABLET ORAL 2 TIMES DAILY
Qty: 180 TABLET | Refills: 3 | Status: SHIPPED | OUTPATIENT
Start: 2025-06-30

## 2025-06-30 NOTE — TELEPHONE ENCOUNTER
Last visit- 3/25/2025  Next visit- 9/26/2025    Requested Prescriptions     Pending Prescriptions Disp Refills    busPIRone (BUSPAR) 10 MG tablet [Pharmacy Med Name: busPIRone HCl 10 MG Oral Tablet] 60 tablet 0     Sig: Take 1 tablet by mouth twice daily

## 2025-07-01 ENCOUNTER — OFFICE VISIT (OUTPATIENT)
Dept: FAMILY MEDICINE CLINIC | Age: 55
End: 2025-07-01
Payer: COMMERCIAL

## 2025-07-01 VITALS
SYSTOLIC BLOOD PRESSURE: 132 MMHG | TEMPERATURE: 100.1 F | DIASTOLIC BLOOD PRESSURE: 84 MMHG | WEIGHT: 315 LBS | OXYGEN SATURATION: 98 % | BODY MASS INDEX: 41.75 KG/M2 | HEIGHT: 73 IN | RESPIRATION RATE: 18 BRPM | HEART RATE: 72 BPM

## 2025-07-01 DIAGNOSIS — J98.8 CONGESTION OF UPPER AIRWAY: ICD-10-CM

## 2025-07-01 DIAGNOSIS — J02.9 SORE THROAT: ICD-10-CM

## 2025-07-01 DIAGNOSIS — J01.10 ACUTE NON-RECURRENT FRONTAL SINUSITIS: Primary | ICD-10-CM

## 2025-07-01 DIAGNOSIS — H93.8X1 EAR FULLNESS, RIGHT: ICD-10-CM

## 2025-07-01 LAB
Lab: NORMAL
QC PASS/FAIL: NORMAL
SARS-COV-2 RDRP RESP QL NAA+PROBE: NEGATIVE
STREPTOCOCCUS A RNA: NEGATIVE

## 2025-07-01 PROCEDURE — 99213 OFFICE O/P EST LOW 20 MIN: CPT

## 2025-07-01 PROCEDURE — 87651 STREP A DNA AMP PROBE: CPT

## 2025-07-01 PROCEDURE — 87635 SARS-COV-2 COVID-19 AMP PRB: CPT

## 2025-07-01 PROCEDURE — 3079F DIAST BP 80-89 MM HG: CPT

## 2025-07-01 PROCEDURE — 3075F SYST BP GE 130 - 139MM HG: CPT

## 2025-07-01 ASSESSMENT — ENCOUNTER SYMPTOMS
WHEEZING: 0
DIARRHEA: 0
SORE THROAT: 1
CONSTIPATION: 0
NAUSEA: 0
COUGH: 1
RHINORRHEA: 1
ABDOMINAL PAIN: 0
SINUS PAIN: 1
SINUS PRESSURE: 1
SHORTNESS OF BREATH: 0

## 2025-07-01 NOTE — PROGRESS NOTES
last occurrence. These symptoms have been present for approximately 3 to 4 days. He notes that his girlfriend recently had similar symptoms, which she has since recovered from, and suspects he may have contracted the same illness. He experiences mild allergies, which are less severe than in the past. He was previously taking Allegra for these allergies but discontinued its use as it did not seem to alleviate his current symptoms. He has not taken any over-the-counter medications for his current symptoms. He avoids Benadryl as it causes him to feel strange.    Review of Systems   Constitutional:  Negative for activity change, appetite change, chills, fatigue and fever.   HENT:  Positive for congestion, ear pain, rhinorrhea, sinus pressure, sinus pain and sore throat.    Respiratory:  Positive for cough. Negative for shortness of breath and wheezing.    Cardiovascular:  Negative for chest pain and palpitations.   Gastrointestinal:  Negative for abdominal pain, constipation, diarrhea and nausea.   Genitourinary:  Negative for dysuria and hematuria.   Musculoskeletal:  Negative for arthralgias and myalgias.   Neurological:  Negative for dizziness and headaches.   Psychiatric/Behavioral:  Negative for sleep disturbance. The patient is not nervous/anxious.           Objective   Blood pressure 132/84, pulse 72, temperature 100.1 °F (37.8 °C), resp. rate 18, height 1.854 m (6' 0.99\"), weight (!) 154.2 kg (340 lb), SpO2 98%.  Physical Exam       Physical Exam  Vitals and nursing note reviewed.   Constitutional:       General: He is not in acute distress.     Appearance: Normal appearance. He is well-developed.   HENT:      Head: Normocephalic and atraumatic.      Right Ear: Hearing, ear canal and external ear normal. A middle ear effusion is present.      Left Ear: Hearing, ear canal and external ear normal. A middle ear effusion is present.      Nose: Congestion present. No rhinorrhea.      Right Sinus: Maxillary sinus

## 2025-07-07 RX ORDER — FLUTICASONE PROPIONATE AND SALMETEROL 250; 50 UG/1; UG/1
POWDER RESPIRATORY (INHALATION)
Qty: 60 EACH | Refills: 0 | OUTPATIENT
Start: 2025-07-07

## 2025-07-07 RX ORDER — FLUTICASONE PROPIONATE AND SALMETEROL 250; 50 UG/1; UG/1
POWDER RESPIRATORY (INHALATION)
Qty: 60 EACH | Refills: 5 | Status: SHIPPED | OUTPATIENT
Start: 2025-07-07

## 2025-07-07 NOTE — TELEPHONE ENCOUNTER
Last visit- 8/18/2023  Next visit- 7/6/2025    Requested Prescriptions     Pending Prescriptions Disp Refills    fluticasone-salmeterol (ADVAIR) 250-50 MCG/ACT AEPB diskus inhaler [Pharmacy Med Name: Fluticasone-Salmeterol 250-50 MCG/DOSE Inhalation Aerosol Powder Breath Activated] 60 each 0     Sig: INHALE 1 DOSE BY MOUTH TWICE DAILY

## 2025-08-26 ENCOUNTER — OFFICE VISIT (OUTPATIENT)
Dept: FAMILY MEDICINE CLINIC | Age: 55
End: 2025-08-26
Payer: COMMERCIAL

## 2025-08-26 VITALS
WEIGHT: 306.5 LBS | DIASTOLIC BLOOD PRESSURE: 68 MMHG | HEART RATE: 64 BPM | BODY MASS INDEX: 40.62 KG/M2 | RESPIRATION RATE: 16 BRPM | OXYGEN SATURATION: 98 % | HEIGHT: 73 IN | SYSTOLIC BLOOD PRESSURE: 136 MMHG

## 2025-08-26 DIAGNOSIS — F41.1 GENERALIZED ANXIETY DISORDER: Primary | ICD-10-CM

## 2025-08-26 PROCEDURE — 99213 OFFICE O/P EST LOW 20 MIN: CPT

## 2025-08-26 PROCEDURE — 3075F SYST BP GE 130 - 139MM HG: CPT

## 2025-08-26 PROCEDURE — 3078F DIAST BP <80 MM HG: CPT

## 2025-08-26 RX ORDER — BUSPIRONE HYDROCHLORIDE 10 MG/1
10 TABLET ORAL 3 TIMES DAILY
Qty: 180 TABLET | Refills: 3
Start: 2025-08-26

## 2025-08-26 ASSESSMENT — ENCOUNTER SYMPTOMS
WHEEZING: 0
ABDOMINAL PAIN: 0
DIARRHEA: 0
RHINORRHEA: 0
SHORTNESS OF BREATH: 0
SORE THROAT: 0
COUGH: 0
CONSTIPATION: 0
NAUSEA: 0